# Patient Record
Sex: FEMALE | Race: WHITE | NOT HISPANIC OR LATINO | Employment: FULL TIME | URBAN - NONMETROPOLITAN AREA
[De-identification: names, ages, dates, MRNs, and addresses within clinical notes are randomized per-mention and may not be internally consistent; named-entity substitution may affect disease eponyms.]

---

## 2017-05-19 RX ORDER — LEVOTHYROXINE SODIUM 0.15 MG/1
150 TABLET ORAL DAILY
COMMUNITY
End: 2017-05-23 | Stop reason: SDUPTHER

## 2017-05-22 ENCOUNTER — OFFICE VISIT (OUTPATIENT)
Dept: FAMILY MEDICINE CLINIC | Facility: CLINIC | Age: 52
End: 2017-05-22

## 2017-05-22 VITALS
WEIGHT: 234.2 LBS | HEIGHT: 64 IN | TEMPERATURE: 98.3 F | DIASTOLIC BLOOD PRESSURE: 83 MMHG | SYSTOLIC BLOOD PRESSURE: 123 MMHG | RESPIRATION RATE: 16 BRPM | OXYGEN SATURATION: 95 % | BODY MASS INDEX: 39.98 KG/M2 | HEART RATE: 75 BPM

## 2017-05-22 DIAGNOSIS — K21.9 GASTROESOPHAGEAL REFLUX DISEASE WITHOUT ESOPHAGITIS: ICD-10-CM

## 2017-05-22 DIAGNOSIS — E03.8 OTHER SPECIFIED HYPOTHYROIDISM: Primary | ICD-10-CM

## 2017-05-22 PROBLEM — M77.12 LATERAL EPICONDYLITIS OF BOTH ELBOWS: Status: ACTIVE | Noted: 2017-05-22

## 2017-05-22 PROBLEM — G56.03 BILATERAL CARPAL TUNNEL SYNDROME: Status: ACTIVE | Noted: 2017-05-22

## 2017-05-22 PROBLEM — G47.419 PRIMARY NARCOLEPSY WITHOUT CATAPLEXY: Status: ACTIVE | Noted: 2017-05-22

## 2017-05-22 PROBLEM — M19.90 ARTHRITIS: Status: ACTIVE | Noted: 2017-05-22

## 2017-05-22 PROBLEM — M77.11 LATERAL EPICONDYLITIS OF BOTH ELBOWS: Status: ACTIVE | Noted: 2017-05-22

## 2017-05-22 PROCEDURE — 99204 OFFICE O/P NEW MOD 45 MIN: CPT | Performed by: NURSE PRACTITIONER

## 2017-05-22 RX ORDER — OMEPRAZOLE 20 MG/1
20 CAPSULE, DELAYED RELEASE ORAL DAILY
Qty: 90 CAPSULE | Refills: 1 | Status: SHIPPED | OUTPATIENT
Start: 2017-05-22 | End: 2017-11-22 | Stop reason: SDUPTHER

## 2017-05-23 DIAGNOSIS — E03.9 HYPOTHYROIDISM, UNSPECIFIED TYPE: Primary | ICD-10-CM

## 2017-05-23 LAB — TSH SERPL DL<=0.005 MIU/L-ACNC: 4.05 MIU/ML (ref 0.47–4.68)

## 2017-05-23 RX ORDER — LEVOTHYROXINE SODIUM 0.15 MG/1
150 TABLET ORAL DAILY
Qty: 90 TABLET | Refills: 1 | Status: SHIPPED | OUTPATIENT
Start: 2017-05-23 | End: 2017-11-27 | Stop reason: SDUPTHER

## 2017-11-22 ENCOUNTER — OFFICE VISIT (OUTPATIENT)
Dept: FAMILY MEDICINE CLINIC | Facility: CLINIC | Age: 52
End: 2017-11-22

## 2017-11-22 VITALS
BODY MASS INDEX: 40.67 KG/M2 | HEART RATE: 68 BPM | OXYGEN SATURATION: 97 % | HEIGHT: 64 IN | SYSTOLIC BLOOD PRESSURE: 126 MMHG | RESPIRATION RATE: 18 BRPM | WEIGHT: 238.2 LBS | TEMPERATURE: 97.8 F | DIASTOLIC BLOOD PRESSURE: 74 MMHG

## 2017-11-22 DIAGNOSIS — E55.9 VITAMIN D DEFICIENCY: ICD-10-CM

## 2017-11-22 DIAGNOSIS — Z12.4 SCREENING FOR CERVICAL CANCER: ICD-10-CM

## 2017-11-22 DIAGNOSIS — R53.83 FATIGUE, UNSPECIFIED TYPE: ICD-10-CM

## 2017-11-22 DIAGNOSIS — E03.9 HYPOTHYROIDISM, UNSPECIFIED TYPE: ICD-10-CM

## 2017-11-22 DIAGNOSIS — Z00.01 ENCOUNTER FOR WELL ADULT EXAM WITH ABNORMAL FINDINGS: ICD-10-CM

## 2017-11-22 DIAGNOSIS — Z12.11 SCREENING FOR MALIGNANT NEOPLASM OF COLON: ICD-10-CM

## 2017-11-22 DIAGNOSIS — Z12.39 SCREENING FOR MALIGNANT NEOPLASM OF BREAST: Primary | ICD-10-CM

## 2017-11-22 DIAGNOSIS — K21.9 GASTROESOPHAGEAL REFLUX DISEASE WITHOUT ESOPHAGITIS: ICD-10-CM

## 2017-11-22 DIAGNOSIS — Z13.220 SCREENING FOR LIPID DISORDERS: ICD-10-CM

## 2017-11-22 PROCEDURE — 99396 PREV VISIT EST AGE 40-64: CPT | Performed by: NURSE PRACTITIONER

## 2017-11-22 RX ORDER — OMEPRAZOLE 20 MG/1
20 CAPSULE, DELAYED RELEASE ORAL DAILY
Qty: 90 CAPSULE | Refills: 1 | Status: SHIPPED | OUTPATIENT
Start: 2017-11-22 | End: 2018-11-21 | Stop reason: SDUPTHER

## 2017-11-23 LAB
25(OH)D3+25(OH)D2 SERPL-MCNC: 23.1 NG/ML (ref 30–100)
ALBUMIN SERPL-MCNC: 4.3 G/DL (ref 3.5–5)
ALBUMIN/GLOB SERPL: 1.4 G/DL (ref 1.1–2.5)
ALP SERPL-CCNC: 82 U/L (ref 24–120)
ALT SERPL-CCNC: 40 U/L (ref 0–54)
AST SERPL-CCNC: 24 U/L (ref 7–45)
BASOPHILS # BLD AUTO: 0.03 10*3/MM3 (ref 0–0.2)
BASOPHILS NFR BLD AUTO: 0.3 % (ref 0–2)
BILIRUB SERPL-MCNC: 0.5 MG/DL (ref 0.1–1)
BUN SERPL-MCNC: 15 MG/DL (ref 5–21)
BUN/CREAT SERPL: 22.1 (ref 7–25)
CALCIUM SERPL-MCNC: 9.4 MG/DL (ref 8.4–10.4)
CHLORIDE SERPL-SCNC: 102 MMOL/L (ref 98–110)
CHOLEST SERPL-MCNC: 180 MG/DL (ref 130–200)
CO2 SERPL-SCNC: 31 MMOL/L (ref 24–31)
CREAT SERPL-MCNC: 0.68 MG/DL (ref 0.5–1.4)
EOSINOPHIL # BLD AUTO: 0.25 10*3/MM3 (ref 0–0.7)
EOSINOPHIL NFR BLD AUTO: 2.9 % (ref 0–4)
ERYTHROCYTE [DISTWIDTH] IN BLOOD BY AUTOMATED COUNT: 14.4 % (ref 12–15)
GFR SERPLBLD CREATININE-BSD FMLA CKD-EPI: 107 ML/MIN/1.73
GFR SERPLBLD CREATININE-BSD FMLA CKD-EPI: 88 ML/MIN/1.73
GLOBULIN SER CALC-MCNC: 3 GM/DL
GLUCOSE SERPL-MCNC: 112 MG/DL (ref 70–100)
HCT VFR BLD AUTO: 40.8 % (ref 37–47)
HDLC SERPL-MCNC: 79 MG/DL
HGB BLD-MCNC: 13 G/DL (ref 12–16)
IMM GRANULOCYTES # BLD: 0.04 10*3/MM3 (ref 0–0.03)
IMM GRANULOCYTES NFR BLD: 0.5 % (ref 0–5)
LDLC SERPL CALC-MCNC: 87 MG/DL (ref 0–99)
LYMPHOCYTES # BLD AUTO: 2.27 10*3/MM3 (ref 0.72–4.86)
LYMPHOCYTES NFR BLD AUTO: 26.2 % (ref 15–45)
MCH RBC QN AUTO: 30 PG (ref 28–32)
MCHC RBC AUTO-ENTMCNC: 31.9 G/DL (ref 33–36)
MCV RBC AUTO: 94 FL (ref 82–98)
MONOCYTES # BLD AUTO: 0.57 10*3/MM3 (ref 0.19–1.3)
MONOCYTES NFR BLD AUTO: 6.6 % (ref 4–12)
NEUTROPHILS # BLD AUTO: 5.52 10*3/MM3 (ref 1.87–8.4)
NEUTROPHILS NFR BLD AUTO: 63.5 % (ref 39–78)
PLATELET # BLD AUTO: 200 10*3/MM3 (ref 130–400)
POTASSIUM SERPL-SCNC: 4.3 MMOL/L (ref 3.5–5.3)
PROT SERPL-MCNC: 7.3 G/DL (ref 6.3–8.7)
RBC # BLD AUTO: 4.34 10*6/MM3 (ref 4.2–5.4)
SODIUM SERPL-SCNC: 142 MMOL/L (ref 135–145)
TRIGL SERPL-MCNC: 68 MG/DL (ref 0–149)
TSH SERPL DL<=0.005 MIU/L-ACNC: 2.9 MIU/ML (ref 0.47–4.68)
VLDLC SERPL CALC-MCNC: 13.6 MG/DL
WBC # BLD AUTO: 8.68 10*3/MM3 (ref 4.8–10.8)

## 2017-11-27 ENCOUNTER — HOSPITAL ENCOUNTER (OUTPATIENT)
Dept: MAMMOGRAPHY | Facility: HOSPITAL | Age: 52
Discharge: HOME | End: 2017-11-27
Admitting: NURSE PRACTITIONER

## 2017-11-27 DIAGNOSIS — Z12.39 SCREENING FOR MALIGNANT NEOPLASM OF BREAST: ICD-10-CM

## 2017-11-27 DIAGNOSIS — E55.9 VITAMIN D DEFICIENCY: Primary | ICD-10-CM

## 2017-11-27 DIAGNOSIS — E03.9 HYPOTHYROIDISM, UNSPECIFIED TYPE: ICD-10-CM

## 2017-11-27 PROCEDURE — G0202 SCR MAMMO BI INCL CAD: HCPCS

## 2017-11-27 PROCEDURE — 77063 BREAST TOMOSYNTHESIS BI: CPT

## 2017-11-27 RX ORDER — LEVOTHYROXINE SODIUM 0.15 MG/1
150 TABLET ORAL DAILY
Qty: 90 TABLET | Refills: 1 | Status: SHIPPED | OUTPATIENT
Start: 2017-11-27 | End: 2018-02-19

## 2017-12-20 ENCOUNTER — OFFICE VISIT (OUTPATIENT)
Dept: GASTROENTEROLOGY | Facility: CLINIC | Age: 52
End: 2017-12-20

## 2017-12-20 VITALS
OXYGEN SATURATION: 99 % | TEMPERATURE: 96.3 F | WEIGHT: 238 LBS | BODY MASS INDEX: 40.63 KG/M2 | HEIGHT: 64 IN | HEART RATE: 65 BPM | SYSTOLIC BLOOD PRESSURE: 128 MMHG | DIASTOLIC BLOOD PRESSURE: 82 MMHG

## 2017-12-20 DIAGNOSIS — K63.5 POLYP OF COLON, UNSPECIFIED PART OF COLON, UNSPECIFIED TYPE: Primary | ICD-10-CM

## 2017-12-20 DIAGNOSIS — Z83.71 FAMILY HISTORY OF POLYPS IN THE COLON: ICD-10-CM

## 2017-12-20 PROBLEM — Z83.719 FAMILY HISTORY OF POLYPS IN THE COLON: Status: ACTIVE | Noted: 2017-12-20

## 2017-12-20 PROCEDURE — S0285 CNSLT BEFORE SCREEN COLONOSC: HCPCS | Performed by: NURSE PRACTITIONER

## 2017-12-20 RX ORDER — SODIUM, POTASSIUM,MAG SULFATES 17.5-3.13G
2 SOLUTION, RECONSTITUTED, ORAL ORAL ONCE
Qty: 2 BOTTLE | Refills: 0 | Status: SHIPPED | OUTPATIENT
Start: 2017-12-20 | End: 2017-12-20

## 2017-12-20 NOTE — PROGRESS NOTES
"Chief Complaint   Patient presents with   • Colon Cancer Screening     Patient is here today as a new patient for colon screening.     Subjective   HPI  Jordy Mir is a 52 y.o. female who presents as a referral for preventative maintenance. She has no complaints of nausea or vomiting. No change in bowels. No wt loss. No BRBPR. No melena. There is no family hx for colon cancer. No abdominal pain. The patients last colonoscopy was performed in [place] with findings of a \"thumb nail size polyp\" per patient that was removed . She states that she was supposed to have a repeat colonoscopy in 2 years but she moved and did not get this done.  Past Medical History:   Diagnosis Date   • Disease of thyroid gland    • GERD (gastroesophageal reflux disease)    • Narcolepsy      Past Surgical History:   Procedure Laterality Date   •  SECTION     • CHOLECYSTECTOMY     • COLONOSCOPY     • HYSTERECTOMY     • OOPHORECTOMY         Current Outpatient Prescriptions:   •  levothyroxine (SYNTHROID, LEVOTHROID) 150 MCG tablet, Take 1 tablet by mouth Daily., Disp: 90 tablet, Rfl: 1  •  omeprazole (priLOSEC) 20 MG capsule, Take 1 capsule by mouth Daily., Disp: 90 capsule, Rfl: 1  •  sodium-potassium-magnesium sulfates (SUPREP BOWEL PREP KIT) 17.5-3.13-1.6 GM/180ML solution oral solution, Take 2 bottles by mouth 1 (One) Time for 1 dose. Split dose prep as directed by office instructions provided.  2 bottles = one kit., Disp: 2 bottle, Rfl: 0  Allergies   Allergen Reactions   • Contrast Dye    • Sulfa Antibiotics Rash     Social History     Social History   • Marital status:      Spouse name: N/A   • Number of children: N/A   • Years of education: N/A     Occupational History   • Not on file.     Social History Main Topics   • Smoking status: Former Smoker   • Smokeless tobacco: Never Used      Comment: Quit in    • Alcohol use No   • Drug use: No   • Sexual activity: Defer     Other Topics Concern   • Not on " "file     Social History Narrative     Family History   Problem Relation Age of Onset   • Arthritis Mother    • Colon polyps Mother    • Arthritis Father    • No Known Problems Sister    • Narcolepsy Daughter    • Depression Maternal Grandmother    • Depression Paternal Grandfather        REVIEW OF SYSTEMS  General: well appearing, no fever chills or sweats, no unexplained wt loss  HEENT: no acute visual or hearing disturbances  Cardiovascular: No chest pain or palpitations  Pulmonary: No shortness of breath, coughing, wheezing or hemoptysis  : No burning, urgency, hematuria, or dysuria  Musculoskeletal: No joint pain or stiffness  Peripheral: no edema  Skin: No lesions or rashes  Neuro: No dizziness, headaches, stroke, syncope  Endocrine: No hot or cold intolerances  Hematological: No blood dyscrasias    Objective   Vitals:    12/20/17 0923   BP: 128/82   Pulse: 65   Temp: 96.3 °F (35.7 °C)   SpO2: 99%   Weight: 108 kg (238 lb)   Height: 162.6 cm (64\")     Body mass index is 40.85 kg/(m^2).    PHYSICAL EXAM  General: age appropriate well nourished well appearing, no acute distress  Head: normocephalic and atraumatic  Global assessment-supple  Neck-No JVD noted, no lymphadenopathy  Pulmonary-clear to auscultation bilaterally, normal respiratory effort  Cardiovascular-normal rate and rhythm, normal heart sounds, S1 and S2 noted  Abdomen-soft, non tender, non distended, normal bowel sounds all 4 quadrants, no hepatosplenomegaly noted  Extremities-No clubbing cyanosis or edema  Neuro-Non focal, converses appropriately, awake, alert, oriented    Imaging Results (most recent)     None        Assessment/Plan   Jordy was seen today for colon cancer screening.    Diagnoses and all orders for this visit:    Polyp of colon, unspecified part of colon, unspecified type  -     Case Request; Standing  -     Case Request    Family history of polyps in the colon  Comments:  mother  Orders:  -     Case Request; Standing  -     Case " Request    Other orders  -     Implement Anesthesia Orders Day of Procedure; Standing  -     Obtain Informed Consent; Standing  -     Verify bowel prep was successful; Standing  -     sodium-potassium-magnesium sulfates (SUPREP BOWEL PREP KIT) 17.5-3.13-1.6 GM/180ML solution oral solution; Take 2 bottles by mouth 1 (One) Time for 1 dose. Split dose prep as directed by office instructions provided.  2 bottles = one kit.    COLONOSCOPY WITH ANESTHESIA (N/A)      All risks, benefits, alternatives, and indications of colonoscopy procedure have been discussed with the patient. Risks to include perforation of the colon requiring possible surgery or colostomy, risk of bleeding from biopsies or removal of colon tissue, possibility of missing a colon polyp or cancer, or adverse drug reaction.  Benefits to include the diagnosis and management of disease of the colon and rectum. Alternatives to include barium enema, radiographic evaluation, lab testing or no intervention. Pt verbalizes understanding and agrees.

## 2017-12-22 ENCOUNTER — OFFICE VISIT (OUTPATIENT)
Dept: OBSTETRICS AND GYNECOLOGY | Facility: CLINIC | Age: 52
End: 2017-12-22

## 2017-12-22 VITALS
SYSTOLIC BLOOD PRESSURE: 152 MMHG | WEIGHT: 235 LBS | DIASTOLIC BLOOD PRESSURE: 94 MMHG | HEIGHT: 64 IN | BODY MASS INDEX: 40.12 KG/M2

## 2017-12-22 DIAGNOSIS — N81.11 CYSTOCELE, MIDLINE: ICD-10-CM

## 2017-12-22 DIAGNOSIS — Z01.411 ENCOUNTER FOR GYNECOLOGICAL EXAMINATION WITH ABNORMAL FINDING: Primary | ICD-10-CM

## 2017-12-22 PROCEDURE — 99386 PREV VISIT NEW AGE 40-64: CPT | Performed by: OBSTETRICS & GYNECOLOGY

## 2017-12-22 NOTE — PROGRESS NOTES
"Subjective   Jordy Mir is a 52 y.o. female.     CC: annual exam    History of Present Illness  Jordy Mir is a 52 y.o. female here today for annual GYN examination. She is surgically menopausal and has had a hysterectomy for bleeding. She has no history of abnormal Pap smears. She denies any vaginal discharge or bleeding. She is not on hormone replacement therapy.  Her last mammogram was 11/27/17 and read as BIRADS 1. She has no specific complaints today and denies abdominal or pelvic pain.     Social History   Substance Use Topics   • Smoking status: Former Smoker   • Smokeless tobacco: Never Used      Comment: Quit in 2004   • Alcohol use No        Review of Systems   Constitutional: Negative for unexpected weight change.   Respiratory: Negative for shortness of breath.    Cardiovascular: Negative for chest pain.   Gastrointestinal: Negative for abdominal pain.   Genitourinary: Negative for vaginal bleeding.       Visit Vitals   • /94 (BP Location: Left arm, Patient Position: Sitting)   • Ht 162.6 cm (64\")   • Wt 107 kg (235 lb)   • BMI 40.34 kg/m2      Objective   Physical Exam   Constitutional: She is oriented to person, place, and time. She appears well-developed and well-nourished. No distress.   HENT:   Head: Normocephalic and atraumatic.   Eyes: EOM are normal.   Neck: Normal range of motion. Neck supple. No thyromegaly present.   Cardiovascular: Normal rate and regular rhythm.    No murmur heard.  Pulmonary/Chest: Effort normal and breath sounds normal. Right breast exhibits no inverted nipple and no mass. Left breast exhibits no inverted nipple and no mass.   Abdominal: Soft. She exhibits no distension. There is no tenderness.   Genitourinary: Vagina normal. No breast tenderness or discharge. Pelvic exam was performed with patient supine. There is no tenderness or lesion on the right labia. There is no tenderness or lesion on the left labia. Right adnexum displays no tenderness and no " fullness. Left adnexum displays no tenderness and no fullness. No bleeding in the vagina. No vaginal discharge found.   Genitourinary Comments: Pt s/p hysterectomy: uterus and cervix surgically absent.  Vaginal cuff unremarkable. There is a grade 2-3 cystocele noted.   Musculoskeletal: Normal range of motion. She exhibits no edema.   Neurological: She is alert and oriented to person, place, and time.   Skin: Skin is warm and dry.   Psychiatric: She has a normal mood and affect. Her behavior is normal. Judgment normal.   Nursing note and vitals reviewed.      Assessment/Plan   Jordy was seen today for gynecologic exam.    Diagnoses and all orders for this visit:    Encounter for gynecological examination with abnormal finding    Cystocele, midline       We have discussed current Pap smear and mammogram guidelines. We discussed her cystocele, but she is asymptomatic.  She will followup in one year or sooner if needed.

## 2018-02-05 ENCOUNTER — RESULTS ENCOUNTER (OUTPATIENT)
Dept: FAMILY MEDICINE CLINIC | Facility: CLINIC | Age: 53
End: 2018-02-05

## 2018-02-05 DIAGNOSIS — E55.9 VITAMIN D DEFICIENCY: ICD-10-CM

## 2018-02-17 LAB
25(OH)D3+25(OH)D2 SERPL-MCNC: <12.8 NG/ML (ref 30–100)
ALBUMIN SERPL-MCNC: 4.3 G/DL (ref 3.5–5)
ALBUMIN/GLOB SERPL: 1.3 G/DL (ref 1.1–2.5)
ALP SERPL-CCNC: 81 U/L (ref 24–120)
ALT SERPL-CCNC: 34 U/L (ref 0–54)
AST SERPL-CCNC: 21 U/L (ref 7–45)
BASOPHILS # BLD AUTO: 0.05 10*3/MM3 (ref 0–0.2)
BASOPHILS NFR BLD AUTO: 0.5 % (ref 0–2)
BILIRUB SERPL-MCNC: 0.5 MG/DL (ref 0.1–1)
BUN SERPL-MCNC: 14 MG/DL (ref 5–21)
BUN/CREAT SERPL: 20.6 (ref 7–25)
CALCIUM SERPL-MCNC: 9.7 MG/DL (ref 8.4–10.4)
CHLORIDE SERPL-SCNC: 99 MMOL/L (ref 98–110)
CHOLEST SERPL-MCNC: 180 MG/DL (ref 130–200)
CO2 SERPL-SCNC: 31 MMOL/L (ref 24–31)
CREAT SERPL-MCNC: 0.68 MG/DL (ref 0.5–1.4)
EOSINOPHIL # BLD AUTO: 0.31 10*3/MM3 (ref 0–0.7)
EOSINOPHIL NFR BLD AUTO: 3.4 % (ref 0–4)
ERYTHROCYTE [DISTWIDTH] IN BLOOD BY AUTOMATED COUNT: 14 % (ref 12–15)
GFR SERPLBLD CREATININE-BSD FMLA CKD-EPI: 111 ML/MIN/1.73
GFR SERPLBLD CREATININE-BSD FMLA CKD-EPI: 92 ML/MIN/1.73
GLOBULIN SER CALC-MCNC: 3.2 GM/DL
GLUCOSE SERPL-MCNC: 116 MG/DL (ref 70–100)
HCT VFR BLD AUTO: 42.2 % (ref 37–47)
HDLC SERPL-MCNC: 70 MG/DL
HGB BLD-MCNC: 13.2 G/DL (ref 12–16)
IMM GRANULOCYTES # BLD: 0.04 10*3/MM3 (ref 0–0.03)
IMM GRANULOCYTES NFR BLD: 0.4 % (ref 0–5)
LDLC SERPL CALC-MCNC: 92 MG/DL (ref 0–99)
LYMPHOCYTES # BLD AUTO: 2.17 10*3/MM3 (ref 0.72–4.86)
LYMPHOCYTES NFR BLD AUTO: 23.7 % (ref 15–45)
MCH RBC QN AUTO: 29.5 PG (ref 28–32)
MCHC RBC AUTO-ENTMCNC: 31.3 G/DL (ref 33–36)
MCV RBC AUTO: 94.2 FL (ref 82–98)
MONOCYTES # BLD AUTO: 0.58 10*3/MM3 (ref 0.19–1.3)
MONOCYTES NFR BLD AUTO: 6.3 % (ref 4–12)
NEUTROPHILS # BLD AUTO: 6 10*3/MM3 (ref 1.87–8.4)
NEUTROPHILS NFR BLD AUTO: 65.7 % (ref 39–78)
NRBC BLD AUTO-RTO: 0 /100 WBC (ref 0–0)
PLATELET # BLD AUTO: 209 10*3/MM3 (ref 130–400)
POTASSIUM SERPL-SCNC: 4.5 MMOL/L (ref 3.5–5.3)
PROT SERPL-MCNC: 7.5 G/DL (ref 6.3–8.7)
RBC # BLD AUTO: 4.48 10*6/MM3 (ref 4.2–5.4)
SODIUM SERPL-SCNC: 139 MMOL/L (ref 135–145)
TRIGL SERPL-MCNC: 89 MG/DL (ref 0–149)
TSH SERPL DL<=0.005 MIU/L-ACNC: 7 MIU/ML (ref 0.47–4.68)
VLDLC SERPL CALC-MCNC: 17.8 MG/DL
WBC # BLD AUTO: 9.15 10*3/MM3 (ref 4.8–10.8)

## 2018-02-19 DIAGNOSIS — E55.9 VITAMIN D DEFICIENCY: Primary | ICD-10-CM

## 2018-02-19 DIAGNOSIS — E03.9 ACQUIRED HYPOTHYROIDISM: ICD-10-CM

## 2018-02-19 DIAGNOSIS — E03.9 HYPOTHYROIDISM, UNSPECIFIED TYPE: ICD-10-CM

## 2018-02-19 RX ORDER — LEVOTHYROXINE SODIUM 175 UG/1
175 TABLET ORAL DAILY
Qty: 90 TABLET | Refills: 0 | Status: SHIPPED | OUTPATIENT
Start: 2018-02-19 | End: 2018-05-22 | Stop reason: SDUPTHER

## 2018-02-19 RX ORDER — ERGOCALCIFEROL 1.25 MG/1
50000 CAPSULE ORAL WEEKLY
Qty: 12 CAPSULE | Refills: 1 | Status: SHIPPED | OUTPATIENT
Start: 2018-02-19 | End: 2018-05-22 | Stop reason: SDUPTHER

## 2018-02-24 DIAGNOSIS — E55.9 VITAMIN D DEFICIENCY: Primary | ICD-10-CM

## 2018-02-24 LAB — 25(OH)D3+25(OH)D2 SERPL-MCNC: 18.7 NG/ML (ref 30–100)

## 2018-04-12 ENCOUNTER — ANESTHESIA (OUTPATIENT)
Dept: GASTROENTEROLOGY | Facility: HOSPITAL | Age: 53
End: 2018-04-12

## 2018-04-12 ENCOUNTER — HOSPITAL ENCOUNTER (OUTPATIENT)
Facility: HOSPITAL | Age: 53
Setting detail: HOSPITAL OUTPATIENT SURGERY
Discharge: HOME | End: 2018-04-12
Attending: INTERNAL MEDICINE | Admitting: INTERNAL MEDICINE

## 2018-04-12 ENCOUNTER — ANESTHESIA EVENT (OUTPATIENT)
Dept: GASTROENTEROLOGY | Facility: HOSPITAL | Age: 53
End: 2018-04-12

## 2018-04-12 VITALS
WEIGHT: 239 LBS | OXYGEN SATURATION: 100 % | HEART RATE: 58 BPM | HEIGHT: 64 IN | DIASTOLIC BLOOD PRESSURE: 70 MMHG | SYSTOLIC BLOOD PRESSURE: 134 MMHG | RESPIRATION RATE: 20 BRPM | BODY MASS INDEX: 40.8 KG/M2 | TEMPERATURE: 96.9 F

## 2018-04-12 DIAGNOSIS — K63.5 POLYP OF COLON, UNSPECIFIED PART OF COLON, UNSPECIFIED TYPE: ICD-10-CM

## 2018-04-12 DIAGNOSIS — Z83.71 FAMILY HISTORY OF POLYPS IN THE COLON: ICD-10-CM

## 2018-04-12 PROCEDURE — 45385 COLONOSCOPY W/LESION REMOVAL: CPT | Performed by: INTERNAL MEDICINE

## 2018-04-12 PROCEDURE — 88305 TISSUE EXAM BY PATHOLOGIST: CPT | Performed by: INTERNAL MEDICINE

## 2018-04-12 PROCEDURE — 25010000002 PROPOFOL 10 MG/ML EMULSION: Performed by: NURSE ANESTHETIST, CERTIFIED REGISTERED

## 2018-04-12 RX ORDER — SODIUM CHLORIDE 9 MG/ML
500 INJECTION, SOLUTION INTRAVENOUS CONTINUOUS PRN
Status: DISCONTINUED | OUTPATIENT
Start: 2018-04-12 | End: 2018-04-12 | Stop reason: HOSPADM

## 2018-04-12 RX ORDER — PROPOFOL 10 MG/ML
VIAL (ML) INTRAVENOUS AS NEEDED
Status: DISCONTINUED | OUTPATIENT
Start: 2018-04-12 | End: 2018-04-12 | Stop reason: SURG

## 2018-04-12 RX ORDER — SODIUM CHLORIDE 0.9 % (FLUSH) 0.9 %
3 SYRINGE (ML) INJECTION AS NEEDED
Status: DISCONTINUED | OUTPATIENT
Start: 2018-04-12 | End: 2018-04-12 | Stop reason: HOSPADM

## 2018-04-12 RX ORDER — LIDOCAINE HYDROCHLORIDE 20 MG/ML
INJECTION, SOLUTION INFILTRATION; PERINEURAL AS NEEDED
Status: DISCONTINUED | OUTPATIENT
Start: 2018-04-12 | End: 2018-04-12 | Stop reason: SURG

## 2018-04-12 RX ADMIN — PROPOFOL 260 MG: 10 INJECTION, EMULSION INTRAVENOUS at 12:18

## 2018-04-12 RX ADMIN — SODIUM CHLORIDE 500 ML: 9 INJECTION, SOLUTION INTRAVENOUS at 11:19

## 2018-04-12 RX ADMIN — LIDOCAINE HYDROCHLORIDE 0.5 ML: 10 INJECTION, SOLUTION EPIDURAL; INFILTRATION; INTRACAUDAL; PERINEURAL at 11:19

## 2018-04-12 RX ADMIN — LIDOCAINE HYDROCHLORIDE 60 MG: 20 INJECTION, SOLUTION INFILTRATION; PERINEURAL at 12:18

## 2018-04-12 NOTE — H&P
"    Chief Complaint:   Colon polyps    Subjective     HPI:   She had a colon polyp removed  and was constant.  She is advised to two-year follow-up that has yet to have it done.  She presents now for this to occur.    Past Medical History:   Past Medical History:   Diagnosis Date   • Disease of thyroid gland    • GERD (gastroesophageal reflux disease)    • Narcolepsy        Past Surgical History:  Past Surgical History:   Procedure Laterality Date   •  SECTION     • CHOLECYSTECTOMY     • COLONOSCOPY     • HYSTERECTOMY     • OOPHORECTOMY          Family History:  Family History   Problem Relation Age of Onset   • Arthritis Mother    • Colon polyps Mother    • Arthritis Father    • No Known Problems Sister    • Narcolepsy Daughter    • Depression Maternal Grandmother    • Depression Paternal Grandfather        Social History:   reports that she has quit smoking. She has never used smokeless tobacco. She reports that she does not drink alcohol or use drugs.    Medications:   Prescriptions Prior to Admission   Medication Sig Dispense Refill Last Dose   • levothyroxine (SYNTHROID, LEVOTHROID) 175 MCG tablet Take 1 tablet by mouth Daily. 90 tablet 0 2018 at Unknown time   • omeprazole (priLOSEC) 20 MG capsule Take 1 capsule by mouth Daily. 90 capsule 1 2018 at Unknown time   • ergocalciferol (ERGOCALCIFEROL) 16311 units capsule Take 1 capsule by mouth 1 (One) Time Per Week. 12 capsule 1 2018       Allergies:  Contrast dye and Sulfa antibiotics    ROS:    General: Weight stable  Resp: No SOA  Cardiovascular: No CP      Objective     /83 (BP Location: Right arm, Patient Position: Sitting)   Pulse 67   Temp 96.9 °F (36.1 °C) (Temporal Artery )   Resp 18   Ht 162.6 cm (64\")   Wt 108 kg (239 lb)   SpO2 96%   BMI 41.02 kg/m²     Physical Exam   Constitutional: Pt is oriented to person, place, and in no distress.  Eyes: No icterus  ENMT: Unremarkable   Cardiovascular: Normal rate, regular " rhythm.    Pulmonary/Chest: No distress.  No audible wheezes  Abdominal: Soft.   Skin: Skin is warm and dry.   Psychiatric: Mood, memory, affect and judgment appear normal.     Assessment/Plan     Diagnosis:  Colon polyps    Anticipated Surgical Procedure:  Colonoscopy    The risks, benefits, and alternatives of colonoscopy were reviewed with the patient today.  Risks including perforation of the colon possibly requiring surgery or colostomy.  Additional risks include risk of bleeding from biopsies or removal of colon tissue.  There is also the risk of a drug reaction or problems with anesthesia.  This will be discussed with the further by the anesthesia team on the day of the procedure.  Lastly there is a possibility of missing a colon polyp or cancer.  The benefits include the diagnosis and management of disease of the colon and rectum.  Alternatives to colonoscopy include barium enema, laboratory testing, radiographic evaluation, or no intervention.  The patient verbalizes understanding and agrees.    Much of this encounter note is an electronic transcription/translation of spoken language to printed text. The electronic translation of spoken language may permit erroneous, or at times, nonsensical words or phrases to be inadvertently transcribed; although I have reviewed the note for such errors, some may still exist.

## 2018-04-12 NOTE — ANESTHESIA POSTPROCEDURE EVALUATION
"Patient: Jordy Mir    Procedure Summary     Date:  04/12/18 Room / Location:   [facility] /  [facility]    Anesthesia Start:  1217 Anesthesia Stop:  1234    Procedure:  COLONOSCOPY WITH ANESTHESIA (N/A ) Diagnosis:       Family history of polyps in the colon      Polyp of colon, unspecified part of colon, unspecified type      (Family history of polyps in the colon [Z83.71])      (Polyp of colon, unspecified part of colon, unspecified type [K63.5])    Surgeon:  Mary Segura MD Provider:  Jennifer Shetty CRNA    Anesthesia Type:  general ASA Status:  3          Anesthesia Type: general  Last vitals  BP   134/70 (04/12/18 1255)   Temp   96.9 °F (36.1 °C) (04/12/18 1106)   Pulse   58 (04/12/18 1255)   Resp   20 (04/12/18 1255)     SpO2   100 % (04/12/18 1255)     Post Anesthesia Care and Evaluation    Patient location during evaluation: PHASE II  Patient participation: complete - patient participated  Level of consciousness: awake and alert  Pain management: adequate  Airway patency: patent  Anesthetic complications: No anesthetic complications    Cardiovascular status: acceptable  Respiratory status: acceptable  Hydration status: acceptable    Comments: Blood pressure 134/70, pulse 58, temperature 96.9 °F (36.1 °C), temperature source Temporal Artery , resp. rate 20, height 162.6 cm (64\"), weight 108 kg (239 lb), SpO2 100 %.    Pt discharged from PACU based on lamar score >8      "

## 2018-04-16 LAB
CYTO UR: NORMAL
LAB AP CASE REPORT: NORMAL
LAB AP CLINICAL INFORMATION: NORMAL
Lab: NORMAL
PATH REPORT.FINAL DX SPEC: NORMAL
PATH REPORT.GROSS SPEC: NORMAL

## 2018-05-17 ENCOUNTER — RESULTS ENCOUNTER (OUTPATIENT)
Dept: FAMILY MEDICINE CLINIC | Facility: CLINIC | Age: 53
End: 2018-05-17

## 2018-05-17 DIAGNOSIS — E03.9 ACQUIRED HYPOTHYROIDISM: ICD-10-CM

## 2018-05-17 DIAGNOSIS — E55.9 VITAMIN D DEFICIENCY: ICD-10-CM

## 2018-05-22 DIAGNOSIS — E03.9 HYPOTHYROIDISM, UNSPECIFIED TYPE: ICD-10-CM

## 2018-05-22 DIAGNOSIS — E55.9 VITAMIN D DEFICIENCY: ICD-10-CM

## 2018-05-22 LAB
25(OH)D3+25(OH)D2 SERPL-MCNC: 28.7 NG/ML (ref 30–100)
T4 FREE SERPL-MCNC: 1.68 NG/DL (ref 0.78–2.19)
THYROPEROXIDASE AB SERPL-ACNC: 15 IU/ML (ref 0–34)
TSH SERPL DL<=0.005 MIU/L-ACNC: 2.74 MIU/ML (ref 0.47–4.68)

## 2018-05-22 RX ORDER — LEVOTHYROXINE SODIUM 175 UG/1
175 TABLET ORAL DAILY
Qty: 90 TABLET | Refills: 0 | Status: SHIPPED | OUTPATIENT
Start: 2018-05-22 | End: 2018-08-23 | Stop reason: SDUPTHER

## 2018-05-22 RX ORDER — ERGOCALCIFEROL 1.25 MG/1
50000 CAPSULE ORAL WEEKLY
Qty: 12 CAPSULE | Refills: 1 | Status: SHIPPED | OUTPATIENT
Start: 2018-05-22 | End: 2018-11-21 | Stop reason: SDUPTHER

## 2018-06-22 ENCOUNTER — HOSPITAL ENCOUNTER (OUTPATIENT)
Dept: GENERAL RADIOLOGY | Facility: HOSPITAL | Age: 53
Discharge: HOME | End: 2018-06-22
Admitting: NURSE PRACTITIONER

## 2018-06-22 ENCOUNTER — OFFICE VISIT (OUTPATIENT)
Dept: FAMILY MEDICINE CLINIC | Facility: CLINIC | Age: 53
End: 2018-06-22

## 2018-06-22 VITALS
WEIGHT: 240.4 LBS | OXYGEN SATURATION: 95 % | DIASTOLIC BLOOD PRESSURE: 84 MMHG | BODY MASS INDEX: 41.04 KG/M2 | RESPIRATION RATE: 18 BRPM | SYSTOLIC BLOOD PRESSURE: 138 MMHG | HEART RATE: 62 BPM | HEIGHT: 64 IN | TEMPERATURE: 97.7 F

## 2018-06-22 DIAGNOSIS — G89.29 CHRONIC PAIN OF BOTH KNEES: Primary | ICD-10-CM

## 2018-06-22 DIAGNOSIS — M25.562 CHRONIC PAIN OF BOTH KNEES: Primary | ICD-10-CM

## 2018-06-22 DIAGNOSIS — K21.00 REFLUX ESOPHAGITIS: ICD-10-CM

## 2018-06-22 DIAGNOSIS — M25.561 CHRONIC PAIN OF BOTH KNEES: Primary | ICD-10-CM

## 2018-06-22 PROCEDURE — 73560 X-RAY EXAM OF KNEE 1 OR 2: CPT

## 2018-06-22 PROCEDURE — 99214 OFFICE O/P EST MOD 30 MIN: CPT | Performed by: NURSE PRACTITIONER

## 2018-06-22 RX ORDER — DICLOFENAC SODIUM 75 MG/1
75 TABLET, DELAYED RELEASE ORAL 2 TIMES DAILY
Qty: 60 TABLET | Refills: 2 | Status: SHIPPED | OUTPATIENT
Start: 2018-06-22 | End: 2018-06-28 | Stop reason: SINTOL

## 2018-06-22 NOTE — PATIENT INSTRUCTIONS
Knee Pain, Adult  Knee pain in adults is common. It can be caused by many things, including:  · Arthritis.  · A fluid-filled sac (cyst) or growth in your knee.  · An infection in your knee.  · An injury that will not heal.  · Damage, swelling, or irritation of the tissues that support your knee.    Knee pain is usually not a sign of a serious problem. The pain may go away on its own with time and rest. If it does not, a health care provider may order tests to find the cause of the pain. These may include:  · Imaging tests, such as an X-ray, MRI, or ultrasound.  · Joint aspiration. In this test, fluid is removed from the knee.  · Arthroscopy. In this test, a lighted tube is inserted into knee and an image is projected onto a TV screen.  · A biopsy. In this test, a sample of tissue is removed from the body and studied under a microscope.    Follow these instructions at home:  Pay attention to any changes in your symptoms. Take these actions to relieve your pain.  Activity  · Rest your knee.  · Do not do things that cause pain or make pain worse.  · Avoid high-impact activities or exercises, such as running, jumping rope, or doing jumping jacks.  General instructions  · Take over-the-counter and prescription medicines only as told by your health care provider.  · Raise (elevate) your knee above the level of your heart when you are sitting or lying down.  · Sleep with a pillow under your knee.  · If directed, apply ice to the knee:  ? Put ice in a plastic bag.  ? Place a towel between your skin and the bag.  ? Leave the ice on for 20 minutes, 2-3 times a day.  · Ask your health care provider if you should wear an elastic knee support.  · Lose weight if you are overweight. Extra weight can put pressure on your knee.  · Do not use any products that contain nicotine or tobacco, such as cigarettes and e-cigarettes. Smoking may slow the healing of any bone and joint problems that you may have. If you need help quitting, ask  your health care provider.  Contact a health care provider if:  · Your knee pain continues, changes, or gets worse.  · You have a fever along with knee pain.  · Your knee rachael or locks up.  · Your knee swells, and the swelling becomes worse.  Get help right away if:  · Your knee feels warm to the touch.  · You cannot move your knee.  · You have severe pain in your knee.  · You have chest pain.  · You have trouble breathing.  Summary  · Knee pain in adults is common. It can be caused by many things, including, arthritis, infection, cysts, or injury.  · Knee pain is usually not a sign of a serious problem, but if it does not go away, a health care provider may perform tests to know the cause of the pain.  · Pay attention to any changes in your symptoms. Relieve your pain with rest, medicines, light activity, and use of ice.  · Get help if your pain continues or becomes very severe, or if your knee rachael or locks up, or if you have chest pain or trouble breathing.  This information is not intended to replace advice given to you by your health care provider. Make sure you discuss any questions you have with your health care provider.  Document Released: 10/14/2008 Document Revised: 12/08/2017 Document Reviewed: 12/08/2017  [employer] Interactive Patient Education © 2018 [employer]

## 2018-06-22 NOTE — PROGRESS NOTES
Chief Complaint   Patient presents with   • Knee Pain     Pt states both knees are hurting       Allergies   Allergen Reactions   • Contrast Dye Anaphylaxis   • Sulfa Antibiotics Rash     HPI:  Jordy Mir is a 53 y.o. female presents today with     Past Medical History:   Diagnosis Date   • Disease of thyroid gland    • GERD (gastroesophageal reflux disease)    • Narcolepsy      Past Surgical History:   Procedure Laterality Date   •  SECTION     • CHOLECYSTECTOMY     • COLONOSCOPY     • COLONOSCOPY N/A 2018    Procedure: COLONOSCOPY WITH ANESTHESIA;  Surgeon: Mary Segura MD;  Location: [facility] ENDOSCOPY;  Service: Gastroenterology   • HYSTERECTOMY     • OOPHORECTOMY       Social History     Social History   • Marital status:      Spouse name: N/A   • Number of children: N/A   • Years of education: N/A     Occupational History   • Not on file.     Social History Main Topics   • Smoking status: Former Smoker   • Smokeless tobacco: Never Used      Comment: Quit in    • Alcohol use No   • Drug use: No   • Sexual activity: Defer     Other Topics Concern   • Not on file     Social History Narrative   • No narrative on file     Family History   Problem Relation Age of Onset   • Arthritis Mother    • Colon polyps Mother    • Arthritis Father    • No Known Problems Sister    • Narcolepsy Daughter    • Depression Maternal Grandmother    • Depression Paternal Grandfather        Current Outpatient Prescriptions on File Prior to Visit   Medication Sig Dispense Refill   • ergocalciferol (ERGOCALCIFEROL) 90377 units capsule Take 1 capsule by mouth 1 (One) Time Per Week. 12 capsule 1   • levothyroxine (SYNTHROID, LEVOTHROID) 175 MCG tablet Take 1 tablet by mouth Daily. 90 tablet 0   • omeprazole (priLOSEC) 20 MG capsule Take 1 capsule by mouth Daily. 90 capsule 1     No current facility-administered medications on file prior to visit.         REVIEW OF SYMPTOMS: (Positives bolded)  General:   "weight loss, fever, chills, night sweats, fatigue, appetite loss  Cardiovascular:  chest pain, PND, palpitation, edema, orthopnea, syncope, swelling of extremities  Musckelo: Arthralgia, myalgia, muscle weakness, joint swelling, NSAID use  Skin: rash, pruritis, sores, nail changes, skin thickening, change in wart/mole, itching, rash, new lesions, pruritus, nail changes  Neuro:  Migraine, numbness, ataxia, tremor, vertigo, weakness, memory loss, Irritability, \"All other systems reviewed and negative, except as listed above.”      OBJECTIVE:  Constitutional:  Alert, oriented x 3, well developed, well nourished. Consistent with stated age. Not in acute distress.  Has normal posture. Gait abnormal and limping on the __________________.  Behavior appropriate. Patient is pleasant and cooperative with the interview and exam.    Skin: No rashes, no visible scars or suspicious moles noted.  Skin is warm to touch. Normal appropriate skin turgor.  Capillary refill is normal bilateral Upper and lower extremity.     Head/Neck: Head is normocephalic and atraumatic.  Neck without visible/palpable lumps.  No thyromegaly.    Nose: External appearance normal/midline Bilateral nares normal, without purulent discharge     CHEST/LUNG: No use of accessory muscles, chest non-tender on palpation.  Breath sounds normal throughout all lung fields.  No wheezes, rales, rhonchi.    CARDIOVASCULAR:  Inspection: Carotid artery bilateral normal, no bruits, pulse regular/irregular.  Palpation/Percussion Bilateral normal pulsations.  Auscultation: Regular rate and rhythm. No murmur noted in sitting, supine, standing or squatting positions.    MUSCULOSKELETAL:   Lower extremity:   Knee Exam:    Inspection: Gait, limping. Alignment and contour of knees bilaterally intact.  Quadriceps musculature tight bilaterally and intact.  No atrophy.  Patellar hollows appear intact.  No obvious swelling around the knee. No e/o prepatellar bursitis.    Palpation:  " Patient sat on edge of examining table with knees in 90 degrees flexion.  Bony landmarks evaluated.  Patellar tendon not tender. Medial and lateral femoral condyle non tender.  Tibial plateau not tender along medial/lateral aspect.  Tibial tubercle not tender, no evidence of Osgood-Briones. Ligaments palpated and no tenderness along MCL/LCL.  Crepitus ***.  Patient moved to supine position. Patellar grind test with smooth motion. Knee flexed to 90 degrees and foot rested on the table.  Pes Anserine bursa palpated and non tender bilaterally.  No bakers cyst.  Bulge sign (knee extended place left hand above knee and apply pressure on suprapatellar pouch, displace/milking fluid downward, force fluid into lateral area tap the lateral aspect of the knee and look for fluid wave.)  Balloon sign: Present or Absent (Major effusion evaluation:Thumb and index finger of right hand on each side of patella.  Left hand compress suprapatellar  pouch aganst feuvr. Feel for fluid.)     Varus Stress Test:   Structure/sign being tested:  Integrity of the alteral collateral ligament (LCL), pt lying supine  Procedure: While holding the knee is slight flexion , an adduction force is applied to the knee while the distal tibia is moved medially  Result: Positive Increased laxity when compared bilaterally with the other knee     Valgus Stress Test:   Structure/sign being tested:  Integrity of the medial collateral ligament (MCL)  Procedure: While holding the knee in slight flexion, an abduction force is applied to the knee while the distal tibia is moved laterally  Result: Positive, Increased laxity when compared bilaterally with the other knee.     Posterior Drawer Test  Structure/sign being tested:  Integrity of the posterior collateral ligament (PCL)   Procedure: Palpating to make sure the hamstrings are relaxed, a posteriorly directed force is applied  Result:  Positive is increased laxity when compared bilaterally with the other  knee    Anterior Drawer Test  Structure/sign:  Integrity of the anterior collateral ligament (ACL)  Procedure:  Palpating to make sure the hamstrings are relaxed, an anteriorly directed force is applied  Result: Positive: increased laxity when compared bilaterally with the other knee.     Lachmans Test  Structure/sign being tested:  Integrity of the anterior collateral ligament ACL).  Procedure:  While the examiner supports the weight of the leg and the knee is flexed at 20 degrees, with the handstrings relaxed a firm anteriorly directed force is applied drawing the tibia anteriorly while a posterior force is applied to femur  Result:  Positive increased laxity when compared bilaterally with the other knee    McMurrays Test  Structure/sign being tested:  Integrity of the meniscus  Procedure:   Pass one- while the tibia is maintained in a neutral position, an axial load is applied to the knee passively flexing and extending through the available ROM  Pass two- A valgus force is applied while the knee is flexing and extending.  Then a varus force is applied while the knee is flexing and extending  Pass three- The examiner internally rotates the tibia, while the knee is teri flexed and extended, and a varus force is applied.  This procedure is repeated again with external rotation of the tibia and a valgus force  Result: Positive: a palpable click along the joint line, a reproductionm of pain from the menisci, or locking of the knee.     Patellar Grind Test  Structure/sign being tested:  Provocative test for patelllofemoral etiology of pain  Procedure:  The examiner applies a downward force to the patella in the femoral groove.  The patient is then asked to contract the quadriceps muscle slowly while pressure is maintained on patella  Result: Positive: the pt experience pain      Stability: Abduction stress test (valgus stress) normal.  Adduction stress test (varus stress) normal. Lachman negative.  Posterior drawer  sign negative.  Menisci:  (Patient supine grasp heel and flex knee. Cup hand over the knee joint with fingers and thumb along medial and lateral joint line.  Rotate lower leg internally and externally. Push on lateral side to apply a valgus stress on medial side while rotating.)   ROM: Knee ROM normal 0-120 degrees.    Assessment  {Assess/PlanSmartLinks:15355}      Rest, compress, ice and elevate  Crutches  Immobilzer    Differential: ddx chondromalacis patellae, Osgood-Schlatter disease, patellar tendinitis, stress fractures        No Follow-up on file.    Binta Mayo, DNP, APRN-BC  [unfilled]

## 2018-06-28 ENCOUNTER — TELEPHONE (OUTPATIENT)
Dept: FAMILY MEDICINE CLINIC | Facility: CLINIC | Age: 53
End: 2018-06-28

## 2018-06-29 ENCOUNTER — TELEPHONE (OUTPATIENT)
Dept: FAMILY MEDICINE CLINIC | Facility: CLINIC | Age: 53
End: 2018-06-29

## 2018-06-29 NOTE — TELEPHONE ENCOUNTER
Patient called and wanted to know what she should take instead of diclofinec.  I read note from Binta LOVE that directed patient to try OTC ibuprofen and she could have a reaction from that, too or any Nsaids.

## 2018-07-09 ENCOUNTER — OFFICE VISIT (OUTPATIENT)
Dept: FAMILY MEDICINE CLINIC | Facility: CLINIC | Age: 53
End: 2018-07-09

## 2018-07-09 VITALS
DIASTOLIC BLOOD PRESSURE: 90 MMHG | TEMPERATURE: 98 F | OXYGEN SATURATION: 97 % | BODY MASS INDEX: 41.67 KG/M2 | HEIGHT: 64 IN | SYSTOLIC BLOOD PRESSURE: 138 MMHG | HEART RATE: 65 BPM | RESPIRATION RATE: 18 BRPM | WEIGHT: 244.1 LBS

## 2018-07-09 DIAGNOSIS — E66.01 OBESITY, MORBID (HCC): ICD-10-CM

## 2018-07-09 DIAGNOSIS — E66.01 MORBID OBESITY WITH BMI OF 40.0-44.9, ADULT (HCC): ICD-10-CM

## 2018-07-09 DIAGNOSIS — M25.562 CHRONIC PAIN OF BOTH KNEES: Primary | ICD-10-CM

## 2018-07-09 DIAGNOSIS — G89.29 CHRONIC PAIN OF BOTH KNEES: Primary | ICD-10-CM

## 2018-07-09 DIAGNOSIS — M25.561 CHRONIC PAIN OF BOTH KNEES: Primary | ICD-10-CM

## 2018-07-09 PROCEDURE — 99214 OFFICE O/P EST MOD 30 MIN: CPT | Performed by: NURSE PRACTITIONER

## 2018-07-09 RX ORDER — DICLOFENAC SODIUM 75 MG/1
75 TABLET, DELAYED RELEASE ORAL 2 TIMES DAILY
COMMUNITY
End: 2018-11-21

## 2018-07-09 NOTE — PROGRESS NOTES
Chief Complaint   Patient presents with   • Knee Pain     Follow Up           HPI  Jordy Mir is a 53 y.o. female presents today for follow up for chronic bilateral knee pain that has been going on for several months.  Has appt with Dr. Bernardo at [facility] on Wednesday.  She has only been taking the diclofenac at night and using ibuprofen during the day.       Chronic problems: chronic knee pain somewhat stable with diclofenac, vit d def stable with ergocalciferol, hypothyroidism stable with levothyroxine and GERD stable with omeprazole.     PCP:  Binta Mayo, DNP, APRN    Allergies   Allergen Reactions   • Contrast Dye Anaphylaxis   • Diclofenac Dizziness   • Sulfa Antibiotics Rash       Past Medical History:   Diagnosis Date   • Disease of thyroid gland    • GERD (gastroesophageal reflux disease)    • Narcolepsy        Past Surgical History:   Procedure Laterality Date   •  SECTION     • CHOLECYSTECTOMY     • COLONOSCOPY     • COLONOSCOPY N/A 2018    Procedure: COLONOSCOPY WITH ANESTHESIA;  Surgeon: Mary Segura MD;  Location: [facility] ENDOSCOPY;  Service: Gastroenterology   • HYSTERECTOMY     • OOPHORECTOMY         Social History     Social History   • Marital status:      Social History Main Topics   • Smoking status: Former Smoker   • Smokeless tobacco: Never Used      Comment: Quit in    • Alcohol use No   • Drug use: No   • Sexual activity: Defer     Other Topics Concern   • Not on file       Family History   Problem Relation Age of Onset   • Arthritis Mother    • Colon polyps Mother    • Arthritis Father    • No Known Problems Sister    • Narcolepsy Daughter    • Depression Maternal Grandmother    • Depression Paternal Grandfather        Current Outpatient Prescriptions on File Prior to Visit   Medication Sig Dispense Refill   • ergocalciferol (ERGOCALCIFEROL) 80175 units capsule Take 1 capsule by mouth 1 (One) Time Per Week. 12 capsule 1   • levothyroxine (SYNTHROID,  "LEVOTHROID) 175 MCG tablet Take 1 tablet by mouth Daily. 90 tablet 0   • omeprazole (priLOSEC) 20 MG capsule Take 1 capsule by mouth Daily. 90 capsule 1     No current facility-administered medications on file prior to visit.         REVIEW OF SYMPTOMS: (Positives bolded)  General:  weight loss, fever, chills, night sweats, fatigue, appetite loss  HEENT:  blurry vision, eye pain, eye discharge, dry eyes, decreased vision, sore throat tinnitus, bloody nose, hearin gloss, sinus pain/pressure, ear pain/pressure.   Respiratory: shortness of breath, cough, hemoptysis, wheezing, pleurisy,   Cardiovascular:  chest pain, PND, palpitation, edema, orthopnea, syncope, swelling of extremities  Gastro: Nausea, vomiting, diarrhea, hematemesis, abdominal pain, constipation  Genito: hematuria, dysuria, glycosuria, hesitancy, frequency, incontinence  Musckelo: Arthralgia, myalgia, muscle weakness, joint swelling, NSAID use  Skin: rash, pruritis, sores, nail changes, skin thickening, change in wart/mole, itching, rash, new lesions, pruritus, nail changes  Neuro:  Migraine, numbness, ataxia, tremor, vertigo, weakness, memory loss  \"All other systems reviewed and negative, except as listed above.”      OBJECTIVE:  Constitutional:  Appearance-No acute distress, Consistent with stated age. Orientation- Oriented x 3, alert Posture-Not doubled over. Gait-Normal pace, normal arm movement. Posture- Normal Build and Nutrition-Well developed and well nourished.  General- Patient is pleasant and cooperative with the interview and exam.    Integumentary: General-No rashes, ulcers or lesions. No edema.  Palpation- Normal skin moisture/turgor. Skin is warm to touch, no increased warmth. Capillary refill is normal bilateral Upper and lower extremity.     Head/Neck: Head- normocephalic and atraumatic.  Neck- without visible/palpable lumps or pulsations.  Palpation- No bony tenderness about head/neck along frontal, occiptial, temporal, parietal, " mastoid, jawline, zygoma, orbit or any other location.  NO temporal artery tenderness. No TMJ tenderness. Normal cervical ROM.   Neck Supple.  Thyroid-No thyromegaly, no nodules    Eye: Bilaterally PERRLA, EOMI.  No discharge.  Upper and lower eyelids are normal. Sclera/conjunctiva normal without discharge. Cornea is normal and clear. Lens is normal.  Eyeball appears normal. No ciliary flushing, no conjunctival injection.    ENMT:  Pinna- normal without tenderness or erythema.  External auditory canal Left- normal without erythema or discharge, no excessive cerumen. External auditory canal Right-normal without erythema or discharge, no excessive cerumen. TM left- Grey/pearly, normal light reflex and anatomy TM Right- Grey/pearly, normal light reflex and anatomy Hearing Assessment-normal to conversational speech at 2-5 feet.  Nose and sinus-No sinus tenderness along frontal/maxillary region. External appearance normal and midline. Nares- bilateral quiet airflow, no discharge. Nasal mucosa- No bleeding noted and no ulcerations observed. Pink, moist. Turbinates non boggy. Lips- normal color, moist without cracks/lesions Oral Cavity/Palate- hard/soft palate intact without lesions, oral mucosa pink and moist. Dentition assessed and discussed appropriate oral care. Tongue normal midline.  Oropharynx- no pharyngeal erythema, Uvula midline. No post nasal drip. No exudate. Salivary glands- Non tender to palpation    CHEST/LUNG: Inspection- symmetric chest wall no pectus deformity. Normal effort, no distress, no use of accessory muscles. Palpation- nontender sternum, ribline.  No abnormal pulsations. Auscultation- Breath sounds normal throughout all lung fields.  Normal tracheal sounds, Normal bronchial sounds overlying sternum, Bronchovessicular sounds normal between scapulae posteriorly, Normal vessicular breath sounds heard throughout periphery. Lungs are clear today. Adventitious sounds- No wheezes, rales, rhonchi.      CARDIOVASCULAR:  Carotid artery- normal, no bruits or abnormal pulsations. Jugular vein- no pulsations. Palpation/Percussion- Normal PMI, no palpable thrill  Auscultation- Regular rate and rhythm. No murmur noted in sitting, supine positions. Extremities- no digital clubbing, cyanosis, edema, increased warmth.    ABDOMEN: Inspection- normal and no visible pulsations. Normal contour. Auscultation- Bowel sounds normal, no abdominal bruits. Palpation/Percussion- soft, non-tender, no rebound tenderness, no rigidity (guarding), no jar tenderness, no masses.  Liver-no hepatomegaly, Spleen - no splenomegaly, Hernias- none. Rectal not examined.     Peripheral Vascular: Upper extremity Left- Normal temperature with pink nailbeds and no ulcerations.  Upper extremity Right- Normal temperature with pink nailbeds and no ulcerations.  Lower extremity- Normal temperature with pink nailbeds and no ulcerations. DP pulses 2+ bilaterally.  Pedal hair intact.  Normal capillary refill. Edema- No edema.    Musculoskeletal: Generalized-No generalized swelling or edema of extremities, no digital clubbing or cyanosis, neurovascularly intact all four extremities.  Upper extremity- Symmetrical posture.  No visible deformity.  Normal sensation along medial and lateral upper extremity proximally and distally.  NO tenderness overlying shoulder, lateral/medial epicondyle.  5/5 and strength 5/5 bilateral UE.  Elbow palpated, no tenderness overlying olecranon.  Normal supination, pronation to active/passive ROM and to resisted rotation. Bicep insertion/tricep insertion appear normal without obvious pathology. Rotator cuff evaluated and intact.  Normal wrist ROM bilaterally. Normal hand movement, intrinsic muscles of hands normal. No tenderness to palpation of hands/wrists/elbows.      MUSCULOSKELETAL:   Lower extremity:   Knee Exam:    Inspection: Gait, limping. Alignment and contour of knees bilaterally intact.  Quadriceps musculature tight  bilaterally and intact.  No atrophy.  Patellar hollows appear intact. 2+ swelling bilateral knees.    Palpation: palpation of bilateral knees tender.  There is crepitus bilateral. Bony landmarks evaluated.  Patellar tenderness. Medial and lateral femoral condyle non tender.  Tibial plateau not tender along medial/lateral aspect.  Tibial tubercle not tender, no evidence of Osgood-Briones. Ligaments palpated and no tenderness along MCL/LCL.  Patient moved to supine position. Patellar grind test with smooth motion bilateral. Knee flexed to 90 degrees and foot rested on the table.  Pes Anserine bursa palpated and non tender bilaterally.  No bakers cyst.  Bulge sign:  Has fluid wave and bulge.   Balloon sign: absent      Varus Stress Test: normal     Valgus Stress Test:   normal     Posterior Drawer Test: negative     Anterior Drawer Test: negative     Lachmans Test:  negative     McMurrays Test: Painful     Patellar Grind Test:  Grinding bilaterally        Stability: ROM decreased, difficulty sitting  style       Neuropsych: Oriented- Person, place, time. (AAOx3), Mood/affect- normal and congruent. Able to articulate well. Speech-Normal speech, normal rate, normal tone, normal use of language, volume and coherence.  Thought content- normal with ability to perform basic computations and apply abstract thought/reason. Associations- intact, no SI/HI, no hallucinations, delusions, obsessions.  Judgment/insight- Appropriate. Memory-Recall intact, remote and recent memory intact. Knowledge- Age appropriate fund of knowledge, concentration and attention span normal.    Lymphatic: Head/Neck- normal size and non tender to palpation. Axillary- Head and neck LN are normal size and non tender to palpation. Femoral and Inguinal- normal size and non tender to palpation.      Assessment/Plan:  Jordy was seen today for knee pain.    Diagnoses and all orders for this visit:    Chronic pain of both knees    Morbid obesity with BMI  of 40.0-44.9, adult (CMS/[facility])    Obesity, morbid (CMS/[facility])    BMI:   41.9    Weight:    244    Follow up plan:  Lose at least 3 pounds before next chronic visit, exercise at least 3 times a week for 30 minute increments, eat baked instead of fried foods, and eat healthier     -  Documentation of education   The patient BMI is outside this range and we recommended/discussed today to utilize a diet/exercise program to get back into the appropriate range.  Federal guidelines recommend that people under the age of 65 should have a BMI of 18.5-24.9  Food diary:  Bring to next visit  tial step is to document everything that is consumed. Pt's that have a food diary  Lose 2x the weight  by keeping track of foods.   Choose one bad food weekly and eliminate it from your diet.  Replace with one healthy food  Exercise diary: Goal over next 2-4 weeks is walk 30 minutes per day 5 days per week at pace difficult to hold conversation.   Drink more water, less soda.   Cut back on portion sizes.   Today we encouraged roughly a 1 lb per week weight loss with initial goal of 5% weight loss.  Avoid fast foods, eat more salads  If eating out for a meal, consider cutting food in half and placing into a to go container.  Individually portion any foods coming into the home   Use smaller plates  Drink 12 ozof water 30 minutes before meal as way to suppress appetite.  Discussed Contrave, metformin, topamax, Belviq and the cost of medications  Encouraged internet programs or self help books  Set  Goals that are realistic   Educated on ways to measure food  Baseball: 1 cup good for green salad, frozen yogurt, medium piece of fruit  Handful:  ½ cup good cut fruit, cooked vegetables, pasta, rice  Egg:  ¼ cup good for dried fruit  Deck of cards: 3 ounces good for meat and poultry  Check book:  3 ounces grilled fish  Plate Method:  reduce plate size to 9 inch dinner plate.  Half of plate should be filled with non-starchy vegetables (broccoli,  lettuce, cauliflower, tomatoes), ¼ plate with lean source of protein (lean chicken, turkey, fish), remaining ½ with whole grains (brown rice, potato, whole grain breads  Avoid liquid calories (regular soda, juice, coffee with cream)  Focus  on water, seltzer water and other non-calorie drinks  Replace regular sugar with non-caloric sweeteners  Avoid skipping meals: plan small regular meals throughout the day in order to keep your hunger controlled  Consider using meal replacements if unable to plan a healthy meal (protein shake, high protein bar)  Replace all white bread with whole wheat/whole grain alternative  Swap regular salad dressings (mayonnaise, butter, or low fat or fat free alternative)  Avoid high fat, high calorie, high carbohydrate snakes (cookies, pastries, cakes)  Snack on fruits, low fat dairy (yogurt, cottage cheese)            Management plan:  Take medications as prescribed; return to the clinic of new or worsening concerns.       Risks/benefits of current and new medications discussed with the patient and or family today.  The patient/family are aware and accept that if there any side effects they should call or return to clinic as soon as possible.  Appropriate F/U discussed for topics addressed today. All questions were answered to the satisfactory state of patient/family.  Should symptoms fail to improve or worsen they agree to call or return to clinic or to go to the ER. Education handouts were offered on any new Rx if requested.  Discussed the importance of following up with any needed screening tests/labs/specialist appointments and any requested follow-up recommended by me today.  Importance of maintaining follow-up discussed and patient accepts that missed appointments can delay diagnosis and potentially lead to worsening of conditions.    No Follow-up on file.    Binta Mayo, DNP, APRN  7/9/2018

## 2018-07-09 NOTE — PATIENT INSTRUCTIONS
Obesity, Adult  Obesity is the condition of having too much total body fat. Being overweight or obese means that your weight is greater than what is considered healthy for your body size. Obesity is determined by a measurement called BMI. BMI is an estimate of body fat and is calculated from height and weight. For adults, a BMI of 30 or higher is considered obese.  Obesity can eventually lead to other health concerns and major illnesses, including:  · Stroke.  · Coronary artery disease (CAD).  · Type 2 diabetes.  · Some types of cancer, including cancers of the colon, breast, uterus, and gallbladder.  · Osteoarthritis.  · High blood pressure (hypertension).  · High cholesterol.  · Sleep apnea.  · Gallbladder stones.  · Infertility problems.    What are the causes?  The main cause of obesity is taking in (consuming) more calories than your body uses for energy. Other factors that contribute to this condition may include:  · Being born with genes that make you more likely to become obese.  · Having a medical condition that causes obesity. These conditions include:  ? Hypothyroidism.  ? Polycystic ovarian syndrome (PCOS).  ? Binge-eating disorder.  ? Cushing syndrome.  · Taking certain medicines, such as steroids, antidepressants, and seizure medicines.  · Not being physically active (sedentary lifestyle).  · Living where there are limited places to exercise safely or buy healthy foods.  · Not getting enough sleep.    What increases the risk?  The following factors may increase your risk of this condition:  · Having a family history of obesity.  · Being a woman of -American descent.  · Being a man of  descent.    What are the signs or symptoms?  Having excessive body fat is the main symptom of this condition.  How is this diagnosed?  This condition may be diagnosed based on:  · Your symptoms.  · Your medical history.  · A physical exam. Your health care provider may measure:  ? Your BMI. If you are  an adult with a BMI between 25 and less than 30, you are considered overweight. If you are an adult with a BMI of 30 or higher, you are considered obese.  ? The distances around your hips and your waist (circumferences). These may be compared to each other to help diagnose your condition.  ? Your skinfold thickness. Your health care provider may gently pinch a fold of your skin and measure it.    How is this treated?  Treatment for this condition often includes changing your lifestyle. Treatment may include some or all of the following:  · Dietary changes. Work with your health care provider and a dietitian to set a weight-loss goal that is healthy and reasonable for you. Dietary changes may include eating:  ? Smaller portions. A portion size is the amount of a particular food that is healthy for you to eat at one time. This varies from person to person.  ? Low-calorie or low-fat options.  ? More whole grains, fruits, and vegetables.  · Regular physical activity. This may include aerobic activity (cardio) and strength training.  · Medicine to help you lose weight. Your health care provider may prescribe medicine if you are unable to lose 1 pound a week after 6 weeks of eating more healthily and doing more physical activity.  · Surgery. Surgical options may include gastric banding and gastric bypass. Surgery may be done if:  ? Other treatments have not helped to improve your condition.  ? You have a BMI of 40 or higher.  ? You have life-threatening health problems related to obesity.    Follow these instructions at home:    Eating and drinking    · Follow recommendations from your health care provider about what you eat and drink. Your health care provider may advise you to:  ? Limit fast foods, sweets, and processed snack foods.  ? Choose low-fat options, such as low-fat milk instead of whole milk.  ? Eat 5 or more servings of fruits or vegetables every day.  ? Eat at home more often. This gives you more control  over what you eat.  ? Choose healthy foods when you eat out.  ? Learn what a healthy portion size is.  ? Keep low-fat snacks on hand.  ? Avoid sugary drinks, such as soda, fruit juice, iced tea sweetened with sugar, and flavored milk.  ? Eat a healthy breakfast.  · Drink enough water to keep your urine clear or pale yellow.  · Do not go without eating for long periods of time (do not fast) or follow a fad diet. Fasting and fad diets can be unhealthy and even dangerous.  Physical Activity  · Exercise regularly, as told by your health care provider. Ask your health care provider what types of exercise are safe for you and how often you should exercise.  · Warm up and stretch before being active.  · Cool down and stretch after being active.  · Rest between periods of activity.  Lifestyle  · Limit the time that you spend in front of your TV, computer, or video game system.  · Find ways to reward yourself that do not involve food.  · Limit alcohol intake to no more than 1 drink a day for nonpregnant women and 2 drinks a day for men. One drink equals 12 oz of beer, 5 oz of wine, or 1½ oz of hard liquor.  General instructions  · Keep a weight loss journal to keep track of the food you eat and how much you exercise you get.  · Take over-the-counter and prescription medicines only as told by your health care provider.  · Take vitamins and supplements only as told by your health care provider.  · Consider joining a support group. Your health care provider may be able to recommend a support group.  · Keep all follow-up visits as told by your health care provider. This is important.  Contact a health care provider if:  · You are unable to meet your weight loss goal after 6 weeks of dietary and lifestyle changes.  This information is not intended to replace advice given to you by your health care provider. Make sure you discuss any questions you have with your health care provider.  Document Released: 01/25/2006 Document  Revised: 05/22/2017 Document Reviewed: 10/05/2016  [employer] Interactive Patient Education © 2018 [employer]      MyPlate from [employer]  The general, healthful diet is based on the 2010 Dietary Guidelines for Americans. The amount of food you need to eat from each food group depends on your age, sex, and level of physical activity and can be individualized by a dietitian. Go to ChooseMyPlate.gov for more information.  What do I need to know about the MyPlate plan?  · Enjoy your food, but eat less.  · Avoid oversized portions.  ? ½ of your plate should include fruits and vegetables.  ? ¼ of your plate should be grains.  ? ¼ of your plate should be protein.  Grains  · Make at least half of your grains whole grains.  · For a 2,000 calorie daily food plan, eat 6 oz every day.  · 1 oz is about 1 slice bread, 1 cup cereal, or ½ cup cooked rice, cereal, or pasta.  Vegetables  · Make half your plate fruits and vegetables.  · For a 2,000 calorie daily food plan, eat 2½ cups every day.  · 1 cup is about 1 cup raw or cooked vegetables or vegetable juice or 2 cups raw leafy greens.  Fruits  · Make half your plate fruits and vegetables.  · For a 2,000 calorie daily food plan, eat 2 cups every day.  · 1 cup is about 1 cup fruit or 100% fruit juice or ½ cup dried fruit.  Protein  · For a 2,000 calorie daily food plan, eat 5½ oz every day.  · 1 oz is about 1 oz meat, poultry, or fish, ¼ cup cooked beans, 1 egg, 1 Tbsp peanut butter, or ½ oz nuts or seeds.  Dairy  · Switch to fat-free or low-fat (1%) milk.  · For a 2,000 calorie daily food plan, eat 3 cups every day.  · 1 cup is about 1 cup milk or yogurt or soy milk (soy beverage), 1½ oz natural cheese, or 2 oz processed cheese.  Fats, Oils, and Empty Calories  · Only small amounts of oils are recommended.  · Empty calories are calories from solid fats or added sugars.  · Compare sodium in foods like soup, bread, and frozen meals. Choose the foods with lower numbers.  · Drink water  instead of sugary drinks.  What foods can I eat?  Grains  Whole grains such as whole wheat, quinoa, millet, and bulgur. Bread, rolls, and pasta made from whole grains. Brown or wild rice. Hot or cold cereals made from whole grains and without added sugar.  Vegetables  All fresh vegetables, especially fresh red, dark green, or orange vegetables. Peas and beans. Low-sodium frozen or canned vegetables prepared without added salt. Low-sodium vegetable juices.  Fruits  All fresh, frozen, and dried fruits. Canned fruit packed in water or fruit juice without added sugar. Fruit juices without added sugar.  Meats and Other Protein Sources  Boiled, baked, or grilled lean meat trimmed of fat. Skinless poultry. Fresh seafood and shellfish. Canned seafood packed in water. Unsalted nuts and unsalted nut butters. Tofu. Dried beans and pea. Eggs.  Dairy  Low-fat or fat-free milk, yogurt, and cheeses.  Sweets and Desserts  Frozen desserts made from low-fat milk.  Fats and Oils  Olive, peanut, and canola oils and margarine. Salad dressing and mayonnaise made from these oils.  Other  Soups and casseroles made from allowed ingredients and without added fat or salt.  The items listed above may not be a complete list of recommended foods or beverages. Contact your dietitian for more options.  What foods are not recommended?  Grains  Sweetened, low-fiber cereals. Packaged baked goods. Snack crackers and chips. Cheese crackers, butter crackers, and biscuits. Frozen waffles, sweet breads, doughnuts, pastries, packaged baking mixes, pancakes, cakes, and cookies.  Vegetables  Regular canned or frozen vegetables or vegetables prepared with salt. Canned tomatoes. Canned tomato sauce. Fried vegetables. Vegetables in cream sauce or cheese sauce.  Fruits  Fruits packed in syrup or made with added sugar.  Meats and Other Protein Sources  Marbled or fatty meats such as ribs. Poultry with skin. Fried meats, poultry, eggs, or fish. Sausages, hot dogs,  and deli meats such as pastrami, bologna, or salami.  Dairy  Whole milk, cream, cheeses made from whole milk, sour cream. Ice cream or yogurt made from whole milk or with added sugar.  Beverages  For adults, no more than one alcoholic drink per day. Regular soft drinks or other sugary beverages. Juice drinks.  Sweets and Desserts  Sugary or fatty desserts, candy, and other sweets.  Fats and Oils  Solid shortening or partially hydrogenated oils. Solid margarine. Margarine that contains trans fats. Butter.  The items listed above may not be a complete list of foods and beverages to avoid. Contact your dietitian for more information.  This information is not intended to replace advice given to you by your health care provider. Make sure you discuss any questions you have with your health care provider.  Document Released: 01/06/2009 Document Revised: 05/25/2017 Document Reviewed: 11/26/2014  [employer] Interactive Patient Education © 2018 [employer]

## 2018-08-23 DIAGNOSIS — E03.9 HYPOTHYROIDISM, UNSPECIFIED TYPE: ICD-10-CM

## 2018-08-24 RX ORDER — LEVOTHYROXINE SODIUM 175 UG/1
TABLET ORAL
Qty: 90 TABLET | Refills: 0 | Status: SHIPPED | OUTPATIENT
Start: 2018-08-24 | End: 2018-11-21 | Stop reason: SDUPTHER

## 2018-08-29 ENCOUNTER — TELEPHONE (OUTPATIENT)
Dept: FAMILY MEDICINE CLINIC | Facility: CLINIC | Age: 53
End: 2018-08-29

## 2018-11-21 ENCOUNTER — OFFICE VISIT (OUTPATIENT)
Dept: FAMILY MEDICINE CLINIC | Facility: CLINIC | Age: 53
End: 2018-11-21

## 2018-11-21 DIAGNOSIS — M25.562 CHRONIC PAIN OF BOTH KNEES: Primary | ICD-10-CM

## 2018-11-21 DIAGNOSIS — K21.9 GASTROESOPHAGEAL REFLUX DISEASE WITHOUT ESOPHAGITIS: ICD-10-CM

## 2018-11-21 DIAGNOSIS — J45.40 MODERATE PERSISTENT ASTHMA, UNSPECIFIED WHETHER COMPLICATED: ICD-10-CM

## 2018-11-21 DIAGNOSIS — G89.29 CHRONIC PAIN OF BOTH KNEES: Primary | ICD-10-CM

## 2018-11-21 DIAGNOSIS — E03.9 HYPOTHYROIDISM, UNSPECIFIED TYPE: ICD-10-CM

## 2018-11-21 DIAGNOSIS — E55.9 VITAMIN D DEFICIENCY: ICD-10-CM

## 2018-11-21 DIAGNOSIS — M25.561 CHRONIC PAIN OF BOTH KNEES: Primary | ICD-10-CM

## 2018-11-21 DIAGNOSIS — R89.9 ABNORMAL LABORATORY TEST: ICD-10-CM

## 2018-11-21 PROCEDURE — 99214 OFFICE O/P EST MOD 30 MIN: CPT | Performed by: NURSE PRACTITIONER

## 2018-11-21 RX ORDER — LEVOTHYROXINE SODIUM 175 UG/1
175 TABLET ORAL DAILY
Qty: 90 TABLET | Refills: 1 | Status: SHIPPED | OUTPATIENT
Start: 2018-11-21 | End: 2019-02-27 | Stop reason: SDUPTHER

## 2018-11-21 RX ORDER — ALBUTEROL SULFATE 90 UG/1
2 AEROSOL, METERED RESPIRATORY (INHALATION) EVERY 4 HOURS PRN
COMMUNITY
End: 2018-11-21 | Stop reason: SDUPTHER

## 2018-11-21 RX ORDER — NAPROXEN SODIUM 220 MG
220 TABLET ORAL 2 TIMES DAILY PRN
COMMUNITY
End: 2018-11-21

## 2018-11-21 RX ORDER — OMEPRAZOLE 20 MG/1
20 CAPSULE, DELAYED RELEASE ORAL DAILY
Qty: 90 CAPSULE | Refills: 1 | Status: SHIPPED | OUTPATIENT
Start: 2018-11-21

## 2018-11-21 RX ORDER — ALBUTEROL SULFATE 90 UG/1
2 AEROSOL, METERED RESPIRATORY (INHALATION) EVERY 4 HOURS PRN
Qty: 1 INHALER | Refills: 5 | Status: SHIPPED | OUTPATIENT
Start: 2018-11-21

## 2018-11-21 RX ORDER — ERGOCALCIFEROL 1.25 MG/1
50000 CAPSULE ORAL WEEKLY
Qty: 12 CAPSULE | Refills: 1 | Status: SHIPPED | OUTPATIENT
Start: 2018-11-21 | End: 2019-08-12

## 2018-11-21 NOTE — PATIENT INSTRUCTIONS
Knee Pain, Adult  Many things can cause knee pain. The pain often goes away on its own with time and rest. If the pain does not go away, tests may be done to find out what is causing the pain.  Follow these instructions at home:  Activity  · Rest your knee.  · Do not do things that cause pain.  · Avoid activities where both feet leave the ground at the same time (high-impact activities). Examples are running, jumping rope, and doing jumping jacks.  General instructions  · Take medicines only as told by your doctor.  · Raise (elevate) your knee when you are resting. Make sure your knee is higher than your heart.  · Sleep with a pillow under your knee.  · If told, put ice on the knee:  ? Put ice in a plastic bag.  ? Place a towel between your skin and the bag.  ? Leave the ice on for 20 minutes, 2-3 times a day.  · Ask your doctor if you should wear an elastic knee support.  · Lose weight if you are overweight. Being overweight can make your knee hurt more.  · Do not use any tobacco products. These include cigarettes, chewing tobacco, or electronic cigarettes. If you need help quitting, ask your doctor. Smoking may slow down healing.  Contact a doctor if:  · The pain does not stop.  · The pain changes or gets worse.  · You have a fever along with knee pain.  · Your knee gives out or locks up.  · Your knee swells, and becomes worse.  Get help right away if:  · Your knee feels warm.  · You cannot move your knee.  · You have very bad knee pain.  · You have chest pain.  · You have trouble breathing.  Summary  · Many things can cause knee pain. The pain often goes away on its own with time and rest.  · Avoid activities that put stress on your knee. These include running and jumping rope.  · Get help right away if you cannot move your knee, or if your knee feels warm, or if you have trouble breathing.  This information is not intended to replace advice given to you by your health care provider. Make sure you discuss any  questions you have with your health care provider.  Document Released: 03/16/2010 Document Revised: 12/12/2017 Document Reviewed: 12/12/2017  [employer] Interactive Patient Education © 2017 [employer]

## 2018-11-21 NOTE — PROGRESS NOTES
Chief Complaint   Patient presents with   • Hypothyroidism     Pt is here for follow up and medication refills.    Pt has declined flu shot.            HPI  Jordy Mir is a 53 y.o. female presents today for complaints of continued bilateral knee pain.  She has been seeing ortho and got cortisone injections.     Chronic problems: chronic knee pain somewhat stable with diclofenac, vit d def stable with ergocalciferol, hypothyroidism stable with levothyroxine and GERD stable with omeprazole.     PCP:  Binta Mayo, DNP, APRN    Allergies   Allergen Reactions   • Contrast Dye Anaphylaxis   • Diclofenac Dizziness   • Sulfa Antibiotics Rash       Past Medical History:   Diagnosis Date   • Disease of thyroid gland    • GERD (gastroesophageal reflux disease)    • Narcolepsy        Past Surgical History:   Procedure Laterality Date   •  SECTION     • CHOLECYSTECTOMY     • COLONOSCOPY     • HYSTERECTOMY     • OOPHORECTOMY         Social History     Socioeconomic History   • Marital status:      Spouse name: Not on file   • Number of children: Not on file   • Years of education: Not on file   • Highest education level: Not on file   Tobacco Use   • Smoking status: Former Smoker   • Smokeless tobacco: Never Used   • Tobacco comment: Quit in    Substance and Sexual Activity   • Alcohol use: No   • Drug use: No   • Sexual activity: Defer       Family History   Problem Relation Age of Onset   • Arthritis Mother    • Colon polyps Mother    • Arthritis Father    • No Known Problems Sister    • Narcolepsy Daughter    • Depression Maternal Grandmother    • Depression Paternal Grandfather        Current Outpatient Medications on File Prior to Visit   Medication Sig Dispense Refill   • albuterol (PROVENTIL HFA;VENTOLIN HFA) 108 (90 Base) MCG/ACT inhaler Inhale 2 puffs Every 4 (Four) Hours As Needed for Wheezing.     • ergocalciferol (ERGOCALCIFEROL) 93676 units capsule Take 1 capsule by mouth 1 (One)  "Time Per Week. 12 capsule 1   • levothyroxine (SYNTHROID, LEVOTHROID) 175 MCG tablet TAKE 1 TABLET BY MOUTH ONCE DAILY 90 tablet 0   • naproxen sodium (ALEVE) 220 MG tablet Take 220 mg by mouth 2 (Two) Times a Day As Needed.     • omeprazole (priLOSEC) 20 MG capsule Take 1 capsule by mouth Daily. 90 capsule 1   • polyethyl glycol-propyl glycol (SYSTANE) 0.4-0.3 % solution ophthalmic solution Every 1 (One) Hour As Needed.     • diclofenac (VOLTAREN) 75 MG EC tablet Take 75 mg by mouth 2 (Two) Times a Day.       No current facility-administered medications on file prior to visit.         REVIEW OF SYMPTOMS: (Positives bolded)  General:  weight loss, fever, chills, night sweats, fatigue, appetite loss  HEENT:  blurry vision, eye pain, eye discharge, dry eyes, decreased vision  Respiratory: shortness of breath, cough, hemoptysis, wheezing, pleurisy,   Cardiovascular:  chest pain, PND, palpitation, edema, orthopnea, syncope, swelling of extremities  Gastro: Nausea, vomiting, diarrhea, hematemesis, abdominal pain, constipation  Genito: hematuria, dysuria, glycosuria, hesitancy, frequency, incontinence  Musckelo: Arthralgia, myalgia, muscle weakness, joint swelling, NSAID use  Skin: rash, pruritis, sores, nail changes, skin thickening, change in wart/mole, itching, rash, new lesions, pruritus, nail changes  Neuro:  Migraine, numbness, ataxia, tremor, vertigo, weakness, memory loss  \"All other systems reviewed and negative, except as listed above.”      OBJECTIVE:  Constitutional:  Appearance-No acute distress, Consistent with stated age. Orientation- Oriented x 3, alert Posture-Not doubled over. Gait-Normal pace, normal arm movement. Posture- Normal Build and Nutrition-Well developed and well nourished.  General- Patient is pleasant and cooperative with the interview and exam.    Integumentary: General-No rashes, ulcers or lesions. No edema.  Palpation- Normal skin moisture/turgor. Skin is warm to touch, no increased " warmth. Capillary refill is normal bilateral Upper and lower extremity.     Eye: Bilaterally PERRLA, EOMI.  No discharge.  Upper and lower eyelids are normal. Sclera/conjunctiva normal without discharge. Cornea is normal and clear. Lens is normal.  Eyeball appears normal. No ciliary flushing, no conjunctival injection.    ENMT:  Pinna- normal without tenderness or erythema.  External auditory canal Left- normal without erythema or discharge, no excessive cerumen. External auditory canal Right-normal without erythema or discharge, no excessive cerumen. TM left- Grey/pearly, normal light reflex and anatomy TM Right- Grey/pearly, normal light reflex and anatomy Hearing Assessment-normal to conversational speech at 2-5 feet.  Nose and sinus-No sinus tenderness along frontal/maxillary region. External appearance normal and midline. Nares- bilateral quiet airflow, no discharge. Nasal mucosa- No bleeding noted and no ulcerations observed. Pink, moist. Turbinates non boggy. Lips- normal color, moist without cracks/lesions Oral Cavity/Palate- hard/soft palate intact without lesions, oral mucosa pink and moist. Dentition assessed and discussed appropriate oral care. Tongue normal midline.  Oropharynx- no pharyngeal erythema, Uvula midline. No post nasal drip. No exudate. Salivary glands- Non tender to palpation    CHEST/LUNG: Inspection- symmetric chest wall no pectus deformity. Normal effort, no distress, no use of accessory muscles. Palpation- nontender sternum, ribline.  No abnormal pulsations. Auscultation- Breath sounds normal throughout all lung fields.  Normal tracheal sounds, Normal bronchial sounds overlying sternum, Bronchovessicular sounds normal between scapulae posteriorly, Normal vessicular breath sounds heard throughout periphery. Lungs are clear today. Adventitious sounds- No wheezes, rales, rhonchi.     CARDIOVASCULAR:  Carotid artery- normal, no bruits or abnormal pulsations. Jugular vein- no pulsations.  Palpation/Percussion- Normal PMI, no palpable thrill  Auscultation- Regular rate and rhythm. No murmur noted in sitting, supine positions. Extremities- no digital clubbing, cyanosis, edema, increased warmth.    ABDOMEN: Inspection- normal and no visible pulsations. Normal contour. Auscultation- Bowel sounds normal, no abdominal bruits. Palpation/Percussion- soft, non-tender, no rebound tenderness, no rigidity (guarding), no jar tenderness, no masses.  Liver-no hepatomegaly, Spleen - no splenomegaly, Hernias- none. Rectal not examined.     Peripheral Vascular: Upper extremity Left- Normal temperature with pink nailbeds and no ulcerations.  Upper extremity Right- Normal temperature with pink nailbeds and no ulcerations.  Lower extremity- Normal temperature with pink nailbeds and no ulcerations. DP pulses 2+ bilaterally.  Pedal hair intact.  Normal capillary refill. Edema- No edema.    Musculoskeletal:  digital clubbing or cyanosis, neurovascularly intact all four extremities.  Upper extremity- Symmetrical posture.  No visible deformity.  Normal sensation along medial and lateral upper extremity proximally and distally.  NO tenderness overlying shoulder, lateral/medial epicondyle.  5/5 and strength 5/5 bilateral UE.  Elbow palpated, no tenderness overlying olecranon.  Normal supination, pronation to active/passive ROM and to resisted rotation. Bicep insertion/tricep insertion appear normal without obvious pathology. Rotator cuff evaluated and intact.  Normal wrist ROM bilaterally. Normal hand movement, intrinsic muscles of hands normal. No tenderness to palpation of hands/wrists/elbows.  Bilateral knees Lower extremity- Tenderness on palpation of bilateral knees, 1+ swelling noted.  No erythema of surrounding tissue, normal strength and tone.  Has decreased rom and difficulty with straight leg raises.  No tenderness at tibial tuberosity. Ankle normal ROM not tender to palpation along medial/lateral malleolus.  Normal movement of toes, no tenderness bilateral feet/toes.  Normal foot type. Calves symmetrical.  Stretching demonstrated today.  Spine/Ribs- No deformities, masses or tenderness, no known fractures, normal strength, Normal ROM. Normal stability No tenderness along C/T/L spine.  Normal appearing ROM about spine.      Neuropsych: Oriented- Person, place, time. (AAOx3), Mood/affect- normal and congruent. Able to articulate well. Speech-Normal speech, normal rate, normal tone, normal use of language, volume and coherence.  Thought content- normal with ability to perform basic computations and apply abstract thought/reason. Associations- intact, no SI/HI, no hallucinations, delusions, obsessions.  Judgment/insight- Appropriate. Memory-Recall intact, remote and recent memory intact. Knowledge- Age appropriate fund of knowledge, concentration and attention span normal.    Lymphatic: Head/Neck- normal size and non tender to palpation. Axillary- Head and neck LN are normal size and non tender to palpation. Femoral and Inguinal- normal size and non tender to palpation.      Assessment/Plan:  Jordy was seen today for hypothyroidism.    Diagnoses and all orders for this visit:    Chronic pain of both knees  -     diclofenac (VOLTAREN) 1 % gel gel; Apply 4 g topically to the appropriate area as directed 4 (Four) Times a Day As Needed (knee pain).  -     Comprehensive metabolic panel  -     CBC & Differential    Vitamin D deficiency  -     ergocalciferol (ERGOCALCIFEROL) 10104 units capsule; Take 1 capsule by mouth 1 (One) Time Per Week.    Hypothyroidism, unspecified type  -     levothyroxine (SYNTHROID, LEVOTHROID) 175 MCG tablet; Take 1 tablet by mouth Daily.  -     TSH    Gastroesophageal reflux disease without esophagitis  -     omeprazole (priLOSEC) 20 MG capsule; Take 1 capsule by mouth Daily.    Moderate persistent asthma, unspecified whether complicated  -     albuterol (PROVENTIL HFA;VENTOLIN HFA) 108 (90 Base)  MCG/ACT inhaler; Inhale 2 puffs Every 4 (Four) Hours As Needed for Wheezing.    Abnormal laboratory test  -     Comprehensive metabolic panel  -     TSH  -     CBC & Differential    Offered  toradol pt declines.     Morbid obesity:  BMI:  41.9     Weight:    244    Follow up plan:  Lose at least 3 pounds before next chronic visit, exercise at least 3 times a week for 30 minute increments, eat baked instead of fried foods, and eat healthier     -  Documentation of education   The patient BMI is outside this range and we recommended/discussed today to utilize a diet/exercise program to get back into the appropriate range.  Federal guidelines recommend that people under the age of 65 should have a BMI of 18.5-24.9  Food diary:  Bring to next visit  tial step is to document everything that is consumed. Pt's that have a food diary  Lose 2x the weight  by keeping track of foods.   Choose one bad food weekly and eliminate it from your diet.  Replace with one healthy food  Exercise diary: Goal over next 2-4 weeks is walk 30 minutes per day 5 days per week at pace difficult to hold conversation.   Drink more water, less soda.   Cut back on portion sizes.   Today we encouraged roughly a 1 lb per week weight loss with initial goal of 5% weight loss.  Avoid fast foods, eat more salads  If eating out for a meal, consider cutting food in half and placing into a to go container.  Individually portion any foods coming into the home   Use smaller plates  Drink 12 ozof water 30 minutes before meal as way to suppress appetite.  Discussed Contrave, metformin, topamax, Belviq and the cost of medications  Encouraged internet programs or self help books  Set  Goals that are realistic   Educated on ways to measure food  Baseball: 1 cup good for green salad, frozen yogurt, medium piece of fruit  Handful:  ½ cup good cut fruit, cooked vegetables, pasta, rice  Egg:  ¼ cup good for dried fruit  Deck of cards: 3 ounces good for meat and  poultry  Check book:  3 ounces grilled fish  Plate Method:  reduce plate size to 9 inch dinner plate.  Half of plate should be filled with non-starchy vegetables (broccoli, lettuce, cauliflower, tomatoes), ¼ plate with lean source of protein (lean chicken, turkey, fish), remaining ½ with whole grains (brown rice, potato, whole grain breads  Avoid liquid calories (regular soda, juice, coffee with cream)  Focus  on water, seltzer water and other non-calorie drinks  Replace regular sugar with non-caloric sweeteners  Avoid skipping meals: plan small regular meals throughout the day in order to keep your hunger controlled  Consider using meal replacements if unable to plan a healthy meal (protein shake, high protein bar)  Replace all white bread with whole wheat/whole grain alternative  Swap regular salad dressings (mayonnaise, butter, or low fat or fat free alternative)  Avoid high fat, high calorie, high carbohydrate snakes (cookies, pastries, cakes)  Snack on fruits, low fat dairy (yogurt, cottage cheese)            Management plan:  Take medications as prescribed; return to the clinic of new or worsening concerns.       Risks/benefits of current and new medications discussed with the patient and or family today.  The patient/family are aware and accept that if there any side effects they should call or return to clinic as soon as possible.  Appropriate F/U discussed for topics addressed today. All questions were answered to the satisfactory state of patient/family.  Should symptoms fail to improve or worsen they agree to call or return to clinic or to go to the ER. Education handouts were offered on any new Rx if requested.  Discussed the importance of following up with any needed screening tests/labs/specialist appointments and any requested follow-up recommended by me today.  Importance of maintaining follow-up discussed and patient accepts that missed appointments can delay diagnosis and potentially lead to  worsening of conditions.    Return in about 2 weeks (around 12/5/2018).    Binta Mayo, DNP, APRN  11/21/2018

## 2018-11-22 LAB
ALBUMIN SERPL-MCNC: 4.6 G/DL (ref 3.5–5)
ALBUMIN/GLOB SERPL: 1.4 G/DL (ref 1.1–2.5)
ALP SERPL-CCNC: 82 U/L (ref 24–120)
ALT SERPL-CCNC: 36 U/L (ref 0–54)
AST SERPL-CCNC: 25 U/L (ref 7–45)
BASOPHILS # BLD AUTO: 0.08 10*3/MM3 (ref 0–0.2)
BASOPHILS NFR BLD AUTO: 0.6 % (ref 0–2)
BILIRUB SERPL-MCNC: 0.5 MG/DL (ref 0.1–1)
BUN SERPL-MCNC: 15 MG/DL (ref 5–21)
BUN/CREAT SERPL: 23.1 (ref 7–25)
CALCIUM SERPL-MCNC: 9.8 MG/DL (ref 8.4–10.4)
CHLORIDE SERPL-SCNC: 101 MMOL/L (ref 98–110)
CO2 SERPL-SCNC: 30 MMOL/L (ref 24–31)
CREAT SERPL-MCNC: 0.65 MG/DL (ref 0.5–1.4)
EOSINOPHIL # BLD AUTO: 0.48 10*3/MM3 (ref 0–0.7)
EOSINOPHIL NFR BLD AUTO: 3.8 % (ref 0–4)
ERYTHROCYTE [DISTWIDTH] IN BLOOD BY AUTOMATED COUNT: 13.8 % (ref 12–15)
GLOBULIN SER CALC-MCNC: 3.2 GM/DL
GLUCOSE SERPL-MCNC: 110 MG/DL (ref 70–100)
HCT VFR BLD AUTO: 42 % (ref 37–47)
HGB BLD-MCNC: 13.9 G/DL (ref 12–16)
IMM GRANULOCYTES # BLD: 0.08 10*3/MM3 (ref 0–0.03)
IMM GRANULOCYTES NFR BLD: 0.6 % (ref 0–5)
LYMPHOCYTES # BLD AUTO: 3.16 10*3/MM3 (ref 0.72–4.86)
LYMPHOCYTES NFR BLD AUTO: 24.8 % (ref 15–45)
MCH RBC QN AUTO: 31.4 PG (ref 28–32)
MCHC RBC AUTO-ENTMCNC: 33.1 G/DL (ref 33–36)
MCV RBC AUTO: 94.8 FL (ref 82–98)
MONOCYTES # BLD AUTO: 0.82 10*3/MM3 (ref 0.19–1.3)
MONOCYTES NFR BLD AUTO: 6.4 % (ref 4–12)
NEUTROPHILS # BLD AUTO: 8.13 10*3/MM3 (ref 1.87–8.4)
NEUTROPHILS NFR BLD AUTO: 63.8 % (ref 39–78)
NRBC BLD AUTO-RTO: 0 /100 WBC (ref 0–0)
PLATELET # BLD AUTO: 247 10*3/MM3 (ref 130–400)
POTASSIUM SERPL-SCNC: 4.1 MMOL/L (ref 3.5–5.3)
PROT SERPL-MCNC: 7.8 G/DL (ref 6.3–8.7)
RBC # BLD AUTO: 4.43 10*6/MM3 (ref 4.2–5.4)
SODIUM SERPL-SCNC: 144 MMOL/L (ref 135–145)
TSH SERPL DL<=0.005 MIU/L-ACNC: 7.27 MIU/ML (ref 0.47–4.68)
WBC # BLD AUTO: 12.75 10*3/MM3 (ref 4.8–10.8)

## 2018-11-26 DIAGNOSIS — E03.9 HYPOTHYROIDISM, UNSPECIFIED TYPE: Primary | ICD-10-CM

## 2018-11-26 DIAGNOSIS — R73.01 IMPAIRED FASTING GLUCOSE: Primary | ICD-10-CM

## 2018-11-26 RX ORDER — LEVOTHYROXINE SODIUM 0.05 MG/1
TABLET ORAL
Qty: 45 TABLET | Refills: 0 | Status: SHIPPED | OUTPATIENT
Start: 2018-11-26 | End: 2019-02-25 | Stop reason: SDUPTHER

## 2018-11-30 ENCOUNTER — OFFICE VISIT (OUTPATIENT)
Dept: FAMILY MEDICINE CLINIC | Facility: CLINIC | Age: 53
End: 2018-11-30

## 2018-11-30 VITALS
RESPIRATION RATE: 20 BRPM | DIASTOLIC BLOOD PRESSURE: 83 MMHG | BODY MASS INDEX: 41.07 KG/M2 | SYSTOLIC BLOOD PRESSURE: 135 MMHG | HEIGHT: 64 IN | OXYGEN SATURATION: 98 % | TEMPERATURE: 98 F | HEART RATE: 73 BPM | WEIGHT: 240.6 LBS

## 2018-11-30 DIAGNOSIS — J20.9 ACUTE BRONCHITIS, UNSPECIFIED ORGANISM: Primary | ICD-10-CM

## 2018-11-30 DIAGNOSIS — R73.01 IMPAIRED FASTING GLUCOSE: ICD-10-CM

## 2018-11-30 DIAGNOSIS — J01.00 ACUTE NON-RECURRENT MAXILLARY SINUSITIS: ICD-10-CM

## 2018-11-30 LAB — HBA1C MFR BLD: 6 %

## 2018-11-30 PROCEDURE — 96372 THER/PROPH/DIAG INJ SC/IM: CPT | Performed by: NURSE PRACTITIONER

## 2018-11-30 PROCEDURE — 83036 HEMOGLOBIN GLYCOSYLATED A1C: CPT | Performed by: NURSE PRACTITIONER

## 2018-11-30 PROCEDURE — 99214 OFFICE O/P EST MOD 30 MIN: CPT | Performed by: NURSE PRACTITIONER

## 2018-11-30 RX ORDER — DEXTROMETHORPHAN HYDROBROMIDE AND PROMETHAZINE HYDROCHLORIDE 15; 6.25 MG/5ML; MG/5ML
5 SYRUP ORAL 4 TIMES DAILY PRN
Qty: 120 ML | Refills: 0 | Status: SHIPPED | OUTPATIENT
Start: 2018-11-30 | End: 2019-01-04 | Stop reason: SDUPTHER

## 2018-11-30 RX ORDER — CLARITHROMYCIN 500 MG/1
500 TABLET, COATED ORAL 2 TIMES DAILY
Qty: 20 TABLET | Refills: 0 | Status: SHIPPED | OUTPATIENT
Start: 2018-11-30 | End: 2018-12-10

## 2018-11-30 RX ORDER — DEXAMETHASONE SODIUM PHOSPHATE 10 MG/ML
10 INJECTION INTRAMUSCULAR; INTRAVENOUS ONCE
Status: COMPLETED | OUTPATIENT
Start: 2018-11-30 | End: 2018-11-30

## 2018-11-30 RX ADMIN — DEXAMETHASONE SODIUM PHOSPHATE 10 MG: 10 INJECTION INTRAMUSCULAR; INTRAVENOUS at 10:24

## 2018-11-30 NOTE — PROGRESS NOTES
Chief Complaint   Patient presents with   • Nasal Congestion     Cough and congestion.   Complaints of abdomen being sore from coughing so much.          Allergies   Allergen Reactions   • Contrast Dye Anaphylaxis   • Diclofenac Dizziness   • Sulfa Antibiotics Rash     HPI:  Jordy Mir is a 53 y.o. female presents today with complaints of being congested, coughing, headache for the last 4 days that is progressively getting worse.  When she coughs it feels like her head is going to blow up.  Denies any fever has had chills and felt warm.  Has taking halls cough drops and vicks vapor rub and albuterol with some improvement.          Chronic problems: chronic knee pain somewhat stable with diclofenac, vit d def stable with ergocalciferol, hypothyroidism stable with levothyroxine and GERD stable with omeprazole.     Past Medical History:   Diagnosis Date   • Disease of thyroid gland    • GERD (gastroesophageal reflux disease)    • Narcolepsy      Past Surgical History:   Procedure Laterality Date   •  SECTION     • CHOLECYSTECTOMY     • COLONOSCOPY     • HYSTERECTOMY     • OOPHORECTOMY       Social History     Socioeconomic History   • Marital status:      Spouse name: Not on file   • Number of children: Not on file   • Years of education: Not on file   • Highest education level: Not on file   Tobacco Use   • Smoking status: Former Smoker   • Smokeless tobacco: Never Used   • Tobacco comment: Quit in    Substance and Sexual Activity   • Alcohol use: No   • Drug use: No   • Sexual activity: Defer     Family History   Problem Relation Age of Onset   • Arthritis Mother    • Colon polyps Mother    • Arthritis Father    • No Known Problems Sister    • Narcolepsy Daughter    • Depression Maternal Grandmother    • Depression Paternal Grandfather        Current Outpatient Medications on File Prior to Visit   Medication Sig Dispense Refill   • albuterol (PROVENTIL HFA;VENTOLIN HFA) 108 (90 Base)  "MCG/ACT inhaler Inhale 2 puffs Every 4 (Four) Hours As Needed for Wheezing. 1 inhaler 5   • diclofenac (VOLTAREN) 1 % gel gel Apply 4 g topically to the appropriate area as directed 4 (Four) Times a Day As Needed (knee pain). 100 g 5   • ergocalciferol (ERGOCALCIFEROL) 35860 units capsule Take 1 capsule by mouth 1 (One) Time Per Week. 12 capsule 1   • levothyroxine (SYNTHROID) 50 MCG tablet Take levothyroxine 50 mcg on Monday, Wednesday and Saturday, and continue to take the 175mcg daily 45 tablet 0   • levothyroxine (SYNTHROID, LEVOTHROID) 175 MCG tablet Take 1 tablet by mouth Daily. 90 tablet 1   • omeprazole (priLOSEC) 20 MG capsule Take 1 capsule by mouth Daily. 90 capsule 1   • polyethyl glycol-propyl glycol (SYSTANE) 0.4-0.3 % solution ophthalmic solution Every 1 (One) Hour As Needed.       No current facility-administered medications on file prior to visit.         ROS:  REVIEW OF SYMPTOMS: (Positives bolded)  General:  weight loss, fever, chills, night sweats, fatigue, appetite loss  HEENT:   sore throat tinnitus, bloody nose, hearing loss, sinus pain/pressure, ear pain/pressure.   Respiratory: shortness of breath, cough, hemoptysis, wheezing, pleurisy,   Cardiovascular:  chest pain, PND, palpitation, edema, orthopnea, syncope, swelling of extremities  Gastro: Nausea, vomiting, diarrhea, hematemesis, abdominal pain, constipation  Neuro:  Migraine, numbness, ataxia, tremor, vertigo, weakness, memory loss, Irritability, dizziness  Allergic/immune: allergic rhinitis, hay fever, asthma, hives  \"All other systems reviewed and negative, except as listed above.”      OBJECTIVE:  Constitutional:  Alert, oriented x 3, well developed, well nourished. Consistent with stated age. Not in acute distress.  Has normal posture. Gait and station normal. .  Behavior appropriate. Patient is pleasant and cooperative with the interview and exam.    Skin: No rashes, no visible scars or suspicious moles noted.  Skin is warm to " touch. Normal appropriate skin turgor.  Capillary refill is normal bilateral Upper and lower extremity.     Head/Neck: Head is normocephalic and atraumatic.  Neck without visible/palpable lumps.  No thyromegaly.    Eye: Bilaterally EOMI.  PERRLA.  Sclera/conjunctiva is normal, no discharge. Cornea is normal and clear. Lens is normal.  Eyeball normal. Upper eyelid normal.  Lower eyelid normal.     OROPHARYNX: Mucosa pink and moist, posterior pharynx erythematous, with post nasal drip. Dentition average for age. No obvious dental carries. No lesions. Tongue normal.    EARS: Bilateral auditory canal normal, without redness or cerumen impaction. Bilateral Tympanic membrane Normal, pearly gray with good cone of light and landmarks.  Hearing grossly intact to normal conversation.     NOSE: Purulent drainage, mucosa is erythematous, edematous and congested, nares patent    SINUS:  Frontal and maxillary sinus tenderness on palpation.       CHEST/LUNG: No use of accessory muscles, chest non-tender on palpation. Wheezes throughout lung fields.  No rales, rhonchi.    CARDIOVASCULAR:  Inspection: Carotid artery bilateral normal, no bruits, pulse regular.  Palpation/Percussion Bilateral normal pulsations.  Auscultation: Regular rate and rhythm. No murmur noted in sitting, supine, standing or squatting positions.    LYMPH: Cervical Nodes-normal, size; non-tender to palpation. Axillary Nodes- normal size; non-tender to palpation.     Assessment:  Jordy was seen today for nasal congestion.    Diagnoses and all orders for this visit:    Acute bronchitis, unspecified organism  -     dexamethasone (DECADRON) injection 10 mg; Inject 1 mL into the appropriate muscle as directed by prescriber 1 (One) Time.  -     promethazine-dextromethorphan (PROMETHAZINE-DM) 6.25-15 MG/5ML syrup; Take 5 mL by mouth 4 (Four) Times a Day As Needed for Cough.  -     clarithromycin (BIAXIN) 500 MG tablet; Take 1 tablet by mouth 2 (Two) Times a Day for 10  days.    Acute non-recurrent maxillary sinusitis  -     clarithromycin (BIAXIN) 500 MG tablet; Take 1 tablet by mouth 2 (Two) Times a Day for 10 days.        I spoke with the patient about the benefits and risks associated with antibiotic therapy; the benefits include treating a bacterial infection, preventing the spread of disease, and minimizing serious complications associated with bacterial diseases; the risks include antibiotic resistance, allergic reactions, oral and/ or vaginal candidia, and GI related side effects such as an upset stomach and diarrhea, as well as placing the patient at an increase risk for C-diff; verbal acknowledgement of these risk were obtained; based on the patients complaint and clinical finding, no antibiotics are required at this time.             Return in about 1 week (around 12/7/2018), or if symptoms worsen or fail to improve.    Binta Mayo, DNP, APRN-BC  11/30/2018

## 2018-12-05 ENCOUNTER — OFFICE VISIT (OUTPATIENT)
Dept: FAMILY MEDICINE CLINIC | Facility: CLINIC | Age: 53
End: 2018-12-05

## 2018-12-05 VITALS
HEART RATE: 90 BPM | DIASTOLIC BLOOD PRESSURE: 91 MMHG | OXYGEN SATURATION: 96 % | RESPIRATION RATE: 18 BRPM | SYSTOLIC BLOOD PRESSURE: 124 MMHG | WEIGHT: 240 LBS | TEMPERATURE: 97.6 F | HEIGHT: 64 IN | BODY MASS INDEX: 40.97 KG/M2

## 2018-12-05 DIAGNOSIS — G89.29 CHRONIC PAIN OF BOTH KNEES: Primary | ICD-10-CM

## 2018-12-05 DIAGNOSIS — M25.562 CHRONIC PAIN OF BOTH KNEES: Primary | ICD-10-CM

## 2018-12-05 DIAGNOSIS — M25.561 CHRONIC PAIN OF BOTH KNEES: Primary | ICD-10-CM

## 2018-12-05 PROCEDURE — 99213 OFFICE O/P EST LOW 20 MIN: CPT | Performed by: NURSE PRACTITIONER

## 2018-12-05 NOTE — PROGRESS NOTES
Subjective   Jordy Mir is a 53 y.o. female.     Jordy Mir is a 53 yr old female here for complaints of chronic bilateral knee pain that she has had since May 2018.  I sent her to ortho and they did bilateral knee injections and she says she will never do that again because the injections only lasted 3 weeks.  Said if it didn't work then she could try the voltaren gel.  She says she is not going back to him.       Knee Pain           The following portions of the patient's history were reviewed and updated as appropriate: allergies, current medications, past family history, past medical history, past social history, past surgical history and problem list.    Review of Systems   Constitutional: Positive for activity change. Negative for chills, fatigue and fever.   Respiratory: Negative for chest tightness and wheezing.    Cardiovascular: Negative for chest pain, palpitations and leg swelling.   Musculoskeletal: Positive for arthralgias, gait problem and joint swelling.   Allergic/Immunologic: Negative for environmental allergies, food allergies and immunocompromised state.   Psychiatric/Behavioral: Negative for behavioral problems, decreased concentration and depressed mood.   All other systems reviewed and are negative.      Objective   Physical Exam   Constitutional: She is oriented to person, place, and time. She appears well-developed and well-nourished. She is cooperative.   HENT:   Head: Normocephalic and atraumatic.   Right Ear: Hearing normal.   Left Ear: Hearing normal.   Nose: Nose normal.   Mouth/Throat: Uvula is midline, oropharynx is clear and moist and mucous membranes are normal.   Cardiovascular: Normal rate, regular rhythm and normal heart sounds.   Pulmonary/Chest: Effort normal and breath sounds normal.   Musculoskeletal:        Right knee: She exhibits decreased range of motion and swelling. Tenderness found.        Left knee: She exhibits decreased range of motion and swelling. She  exhibits no erythema. Tenderness found.        Legs:  Neurological: She is alert and oriented to person, place, and time. She has normal strength. No cranial nerve deficit or sensory deficit. She displays a negative Romberg sign.   Psychiatric: She has a normal mood and affect. Her speech is normal and behavior is normal. Judgment and thought content normal. Cognition and memory are normal.   Nursing note and vitals reviewed.        Assessment/Plan   Jordy was seen today for knee pain.    Diagnoses and all orders for this visit:    Chronic pain of both knees      Spoke to Adia Pharmacist at [facility] and she is going to compound a pain cream that has NSAID, lidocaine, and muscle relaxer.  Pt is going to try it and if we need to do adjustments to it we can add other meds  Encouraged pt to follow up with ortho but she declines    Return in about 1 month (around 1/5/2019) for Recheck knee pain.    Binta Mayo, TISHA, APRN-BC  12/05/2018

## 2019-01-04 ENCOUNTER — OFFICE VISIT (OUTPATIENT)
Dept: FAMILY MEDICINE CLINIC | Facility: CLINIC | Age: 54
End: 2019-01-04

## 2019-01-04 VITALS
BODY MASS INDEX: 41.96 KG/M2 | TEMPERATURE: 97.7 F | HEART RATE: 78 BPM | RESPIRATION RATE: 20 BRPM | SYSTOLIC BLOOD PRESSURE: 139 MMHG | WEIGHT: 245.8 LBS | DIASTOLIC BLOOD PRESSURE: 82 MMHG | OXYGEN SATURATION: 97 % | HEIGHT: 64 IN

## 2019-01-04 DIAGNOSIS — G89.29 CHRONIC PAIN OF BOTH KNEES: Primary | ICD-10-CM

## 2019-01-04 DIAGNOSIS — M25.561 CHRONIC PAIN OF BOTH KNEES: Primary | ICD-10-CM

## 2019-01-04 DIAGNOSIS — J20.9 ACUTE BRONCHITIS, UNSPECIFIED ORGANISM: ICD-10-CM

## 2019-01-04 DIAGNOSIS — M25.562 CHRONIC PAIN OF BOTH KNEES: Primary | ICD-10-CM

## 2019-01-04 PROCEDURE — 99214 OFFICE O/P EST MOD 30 MIN: CPT | Performed by: NURSE PRACTITIONER

## 2019-01-04 RX ORDER — DEXTROMETHORPHAN HYDROBROMIDE AND PROMETHAZINE HYDROCHLORIDE 15; 6.25 MG/5ML; MG/5ML
5 SYRUP ORAL 4 TIMES DAILY PRN
Qty: 120 ML | Refills: 0 | Status: SHIPPED | OUTPATIENT
Start: 2019-01-04 | End: 2023-03-30

## 2019-01-04 RX ORDER — PREDNISONE 20 MG/1
20 TABLET ORAL DAILY
Qty: 5 TABLET | Refills: 0 | Status: SHIPPED | OUTPATIENT
Start: 2019-01-04 | End: 2019-01-09

## 2019-01-04 NOTE — PATIENT INSTRUCTIONS
Knee Pain, Adult  Knee pain in adults is common. It can be caused by many things, including:  · Arthritis.  · A fluid-filled sac (cyst) or growth in your knee.  · An infection in your knee.  · An injury that will not heal.  · Damage, swelling, or irritation of the tissues that support your knee.    Knee pain is usually not a sign of a serious problem. The pain may go away on its own with time and rest. If it does not, a health care provider may order tests to find the cause of the pain. These may include:  · Imaging tests, such as an X-ray, MRI, or ultrasound.  · Joint aspiration. In this test, fluid is removed from the knee.  · Arthroscopy. In this test, a lighted tube is inserted into knee and an image is projected onto a TV screen.  · A biopsy. In this test, a sample of tissue is removed from the body and studied under a microscope.    Follow these instructions at home:  Pay attention to any changes in your symptoms. Take these actions to relieve your pain.  Activity  · Rest your knee.  · Do not do things that cause pain or make pain worse.  · Avoid high-impact activities or exercises, such as running, jumping rope, or doing jumping jacks.  General instructions  · Take over-the-counter and prescription medicines only as told by your health care provider.  · Raise (elevate) your knee above the level of your heart when you are sitting or lying down.  · Sleep with a pillow under your knee.  · If directed, apply ice to the knee:  ? Put ice in a plastic bag.  ? Place a towel between your skin and the bag.  ? Leave the ice on for 20 minutes, 2-3 times a day.  · Ask your health care provider if you should wear an elastic knee support.  · Lose weight if you are overweight. Extra weight can put pressure on your knee.  · Do not use any products that contain nicotine or tobacco, such as cigarettes and e-cigarettes. Smoking may slow the healing of any bone and joint problems that you may have. If you need help quitting, ask  your health care provider.  Contact a health care provider if:  · Your knee pain continues, changes, or gets worse.  · You have a fever along with knee pain.  · Your knee rachael or locks up.  · Your knee swells, and the swelling becomes worse.  Get help right away if:  · Your knee feels warm to the touch.  · You cannot move your knee.  · You have severe pain in your knee.  · You have chest pain.  · You have trouble breathing.  Summary  · Knee pain in adults is common. It can be caused by many things, including, arthritis, infection, cysts, or injury.  · Knee pain is usually not a sign of a serious problem, but if it does not go away, a health care provider may perform tests to know the cause of the pain.  · Pay attention to any changes in your symptoms. Relieve your pain with rest, medicines, light activity, and use of ice.  · Get help if your pain continues or becomes very severe, or if your knee rachael or locks up, or if you have chest pain or trouble breathing.  This information is not intended to replace advice given to you by your health care provider. Make sure you discuss any questions you have with your health care provider.  Document Released: 10/14/2008 Document Revised: 12/08/2017 Document Reviewed: 12/08/2017  [employer] Interactive Patient Education © 2018 [employer]

## 2019-01-04 NOTE — PROGRESS NOTES
Subjective     Chief Complaint   Patient presents with   • Knee Pain     Follow Up        History of Present Illness  Jordy Mir is a 53 y.o. female here today for complaints of continued knee pain. Rates the pain 8/10  Knee Pain    There was no injury mechanism. The pain is present in the left knee and right knee. The quality of the pain is described as aching, stabbing, shooting, burning and cramping (varies throughout the day). The pain is at a severity of 2/10. The pain is moderate. The pain has been intermittent since onset. Associated symptoms include an inability to bear weight, a loss of motion, a loss of sensation, muscle weakness, numbness and tingling. She reports no foreign bodies present. The symptoms are aggravated by movement and weight bearing. She has tried elevation, heat, ice, rest, NSAIDs and immobilization for the symptoms. The treatment provided mild relief.       Jordy Mir  has a past medical history of Disease of thyroid gland, GERD (gastroesophageal reflux disease), and Narcolepsy.  Allergies   Allergen Reactions   • Contrast Dye Anaphylaxis   • Diclofenac Dizziness   • Sulfa Antibiotics Rash       Current Outpatient Medications:   •  albuterol (PROVENTIL HFA;VENTOLIN HFA) 108 (90 Base) MCG/ACT inhaler, Inhale 2 puffs Every 4 (Four) Hours As Needed for Wheezing., Disp: 1 inhaler, Rfl: 5  •  ergocalciferol (ERGOCALCIFEROL) 73992 units capsule, Take 1 capsule by mouth 1 (One) Time Per Week., Disp: 12 capsule, Rfl: 1  •  levothyroxine (SYNTHROID) 50 MCG tablet, Take levothyroxine 50 mcg on Monday, Wednesday and Saturday, and continue to take the 175mcg daily, Disp: 45 tablet, Rfl: 0  •  levothyroxine (SYNTHROID, LEVOTHROID) 175 MCG tablet, Take 1 tablet by mouth Daily., Disp: 90 tablet, Rfl: 1  •  omeprazole (priLOSEC) 20 MG capsule, Take 1 capsule by mouth Daily., Disp: 90 capsule, Rfl: 1  •  polyethyl glycol-propyl glycol (SYSTANE) 0.4-0.3 % solution ophthalmic solution, Every  1 (One) Hour As Needed., Disp: , Rfl:   •  promethazine-dextromethorphan (PROMETHAZINE-DM) 6.25-15 MG/5ML syrup, Take 5 mL by mouth 4 (Four) Times a Day As Needed for Cough., Disp: 120 mL, Rfl: 0  Past Medical History:   Diagnosis Date   • Disease of thyroid gland    • GERD (gastroesophageal reflux disease)    • Narcolepsy      Past Surgical History:   Procedure Laterality Date   •  SECTION     • CHOLECYSTECTOMY     • COLONOSCOPY     • COLONOSCOPY N/A 2018    Procedure: COLONOSCOPY WITH ANESTHESIA;  Surgeon: Mary Segura MD;  Location: [facility] ENDOSCOPY;  Service: Gastroenterology   • HYSTERECTOMY     • OOPHORECTOMY       Social History     Socioeconomic History   • Marital status:      Spouse name: Not on file   • Number of children: Not on file   • Years of education: Not on file   • Highest education level: Not on file   Tobacco Use   • Smoking status: Former Smoker   • Smokeless tobacco: Never Used   • Tobacco comment: Quit in    Substance and Sexual Activity   • Alcohol use: No   • Drug use: No   • Sexual activity: Defer     Family History   Problem Relation Age of Onset   • Arthritis Mother    • Colon polyps Mother    • Arthritis Father    • No Known Problems Sister    • Narcolepsy Daughter    • Depression Maternal Grandmother    • Depression Paternal Grandfather        Family history, surgical history, past medical history, Allergies and med's reviewed with patient today and updated in [facility] EMR.     ROS:  Review of Systems   Constitutional: Negative for chills and fatigue.   HENT: Positive for congestion. Negative for rhinorrhea, sinus pressure, sinus pain and sore throat.    Eyes: Negative.  Negative for pain, discharge and itching.   Respiratory: Positive for cough, shortness of breath and wheezing. Negative for chest tightness.    Cardiovascular: Negative.  Negative for chest pain, palpitations and leg swelling.   Gastrointestinal: Negative.  Negative for diarrhea, nausea and  vomiting.   Genitourinary: Negative.    Musculoskeletal: Positive for arthralgias, back pain, gait problem, joint swelling and myalgias.   Neurological: Positive for tingling, weakness, numbness and headaches. Negative for light-headedness.   All other systems reviewed and are negative.         Objective   Vitals:    01/04/19 0940   BP: 139/82   Pulse: 78   Resp: 20   Temp: 97.7 °F (36.5 °C)   SpO2: 97%     Physical Exam   Constitutional: She is oriented to person, place, and time. She appears well-developed and well-nourished. She is cooperative. No distress.   HENT:   Head: Normocephalic.   Right Ear: Tympanic membrane normal.   Left Ear: Tympanic membrane normal.   Mouth/Throat: Uvula is midline and mucous membranes are normal. No tonsillar exudate.   Eyes: Conjunctivae, EOM and lids are normal. Pupils are equal, round, and reactive to light. Lids are everted and swept, no foreign bodies found.   Neck: Trachea normal, normal range of motion and full passive range of motion without pain. Neck supple.   Cardiovascular: Normal rate, regular rhythm, S1 normal, S2 normal and normal heart sounds.   Pulmonary/Chest: Effort normal and breath sounds normal.   Abdominal: Soft. Normal appearance and bowel sounds are normal.   Musculoskeletal: She exhibits edema and tenderness.        Right knee: She exhibits decreased range of motion and swelling.        Left knee: She exhibits decreased range of motion and swelling.   Neurological: She is alert and oriented to person, place, and time. She has normal strength.   Skin: Skin is warm, dry and intact. Capillary refill takes less than 2 seconds. She is not diaphoretic.   Psychiatric: She has a normal mood and affect. Her speech is normal and behavior is normal. Judgment and thought content normal. Cognition and memory are normal.   Nursing note and vitals reviewed.         Assessment/Plan   Jordy was seen today for knee pain.    Diagnoses and all orders for this visit:    Chronic  pain of both knees  -     Ambulatory Referral to Orthopedic Surgery    Acute bronchitis, unspecified organism  -     promethazine-dextromethorphan (PROMETHAZINE-DM) 6.25-15 MG/5ML syrup; Take 5 mL by mouth 4 (Four) Times a Day As Needed for Cough.  -     predniSONE (DELTASONE) 20 MG tablet; Take 1 tablet by mouth Daily for 5 days.         Management Plan:   Risks/benefits of current and new medications discussed with the patient and or family today.  The patient/family are aware and accept that if there any side effects they should call or return to clinic as soon as possible.  Appropriate F/U discussed for topics addressed today. All questions were answered to the satisfactory state of patient/family.  Should symptoms fail to improve or worsen they agree to call or return to clinic or to go to the ER. Education handouts were offered on any new Rx if requested.  Discussed the importance of following up with any needed screening tests/labs/specialist appointments and any requested follow-up recommended by me today.  Importance of maintaining follow-up discussed and patient accepts that missed appointments can delay diagnosis and potentially lead to worsening of conditions.    An After Visit Summary was printed and given to the patient at discharge.    Return in about 1 month (around 2/4/2019) for Recheck knee.    Binta Mayo, DNP, APRN-BC    1/4/2019   9:51 AM      This note was electronically signed.

## 2019-02-25 DIAGNOSIS — E03.9 HYPOTHYROIDISM, UNSPECIFIED TYPE: ICD-10-CM

## 2019-02-25 RX ORDER — LEVOTHYROXINE SODIUM 0.05 MG/1
TABLET ORAL
Qty: 45 TABLET | Refills: 0 | Status: SHIPPED | OUTPATIENT
Start: 2019-02-25 | End: 2019-02-26

## 2019-02-25 NOTE — TELEPHONE ENCOUNTER
Pt was suppose to come in and have another TSH.  I need her to come in so I can re-evaluate her levels before I order more meds.

## 2019-02-25 NOTE — TELEPHONE ENCOUNTER
Patient has been notified she will report for labs however she is down to taking her last pill today.

## 2019-02-26 ENCOUNTER — RESULTS ENCOUNTER (OUTPATIENT)
Dept: FAMILY MEDICINE CLINIC | Facility: CLINIC | Age: 54
End: 2019-02-26

## 2019-02-26 DIAGNOSIS — E03.9 HYPOTHYROIDISM, UNSPECIFIED TYPE: ICD-10-CM

## 2019-02-26 DIAGNOSIS — E03.9 ACQUIRED HYPOTHYROIDISM: Primary | ICD-10-CM

## 2019-02-26 LAB — TSH SERPL DL<=0.005 MIU/L-ACNC: 1.88 MIU/ML (ref 0.47–4.68)

## 2019-02-27 DIAGNOSIS — E03.9 HYPOTHYROIDISM, UNSPECIFIED TYPE: ICD-10-CM

## 2019-02-27 RX ORDER — LEVOTHYROXINE SODIUM 175 UG/1
175 TABLET ORAL DAILY
Qty: 90 TABLET | Refills: 0 | Status: SHIPPED | OUTPATIENT
Start: 2019-02-27 | End: 2019-05-29 | Stop reason: SDUPTHER

## 2019-05-27 ENCOUNTER — RESULTS ENCOUNTER (OUTPATIENT)
Dept: FAMILY MEDICINE CLINIC | Facility: CLINIC | Age: 54
End: 2019-05-27

## 2019-05-27 DIAGNOSIS — E03.9 ACQUIRED HYPOTHYROIDISM: ICD-10-CM

## 2019-05-29 DIAGNOSIS — E03.9 HYPOTHYROIDISM, UNSPECIFIED TYPE: ICD-10-CM

## 2019-05-29 LAB — TSH SERPL DL<=0.005 MIU/L-ACNC: 2.7 MIU/ML (ref 0.27–4.2)

## 2019-05-29 RX ORDER — LEVOTHYROXINE SODIUM 175 UG/1
175 TABLET ORAL DAILY
Qty: 90 TABLET | Refills: 0 | Status: SHIPPED | OUTPATIENT
Start: 2019-05-29 | End: 2019-09-03

## 2019-08-12 ENCOUNTER — OFFICE VISIT (OUTPATIENT)
Dept: FAMILY MEDICINE CLINIC | Facility: CLINIC | Age: 54
End: 2019-08-12

## 2019-08-12 VITALS
DIASTOLIC BLOOD PRESSURE: 76 MMHG | HEIGHT: 64 IN | HEART RATE: 75 BPM | BODY MASS INDEX: 39.91 KG/M2 | SYSTOLIC BLOOD PRESSURE: 147 MMHG | OXYGEN SATURATION: 98 % | RESPIRATION RATE: 18 BRPM | WEIGHT: 233.8 LBS | TEMPERATURE: 98 F

## 2019-08-12 DIAGNOSIS — K21.9 GASTROESOPHAGEAL REFLUX DISEASE WITHOUT ESOPHAGITIS: ICD-10-CM

## 2019-08-12 DIAGNOSIS — L98.9 SKIN LESION OF CHEST WALL: Primary | ICD-10-CM

## 2019-08-12 PROCEDURE — 99213 OFFICE O/P EST LOW 20 MIN: CPT | Performed by: NURSE PRACTITIONER

## 2019-08-12 NOTE — PROGRESS NOTES
Chief Complaint   Patient presents with   • Sores     Sores on face, chest and knee that will not got away.   Reports they have been there for approximately 3 weeks.          Allergies   Allergen Reactions   • Contrast Dye Anaphylaxis   • Diclofenac Dizziness   • Sulfa Antibiotics Rash       History provided by: self     HPI:  Subjective   Jordy Mir is a 54 y.o. female presents today for [x]  Skin lesion on  Left upper chest breast area  That has been there for about 6 months and it opens up and bleeds then heals over and has gotten larger.  She has another lesion on the right cheek that has been there about a week and she scratched it taken the top of the lesion off.  Complains of itching. Has not tried any lotions or creams on the lesions. Denies fever and chills    Pt is [x] Employed at [employer]    Chronic problems: [x] Hypothyroidism stable with levothyroxine,  [x] Gerd stable with omeprazole  [x] recurrent nausea stable with promethazine  [x] occupational asthma stable with albuterol     PCP currently listed as Binta Mayo, DNP, APRN.     Advance Directive: [x] Yes [x] Recommend getting us a copy    The following portions of the patient's history were reviewed and updated as appropriate: allergies, current medications, past family history, past medical history, past social history, past surgical history and problem list      Current Outpatient Medications:   •  levothyroxine (SYNTHROID, LEVOTHROID) 175 MCG tablet, Take 1 tablet by mouth Daily., Disp: 90 tablet, Rfl: 0  •  omeprazole (priLOSEC) 20 MG capsule, Take 1 capsule by mouth Daily., Disp: 90 capsule, Rfl: 1  •  polyethyl glycol-propyl glycol (SYSTANE) 0.4-0.3 % solution ophthalmic solution, Every 1 (One) Hour As Needed., Disp: , Rfl:   •  promethazine-dextromethorphan (PROMETHAZINE-DM) 6.25-15 MG/5ML syrup, Take 5 mL by mouth 4 (Four) Times a Day As Needed for Cough., Disp: 120 mL, Rfl: 0  •  albuterol (PROVENTIL HFA;VENTOLIN HFA)  108 (90 Base) MCG/ACT inhaler, Inhale 2 puffs Every 4 (Four) Hours As Needed for Wheezing., Disp: 1 inhaler, Rfl: 5  Past Medical History:   Diagnosis Date   • Disease of thyroid gland    • GERD (gastroesophageal reflux disease)    • Narcolepsy      Past Surgical History:   Procedure Laterality Date   •  SECTION     • CHOLECYSTECTOMY     • COLONOSCOPY     • COLONOSCOPY N/A 2018    Procedure: COLONOSCOPY WITH ANESTHESIA;  Surgeon: Mary Segura MD;  Location: [facility] ENDOSCOPY;  Service: Gastroenterology   • HYSTERECTOMY     • OOPHORECTOMY       Social History     Socioeconomic History   • Marital status:      Spouse name: Not on file   • Number of children: Not on file   • Years of education: Not on file   • Highest education level: Not on file   Tobacco Use   • Smoking status: Former Smoker   • Smokeless tobacco: Never Used   • Tobacco comment: Quit in    Substance and Sexual Activity   • Alcohol use: No   • Drug use: No   • Sexual activity: Defer     Family History   Problem Relation Age of Onset   • Arthritis Mother    • Colon polyps Mother    • Arthritis Father    • No Known Problems Sister    • Narcolepsy Daughter    • Depression Maternal Grandmother    • Depression Paternal Grandfather        REVIEW OF SYMPTOMS: (Positives bolded)  General:  weight loss, fever, chills, night sweats, fatigue, appetite loss  HEENT:  blurry vision, eye pain, eye discharge, dry eyes, decreased vision  Respiratory: shortness of breath, cough, hemoptysis, wheezing, pleurisy,   Cardiovascular:  chest pain, PND, palpitation, edema, orthopnea, syncope  Gastro: Nausea, vomiting, diarrhea, hematemesis, abdominal pain, constipation  Genito: hematuria, dysuria, glycosuria, hesitancy, frequency, incontinence  Musckelo: Arthralgia, myalgia, muscle weakness, joint swelling, NSAID use  Skin: rash, pruritis, sores, nail changes, skin thickening, change in lesion,  itching   Neuro:  Migraine, numbness, ataxia, tremor,  "vertigo, weakness, memory loss  \"All other systems reviewed and negative, except as listed above.”    OBJECTIVE:  Constitutional:  No acute distress, Consistent with stated age. Oriented x 3,  Gait  normal. Patient is pleasant and cooperative with the interview and exam.    Integumentary: No rashes, ulcers or edema.  Normal skin moisture/turgor. Skin is warm to touch, no increased warmth.   Has a small red lesion on the right cheek that looks like it has been scratched.  No drainage noted.  Has about a 2 mm lesion on the left chest/upper breast area that is red in color, borders are irregular.  Pt complains of it opening up and   Bleeding then healing over, has been present for 6 months.     CHEST/LUNG: Lungs clear throughout lung fields without rale, rhonchi or wheezes. no distress, no use of accessory muscles.       CARDIOVASCULAR:  Regular rate and rhythm. No murmur noted in sitting, supine positions.      ABDOMEN:  Bowel sounds normal, no abdominal bruits. Abd soft, non-tender, no rebound tenderness, no rigidity (guarding), no jar tenderness, no masses.  no hepatomegaly, no splenomegaly     Neuropsych: Normal speech, Thought- normal. No hallucinations, delusions, obsessions.   Appropriate judgement and memory.    Musculoskeletal:  digital clubbing or cyanosis, neurovascularly intact all four extremities.  Upper extremity- Symmetrical posture.  No visible deformity.  Normal sensation along medial and lateral upper extremity proximally and distally.  NO tenderness overlying shoulder, lateral/medial epicondyle.  5/5 and strength 5/5 bilateral UE.  Elbow palpated, no tenderness overlying olecranon.  Normal supination, pronation to active/passive ROM and to resisted rotation. Bicep insertion/tricep insertion appear normal without obvious pathology. Rotator cuff evaluated and intact.  Normal wrist ROM bilaterally. Normal hand movement, intrinsic muscles of hands normal. No tenderness to palpation of " "hands/wrists/elbows.  Lower extremity  Knee ROM normal at 0-120 degrees. No tenderness overlying trochanters, no tenderness about patella, quad tendon, patellar tendon. No tenderness at tibial tuberosity. Ankle normal ROM not tender to palpation along medial/lateral malleolus. Normal movement of toes, no tenderness bilateral feet/toes. Normal foot type. Calves symmetrical. Stretching demonstrated today  Cervical: Normal ROM with turning head right to left and touching chin to chest. Has no tenderness on palpation of the cervical spine  Lumbar Spine:  Normal ROM with bending over, twisting right and left.  No tenderness on palpation of the lumbar spine or sciatic notch.  Straight leg raises normal bilaterally. Can heel/toe walk.  Inversion/eversion normal     /76 (BP Location: Left arm, Patient Position: Sitting, Cuff Size: Large Adult)   Pulse 75   Temp 98 °F (36.7 °C)   Resp 18   Ht 162.6 cm (64.02\")   Wt 106 kg (233 lb 12.8 oz)   SpO2 98%   Breastfeeding? No   BMI 40.11 kg/m²     ASSESSMENT  Jordy was seen today for sores.    Diagnoses and all orders for this visit:    Skin lesion of chest wall  -     Ambulatory Referral to General Surgery          Obesity Plan:  BMI 40.1 Weight 233 Plan:  Lose 3 pounds before next visit  The patient BMI is outside this range and we recommended/discussed today to utilize a diet/exercise program to get back into the appropriate range.  Federal guidelines recommend that people under the age of 65 should have a BMI of 18.5-24.9  The initial step is to document everything that is consumed into a food diary. Studies have shown that patients can lose up to 2x the weight by keeping track of foods.   Choose one bad food weekly and eliminate it from your diet.  Replace with one healthy food  Goal over next 2-4 weeks is walk 30 minutes per day 5 days per week at pace difficult to hold conversation.   Drink more water, less soda.   Cut back on portion sizes.   Today we " encouraged roughly a 1 lb per week weight loss with initial goal of 5% weight loss.  Discussed if eating out for a meal, consider cutting food in half and placing into a to go container.  Individually portion any foods coming into the home based on package.  Use smaller plates  Drinking 12 oz of water 30 minutes before meal as way to suppress appetite.  Medications discussed today include metformin, topamax, phentermine, Qsymia, Belviq (lorcaserin), Contrave (Buproprion/naltrexone).    Lifestyle therapy   Simple advice to lose weight   Internet programs or self help books.    Advice from dietitian  Set Goals that are realistic   Encouraged to use visual aides to help in measuring foods  Baseball-1 cup good for green salad, frozen yogurt, medium piece of fruit, baked potato  Handful - ½ cup good cut fruit, cooked vegetables, pasta, rice  Egg- ¼ cup good for dried fruit  Deck of cards-3 ounces good for meat and poultry  Check book-3 ounces good for grilled fish  6 dice side by side- 1 ½ ounces good for natural cheese  Simple tips   Plate method-reduce plate size to 9 inch dinner plate.  Half of plate should be filled with non-starchy vegetables (broccoli, lettuce, cauliflower, tomatoes), ¼ plate with lean source of protein (lean chicken, turkey, fish), remaining ½ with whole grains (brown rice, potato, whole grain breads  Avoid liquid calories (regular soda, juice, coffee with cream)  Focus  on water, seltzer water and other non-calorie drinks  Replace regular sugar with non-caloric sweeteners  Avoid skipping meals: plan small regular meals throughout the day in order to keep your hunger controlled  Consider using meal replacements if unable to plan a healthy meal (protein shake, high protein bar)  Replace all white bread with whole wheat/whole grain alternative  Swap regular salad dressings (mayonnaise, butter, or low fat or fat free alternative)  Avoid high fat, high calorie, high carbohydrate snakes (cookies,  pastries, cakes)  Snack on fruits, low fat dairy (yogurt, cottage cheese)    Behavioral Modification  Self-Monitoring of dietary Intake-keep a dietary intake log. Obese individuals have been shown to underestimate their food intake  Behavioral treatment -gives exacts about amount and total calories  Read food labels    R/B/A of medications/treatment options d/w  the pt/family today. The patient/family are aware and accept that medications can have side effects.  If there any obvious side effects they should call or return to clinic as soon as possible.  Appropriate f/u discussed for topics addressed today. All questions were answered to the satisfactory state of patient/family.  Should symptoms fail to improve or worsen they agree to call or return to clinic or to go to the ER. Education handouts were offered on any new Rx if requested.  Discussed the importance of f/u with any needed screening tests/labs/specialist appointments and any requested follow-up recommended by me today.  Importance of maintaining f/u discussed and patient accepts that missed appointments can delay diagnosis and potentially lead to worsening of conditions.    Using medications as prescribed.  Discussed need to take regularly as prescribed, to avoid missing doses as able.  Medications reviewed with patient today. We discussed cessation/continuation of medications for current problem.  Needed Rx refills will be provided.  No side effects from medications.       Return in about 1 week (around 8/19/2019), or if symptoms worsen or fail to improve.    Electronically signed by Binta Mayo, TISHA, APRN, 08/12/19, 10:46 AM.

## 2019-08-28 DIAGNOSIS — E03.9 HYPOTHYROIDISM, UNSPECIFIED TYPE: Primary | ICD-10-CM

## 2019-08-29 LAB
FT4I SERPL CALC-MCNC: 2.5 (ref 1.2–4.9)
T3RU NFR SERPL: 28 % (ref 24–39)
T4 SERPL-MCNC: 9 UG/DL (ref 4.5–12)
TSH SERPL DL<=0.005 MIU/L-ACNC: 9.28 UIU/ML (ref 0.45–4.5)

## 2019-09-03 ENCOUNTER — TELEPHONE (OUTPATIENT)
Dept: FAMILY MEDICINE CLINIC | Facility: CLINIC | Age: 54
End: 2019-09-03

## 2019-09-03 DIAGNOSIS — E03.9 HYPOTHYROIDISM, UNSPECIFIED TYPE: Primary | ICD-10-CM

## 2019-09-03 RX ORDER — LEVOTHYROXINE SODIUM 0.2 MG/1
200 TABLET ORAL DAILY
Qty: 60 TABLET | Refills: 0 | Status: SHIPPED | OUTPATIENT
Start: 2019-09-03

## 2019-09-03 NOTE — TELEPHONE ENCOUNTER
Pt called and on 8/29/2019 increased dose of thyroid medication was supposed to have been sent in but was not.     Please review and advise

## 2021-03-20 ENCOUNTER — NURSE TRIAGE (OUTPATIENT)
Dept: CALL CENTER | Facility: HOSPITAL | Age: 56
End: 2021-03-20

## 2021-03-20 ENCOUNTER — HOSPITAL ENCOUNTER (EMERGENCY)
Facility: HOSPITAL | Age: 56
Discharge: HOME | End: 2021-03-20
Admitting: FAMILY MEDICINE

## 2021-03-20 VITALS
DIASTOLIC BLOOD PRESSURE: 74 MMHG | WEIGHT: 225 LBS | BODY MASS INDEX: 38.41 KG/M2 | TEMPERATURE: 98.4 F | RESPIRATION RATE: 18 BRPM | SYSTOLIC BLOOD PRESSURE: 130 MMHG | OXYGEN SATURATION: 98 % | HEART RATE: 71 BPM | HEIGHT: 64 IN

## 2021-03-20 DIAGNOSIS — M25.50 ARTHRALGIA, UNSPECIFIED JOINT: Primary | ICD-10-CM

## 2021-03-20 LAB
ALBUMIN SERPL-MCNC: 4.3 G/DL (ref 3.5–5.2)
ALBUMIN/GLOB SERPL: 1.2 G/DL
ALP SERPL-CCNC: 99 U/L (ref 39–117)
ALT SERPL W P-5'-P-CCNC: 38 U/L (ref 1–33)
ANION GAP SERPL CALCULATED.3IONS-SCNC: 8 MMOL/L (ref 5–15)
AST SERPL-CCNC: 24 U/L (ref 1–32)
BASOPHILS # BLD AUTO: 0.08 10*3/MM3 (ref 0–0.2)
BASOPHILS NFR BLD AUTO: 0.6 % (ref 0–1.5)
BILIRUB SERPL-MCNC: 0.4 MG/DL (ref 0–1.2)
BUN SERPL-MCNC: 16 MG/DL (ref 6–20)
BUN/CREAT SERPL: 21.3 (ref 7–25)
CALCIUM SPEC-SCNC: 9.5 MG/DL (ref 8.6–10.5)
CHLORIDE SERPL-SCNC: 98 MMOL/L (ref 98–107)
CO2 SERPL-SCNC: 32 MMOL/L (ref 22–29)
CREAT SERPL-MCNC: 0.75 MG/DL (ref 0.57–1)
CRP SERPL-MCNC: 2.57 MG/DL (ref 0–0.5)
DEPRECATED RDW RBC AUTO: 47.4 FL (ref 37–54)
EOSINOPHIL # BLD AUTO: 0.36 10*3/MM3 (ref 0–0.4)
EOSINOPHIL NFR BLD AUTO: 2.8 % (ref 0.3–6.2)
ERYTHROCYTE [DISTWIDTH] IN BLOOD BY AUTOMATED COUNT: 13.8 % (ref 12.3–15.4)
ERYTHROCYTE [SEDIMENTATION RATE] IN BLOOD: 17 MM/HR (ref 0–20)
GFR SERPL CREATININE-BSD FRML MDRD: 81 ML/MIN/1.73
GLOBULIN UR ELPH-MCNC: 3.5 GM/DL
GLUCOSE SERPL-MCNC: 117 MG/DL (ref 65–99)
HCT VFR BLD AUTO: 38.8 % (ref 34–46.6)
HGB BLD-MCNC: 13 G/DL (ref 12–15.9)
IMM GRANULOCYTES # BLD AUTO: 0.08 10*3/MM3 (ref 0–0.05)
IMM GRANULOCYTES NFR BLD AUTO: 0.6 % (ref 0–0.5)
LYMPHOCYTES # BLD AUTO: 2.56 10*3/MM3 (ref 0.7–3.1)
LYMPHOCYTES NFR BLD AUTO: 20.1 % (ref 19.6–45.3)
MCH RBC QN AUTO: 31.3 PG (ref 26.6–33)
MCHC RBC AUTO-ENTMCNC: 33.5 G/DL (ref 31.5–35.7)
MCV RBC AUTO: 93.5 FL (ref 79–97)
MONOCYTES # BLD AUTO: 0.83 10*3/MM3 (ref 0.1–0.9)
MONOCYTES NFR BLD AUTO: 6.5 % (ref 5–12)
NEUTROPHILS NFR BLD AUTO: 69.4 % (ref 42.7–76)
NEUTROPHILS NFR BLD AUTO: 8.83 10*3/MM3 (ref 1.7–7)
NRBC BLD AUTO-RTO: 0 /100 WBC (ref 0–0.2)
PLATELET # BLD AUTO: 250 10*3/MM3 (ref 140–450)
PMV BLD AUTO: 11.1 FL (ref 6–12)
POTASSIUM SERPL-SCNC: 3.7 MMOL/L (ref 3.5–5.2)
PROT SERPL-MCNC: 7.8 G/DL (ref 6–8.5)
RBC # BLD AUTO: 4.15 10*6/MM3 (ref 3.77–5.28)
SODIUM SERPL-SCNC: 138 MMOL/L (ref 136–145)
WBC # BLD AUTO: 12.74 10*3/MM3 (ref 3.4–10.8)

## 2021-03-20 PROCEDURE — 96372 THER/PROPH/DIAG INJ SC/IM: CPT

## 2021-03-20 PROCEDURE — 36415 COLL VENOUS BLD VENIPUNCTURE: CPT

## 2021-03-20 PROCEDURE — 99282 EMERGENCY DEPT VISIT SF MDM: CPT

## 2021-03-20 PROCEDURE — 85651 RBC SED RATE NONAUTOMATED: CPT | Performed by: NURSE PRACTITIONER

## 2021-03-20 PROCEDURE — 86140 C-REACTIVE PROTEIN: CPT | Performed by: NURSE PRACTITIONER

## 2021-03-20 PROCEDURE — 25010000002 METHYLPREDNISOLONE PER 125 MG: Performed by: NURSE PRACTITIONER

## 2021-03-20 PROCEDURE — 85025 COMPLETE CBC W/AUTO DIFF WBC: CPT | Performed by: NURSE PRACTITIONER

## 2021-03-20 PROCEDURE — 80053 COMPREHEN METABOLIC PANEL: CPT | Performed by: NURSE PRACTITIONER

## 2021-03-20 RX ORDER — MULTIPLE VITAMINS W/ MINERALS TAB 9MG-400MCG
1 TAB ORAL DAILY
COMMUNITY

## 2021-03-20 RX ORDER — DEXTRIN 3 G/3.5 G
POWDER (GRAM) ORAL
COMMUNITY

## 2021-03-20 RX ORDER — ACETAMINOPHEN 500 MG
500 TABLET ORAL EVERY 6 HOURS PRN
COMMUNITY
End: 2022-11-04

## 2021-03-20 RX ORDER — METHYLPREDNISOLONE SODIUM SUCCINATE 125 MG/2ML
125 INJECTION, POWDER, LYOPHILIZED, FOR SOLUTION INTRAMUSCULAR; INTRAVENOUS ONCE
Status: COMPLETED | OUTPATIENT
Start: 2021-03-20 | End: 2021-03-20

## 2021-03-20 RX ORDER — SIMVASTATIN 20 MG
30 TABLET ORAL NIGHTLY
COMMUNITY

## 2021-03-20 RX ORDER — LEVOTHYROXINE SODIUM 0.03 MG/1
25 TABLET ORAL DAILY
COMMUNITY

## 2021-03-20 RX ORDER — MELOXICAM 15 MG/1
15 TABLET ORAL DAILY
COMMUNITY

## 2021-03-20 RX ORDER — HYDROCHLOROTHIAZIDE 25 MG/1
25 TABLET ORAL DAILY
COMMUNITY
End: 2023-03-30

## 2021-03-20 RX ORDER — SODIUM PHOSPHATE,MONO-DIBASIC 19G-7G/118
1 ENEMA (ML) RECTAL 2 TIMES DAILY WITH MEALS
COMMUNITY

## 2021-03-20 RX ORDER — ASPIRIN 81 MG/1
81 TABLET ORAL DAILY
COMMUNITY

## 2021-03-20 RX ORDER — PREDNISONE 20 MG/1
20 TABLET ORAL DAILY
Qty: 4 TABLET | Refills: 0 | Status: SHIPPED | OUTPATIENT
Start: 2021-03-20 | End: 2021-03-24

## 2021-03-20 RX ADMIN — METHYLPREDNISOLONE SODIUM SUCCINATE 125 MG: 125 INJECTION, POWDER, FOR SOLUTION INTRAMUSCULAR; INTRAVENOUS at 19:59

## 2021-03-20 NOTE — TELEPHONE ENCOUNTER
"Caller is asking if we have a neurologist on call at our ED today.  Advised that we do.  She states her PCP, Dr. Lynn     Reason for Disposition  • General information question, no triage required and triager able to answer question    Additional Information  • Negative: [1] Caller is not with the adult (patient) AND [2] reporting urgent symptoms  • Negative: Lab result questions  • Negative: Medication questions  • Negative: Caller can't be reached by phone  • Negative: Caller has already spoken to PCP or another triager  • Negative: RN needs further essential information from caller in order to complete triage  • Negative: Requesting regular office appointment  • Negative: [1] Caller requesting NON-URGENT health information AND [2] PCP's office is the best resource  • Negative: Health Information question, no triage required and triager able to answer question    Answer Assessment - Initial Assessment Questions  1. REASON FOR CALL or QUESTION: \"What is your reason for calling today?\" or \"How can I best help you?\" or \"What question do you have that I can help answer?\"      She states her physician texted her to come to the ED at [facility] and see a neurologist.  She states she had rheumatoid arthritis.    Protocols used: INFORMATION ONLY CALL - NO TRIAGE-ADULT-      "

## 2021-03-21 NOTE — ED PROVIDER NOTES
Subjective   Patient is a 56-year-old female presents emergency department with chief complaints of joint pain.  Patient states that she began developing pain last Friday in particular to her knees bilaterally, the tops of her feet and ankles, her thumbs bilaterally, and wrists bilaterally.  She states that she was evaluated at [facility] last Saturday and diagnosed with edema.  She was instructed to double her Lasix for 3 days and indicates that she was urinating profusely during that time.  She states that it did help with some of the swelling that she was experiencing however not with her joint pain.  She tells me since she was prescribed lasix she has had times of loss of bladder control that only began once prescribed lasix. She has no back pain or saddle anesthesia. This occurrence is intermittent in nature. Patient was evaluated by her primary care physician, Dr. Lynn, on Tuesday who ordered lab work regarding multiple joint pain.  Patient states that she received a text message on Friday and was told that she tested positive for rheumatoid factor and was instructed to come to the ER with continued pain and symptoms.  Patient has had no fever.  She has had no redness to her painful joints.  She denies any other associated symptoms or modifying factors.          Review of Systems   Constitutional: Negative.  Negative for fever.   HENT: Negative.  Negative for congestion.    Eyes: Negative.    Respiratory: Negative.  Negative for cough and shortness of breath.    Cardiovascular: Negative.  Negative for chest pain.   Gastrointestinal: Negative.  Negative for abdominal pain, diarrhea, nausea and vomiting.   Genitourinary: Negative.    Musculoskeletal: Negative for joint swelling.        Reports painful joints   Skin: Negative.  Negative for rash.       Past Medical History:   Diagnosis Date   • Disease of thyroid gland    • GERD (gastroesophageal reflux disease)    • Hyperlipidemia    •  Narcolepsy    • Rheumatoid arthritis (CMS/HCC)        Allergies   Allergen Reactions   • Contrast Dye Anaphylaxis   • Sulfa Antibiotics Rash       Past Surgical History:   Procedure Laterality Date   •  SECTION     • CHOLECYSTECTOMY     • COLONOSCOPY     • COLONOSCOPY N/A 2018    Procedure: COLONOSCOPY WITH ANESTHESIA;  Surgeon: Mary Segura MD;  Location: [facility] ENDOSCOPY;  Service: Gastroenterology   • HYSTERECTOMY     • OOPHORECTOMY         Family History   Problem Relation Age of Onset   • Arthritis Mother    • Colon polyps Mother    • Arthritis Father    • No Known Problems Sister    • Narcolepsy Daughter    • Depression Maternal Grandmother    • Depression Paternal Grandfather        Social History     Socioeconomic History   • Marital status:      Spouse name: Not on file   • Number of children: Not on file   • Years of education: Not on file   • Highest education level: Not on file   Tobacco Use   • Smoking status: Former Smoker   • Smokeless tobacco: Never Used   • Tobacco comment: Quit in    Substance and Sexual Activity   • Alcohol use: No   • Drug use: No   • Sexual activity: Defer           Objective   Physical Exam  Vitals and nursing note reviewed.   Constitutional:       Appearance: She is well-developed.   HENT:      Head: Normocephalic and atraumatic.      Right Ear: External ear normal.      Left Ear: External ear normal.      Nose: Nose normal.      Mouth/Throat:      Mouth: Mucous membranes are moist.      Pharynx: Oropharynx is clear.   Eyes:      Conjunctiva/sclera: Conjunctivae normal.      Pupils: Pupils are equal, round, and reactive to light.   Cardiovascular:      Rate and Rhythm: Normal rate and regular rhythm.      Heart sounds: Normal heart sounds.   Pulmonary:      Effort: Pulmonary effort is normal.      Breath sounds: Normal breath sounds.   Abdominal:      General: Bowel sounds are normal.      Palpations: Abdomen is soft.   Musculoskeletal:          General: Tenderness present. Normal range of motion.      Cervical back: Normal range of motion and neck supple.      Comments: Patient reports tenderness bilaterally to her knees bilaterally, ankles bilaterally, dorsums of her feet bilaterally, thumbs bilaterally, and wrist bilaterally without any gross amount of swelling or erythema identified to these extremities   Skin:     General: Skin is warm and dry.      Capillary Refill: Capillary refill takes less than 2 seconds.   Neurological:      General: No focal deficit present.      Mental Status: She is alert and oriented to person, place, and time.   Psychiatric:         Mood and Affect: Mood normal.         Behavior: Behavior normal.         Thought Content: Thought content normal.         Judgment: Judgment normal.         Procedures           ED Course  ED Course as of Mar 21 0807   Sat Mar 20, 2021   1944 Work-up reveals CRP is 2.57, white count is 12.74, CMP is unremarkable, sed rate is 17.  Patient tells me she has had intermittent urinary incontinence since January when she was initially prescribed Lasix.  She states symptoms worsened recently when Lasix was doubled. However again symptoms come and go only since prescribed lasix. We recommend follow up with pcp for possible referral to rheumatology.    [TW]      ED Course User Index  [TW] Adrienne Jackson, IVAN                                           MDM  Number of Diagnoses or Management Options  Arthralgia, unspecified joint: new and requires workup     Amount and/or Complexity of Data Reviewed  Clinical lab tests: ordered and reviewed    Risk of Complications, Morbidity, and/or Mortality  Presenting problems: low  Diagnostic procedures: low  Management options: low    Patient Progress  Patient progress: improved      Final diagnoses:   Arthralgia, unspecified joint       ED Disposition  ED Disposition     ED Disposition Condition Comment    Discharge Good           No follow-up provider  specified.       Medication List      New Prescriptions    predniSONE 20 MG tablet  Commonly known as: DELTASONE  Take 1 tablet by mouth Daily for 4 days.           Where to Get Your Medications      These medications were sent to [facility] - [place], [place] - [address] - [phone]  - [phone] [address] [address]    Phone: [phone]   · predniSONE 20 MG tablet          Adrienne Jackson, APRN  03/21/21 0808

## 2022-02-10 ENCOUNTER — OFFICE VISIT (OUTPATIENT)
Dept: SURGERY | Age: 57
End: 2022-02-10
Payer: COMMERCIAL

## 2022-02-10 VITALS
TEMPERATURE: 98.4 F | HEIGHT: 64 IN | WEIGHT: 243.8 LBS | OXYGEN SATURATION: 98 % | HEART RATE: 84 BPM | BODY MASS INDEX: 41.62 KG/M2

## 2022-02-10 DIAGNOSIS — N63.10 BREAST MASS, RIGHT: Primary | ICD-10-CM

## 2022-02-10 DIAGNOSIS — R92.8 ABNORMAL MAMMOGRAM OF RIGHT BREAST: ICD-10-CM

## 2022-02-10 PROCEDURE — 99204 OFFICE O/P NEW MOD 45 MIN: CPT | Performed by: SURGERY

## 2022-02-10 RX ORDER — MELOXICAM 15 MG/1
15 TABLET ORAL DAILY
COMMUNITY

## 2022-02-10 RX ORDER — OMEPRAZOLE 20 MG/1
20 CAPSULE, DELAYED RELEASE ORAL DAILY
COMMUNITY
Start: 2021-11-05

## 2022-02-10 RX ORDER — LEVOTHYROXINE SODIUM 200 UG/1
200 TABLET ORAL DAILY
COMMUNITY
Start: 2021-12-29

## 2022-02-10 RX ORDER — METHOTREXATE 2.5 MG/1
5 TABLET ORAL WEEKLY
COMMUNITY
Start: 2022-01-30

## 2022-02-10 RX ORDER — LEVOTHYROXINE SODIUM 0.03 MG/1
25 TABLET ORAL DAILY
COMMUNITY
Start: 2021-12-29

## 2022-02-10 RX ORDER — MULTIPLE VITAMINS W/ MINERALS TAB 9MG-400MCG
1 TAB ORAL DAILY
COMMUNITY

## 2022-02-10 RX ORDER — DEXTRIN 3 G/3.5 G
1 POWDER (GRAM) ORAL EVERY OTHER DAY
COMMUNITY

## 2022-02-10 RX ORDER — ZINC GLUCONATE 50 MG
50 TABLET ORAL DAILY
COMMUNITY

## 2022-02-10 RX ORDER — FOLIC ACID 1 MG/1
1 TABLET ORAL DAILY
COMMUNITY
Start: 2022-01-30

## 2022-02-10 RX ORDER — ASPIRIN 81 MG/1
81 TABLET ORAL DAILY
COMMUNITY

## 2022-02-10 ASSESSMENT — ENCOUNTER SYMPTOMS
EYE REDNESS: 0
ABDOMINAL PAIN: 0
COUGH: 0
SORE THROAT: 0
VOMITING: 0
COLOR CHANGE: 0
CONSTIPATION: 0
BACK PAIN: 0
DIARRHEA: 0
NAUSEA: 0
ABDOMINAL DISTENTION: 0
SHORTNESS OF BREATH: 0
EYE PAIN: 0
CHEST TIGHTNESS: 0

## 2022-02-10 NOTE — PROGRESS NOTES
SUBJECTIVE:  Ms. Alessio Ching is a 49 y.o. female who presents today with a palpable knot to her right breast. She states that she first noticed it around [place] time. She states she has not had a mammogram in a few years due to [place], as \"someone\" told her she didn't need them. She states at times the area hurts, but it is a difficult pain to describe. She states it is a heavy sensation. She has never had anything like this in the past.  She says when she stands or sits up, it causes a divot to her right breast.    She started her menses at 7years old. She is currently Is post menopausal since 52years of age. She has had 3 pregnancies with first live birth at 24years of age. She does have a history of breast cancer in her family in a maternal aunt. She is unsure of her age or type. She does not smoke any longer, she quite in 2007.       Past Medical History:   Diagnosis Date    GERD (gastroesophageal reflux disease)     Hypothyroidism      Past Surgical History:   Procedure Laterality Date    CHOLECYSTECTOMY      DILATION AND CURETTAGE OF UTERUS      HYSTERECTOMY, TOTAL ABDOMINAL       Current Outpatient Medications   Medication Sig Dispense Refill    zinc gluconate 50 MG tablet Take 50 mg by mouth daily      aspirin 81 MG EC tablet Take 81 mg by mouth daily      meloxicam (MOBIC) 15 MG tablet Take 15 mg by mouth daily      methotrexate (RHEUMATREX) 2.5 MG chemo tablet TAKE 8 TABLETS BY MOUTH ONCE A WEEK      levothyroxine (SYNTHROID) 25 MCG tablet TAKE 1 TABLET BY MOUTH ONCE DAILY      EUTHYROX 200 MCG tablet TAKE 1 TABLET BY MOUTH ONCE DAILY      metFORMIN (GLUCOPHAGE) 500 MG tablet TAKE 1 TABLET BY MOUTH TWICE DAILY      Fiber POWD Take by mouth      omeprazole (PRILOSEC) 20 MG delayed release capsule       folic acid (FOLVITE) 1 MG tablet TAKE 1 TABLET BY MOUTH ONCE DAILY FOR 30 DAYS      Multiple Vitamins-Minerals (THERA M PLUS) TABS Take 1 tablet by mouth daily      polyethyl glycol-propyl glycol 0.4-0.3 % (SYSTANE) 0.4-0.3 % ophthalmic solution Every 1 (One) Hour As Needed. No current facility-administered medications for this visit. Allergies: Contrast [barium-containing compounds] and Sulfa antibiotics    Family History   Problem Relation Age of Onset    Breast Cancer Maternal Aunt        Social History     Tobacco Use    Smoking status: Former Smoker    Smokeless tobacco: Never Used   Substance Use Topics    Alcohol use: Not Currently       Review of Systems   Constitutional: Negative for fatigue, fever and unexpected weight change. HENT: Negative for hearing loss, nosebleeds and sore throat. Eyes: Negative for pain, redness and visual disturbance. Respiratory: Negative for cough, chest tightness and shortness of breath. Cardiovascular: Negative for chest pain, palpitations and leg swelling. Gastrointestinal: Negative for abdominal distention, abdominal pain, constipation, diarrhea, nausea and vomiting. Endocrine: Negative for cold intolerance, heat intolerance and polydipsia. Genitourinary: Negative for difficulty urinating, frequency and urgency. Musculoskeletal: Positive for arthralgias and myalgias. Negative for back pain, joint swelling and neck pain. Skin: Negative for color change, rash and wound. Allergic/Immunologic: Negative for environmental allergies and food allergies. Neurological: Negative for seizures, light-headedness and headaches. Hematological: Negative for adenopathy. Does not bruise/bleed easily. Psychiatric/Behavioral: Negative for confusion, sleep disturbance and suicidal ideas. OBJECTIVE:  Pulse 84   Temp 98.4 °F (36.9 °C) (Temporal)   Ht 5' 4\" (1.626 m)   Wt 243 lb 12.8 oz (110.6 kg)   SpO2 98%   BMI 41.85 kg/m²   Physical Exam  Vitals reviewed. Exam conducted with a chaperone present. Constitutional:       Appearance: She is well-developed. HENT:      Head: Normocephalic and atraumatic.    Eyes: Pupils: Pupils are equal, round, and reactive to light. Cardiovascular:      Rate and Rhythm: Normal rate and regular rhythm. Pulmonary:      Effort: Pulmonary effort is normal.      Breath sounds: Normal breath sounds. No wheezing or rales. Chest:   Breasts:      Right: Mass (firm immobile mass to the 9-10:00 region of the right breast. tender.) present. No inverted nipple, nipple discharge, skin change, tenderness, axillary adenopathy (tender to palpation.) or supraclavicular adenopathy. Left: No inverted nipple, mass, nipple discharge, skin change, tenderness, axillary adenopathy or supraclavicular adenopathy. Comments: Seated: skin dimpling to right outer breast;   Abdominal:      General: Bowel sounds are normal. There is no distension. Palpations: Abdomen is soft. Musculoskeletal:         General: Normal range of motion. Cervical back: Normal range of motion and neck supple. Lymphadenopathy:      Cervical: No cervical adenopathy. Upper Body:      Right upper body: No supraclavicular or axillary (tender to palpation.) adenopathy. Left upper body: No supraclavicular or axillary adenopathy. Skin:     General: Skin is warm and dry. Neurological:      Mental Status: She is alert and oriented to person, place, and time. Psychiatric:         Behavior: Behavior normal.         Thought Content: Thought content normal.         Judgment: Judgment normal.       [facility] [place] Upper Outer Breast 2.5*2.2*3.3 cm mass concerning for malignancy. BIRAD 5.    ASSESSMENT:   Diagnosis Orders   1. Breast mass, right  US BREAST BIOPSY W LOC DEVICE 1ST LESION RIGHT   2. Abnormal mammogram of right breast  US BREAST BIOPSY W LOC DEVICE 1ST LESION RIGHT       PLAN:  Orders Placed This Encounter   Procedures    US BREAST BIOPSY W LOC DEVICE 1ST LESION RIGHT     Standing Status:   Future     Standing Expiration Date:   2/10/2023     Discussed biopsy and why it is indicated. Instructed patient to bring her disk and she notes understanding. She recently had covid, so no repeat test is indicated. Discussed likelihood of malignancy diagnosis and plan for MRI and oncology appointments to follow. She notes understanding. Will contact her via phone to discuss pathology. Return in about 2 weeks (around 2/24/2022).     DO Franklin 5/53/9315 9:46 AM

## 2022-02-14 ENCOUNTER — TELEPHONE (OUTPATIENT)
Dept: SURGERY | Age: 57
End: 2022-02-14

## 2022-02-17 ENCOUNTER — HOSPITAL ENCOUNTER (OUTPATIENT)
Dept: WOMENS IMAGING | Age: 57
Discharge: HOME | End: 2022-02-17
Payer: COMMERCIAL

## 2022-02-17 DIAGNOSIS — N63.10 BREAST MASS, RIGHT: ICD-10-CM

## 2022-02-17 DIAGNOSIS — R92.8 ABNORMAL MAMMOGRAM OF RIGHT BREAST: ICD-10-CM

## 2022-02-17 PROCEDURE — 88305 TISSUE EXAM BY PATHOLOGIST: CPT

## 2022-02-17 PROCEDURE — 77065 DX MAMMO INCL CAD UNI: CPT

## 2022-02-17 PROCEDURE — A4648 IMPLANTABLE TISSUE MARKER: HCPCS

## 2022-02-17 PROCEDURE — 88342 IMHCHEM/IMCYTCHM 1ST ANTB: CPT

## 2022-02-21 DIAGNOSIS — C50.919 INFILTRATING LOBULAR CARCINOMA OF BREAST IN FEMALE (HCC): Primary | ICD-10-CM

## 2022-02-21 NOTE — PROGRESS NOTES
Called patient and reviewed pathology results. She notes understanding. Will order mRI and oncology evaluation. Breast, right breast needle core biopsies at 10 o'clock position:   1.  Infiltrating lobular carcinoma, favor grade 2.   2.  Invasive carcinoma measures 0.9 cm in greatest linear dimension and   is present in multiple cores. Orders Placed This Encounter   Procedures    MRI BREAST BILATERAL W WO CONTRAST     Standing Status:   Future     Standing Expiration Date:   2/21/2023     Order Specific Question:   STAT Creatinine as needed:     Answer: Yes   Kathie Nava MD, Hematology/Oncology, [place]     Referral Priority:   Routine     Referral Type:   Eval and Treat     Referral Reason:   Specialty Services Required     Referred to Provider:    Sky Wheatley MD     Requested Specialty:   Oncology     Number of Visits Requested:   [id number] [place], [place]   4/78/54   4:24 PM

## 2022-02-22 ENCOUNTER — TELEPHONE (OUTPATIENT)
Dept: OTHER | Age: 57
End: 2022-02-22

## 2022-02-22 NOTE — TELEPHONE ENCOUNTER
Reached out to patient via telephone call to offer Nurse Navigator services. We spoke at length about navigator services and I offered to mail a Breast Cancer Treatment Handbook to her and she agreed that would be good. I encouraged her to reach out at anytime with questions or concerns. Reviewed appointments and locations with patient. Pt states her family had questions about bone scans. Informed patient that once Dr. Grace Gaytan had results from MRI, further orders would be given if needed. Pt verbalized understanding. Will f/u after appt with Dr. Kalli Cintron and Dr. Grace Gaytan.

## 2022-02-23 ENCOUNTER — TELEPHONE (OUTPATIENT)
Dept: OTHER | Age: 57
End: 2022-02-23

## 2022-02-24 NOTE — PROGRESS NOTES
MEDICAL ONCOLOGY CONSULTATION    Pt Name: Loan Degroot  MRN: [id number]  YOB: 1965  Date of evaluation: 3/3/2022    REASON FOR CONSULTATION:  Breast cancer  REQUESTING PHYSICIAN: Dr Yesenia Kohli    History Obtained From:  patient and old medical records    HISTORY OF PRESENT ILLNESS:    Diagnosis  · Infiltrating lobular carcinoma, right breast, Feb 2022  · Favor grade 2  · ER 65%, CT 65%, HER-2 negative, Ki67 8%  · [id number]    Treatment Summary  · 3/3/22 Initiate endocrine hormone therapy with Letrozole 2.5mg daily   · Anticipate surgery  · Anticipate chemotherapy  · Anticipate radiation therapy    Cancer History  Shaun Lozada was first seen by me on 3/3/2022. The patient was referred by Dr. Yesenia Kohli for diagnosis of breast cancer. The patient noticed an abnormality in the skin of the right breast.  She missed mammogram in 2021 due to COVID-19. She had mammogram in 2020. · 12/9/21 Bilateral mammogram [facility]): The breast tissue is heterogeneously dense. This may lower the sensitivity of mammography. There is heterogeneous increased density with distortion suggested in the upper outer right breast 9-10 o'clock. On the right MLO slab 8/18 there is a possible 7mm nodule toward 9-10 o'clock deep in the breast. No new abnormal density is noted in the left breast. There are no suspicious appearing clustered microcalcifications or skin thickening. · 12/9/21 US breast [facility]): No lesion noted in the left breast. In the right breast there is a heterogeneous process suggesting a mass at 10 o'clock. This measures 2.5 x 2.2 x 3.3cm. There is shadowing from this process. There is blood flow suggested in the process. The possible small nodule noted in the right breast on the MLO projection, on the mammogram is not identified on ultrasound. · 2/17/22 Breast, right breast needle core biopsies at 10 o'clock position:  Infiltrating lobular carcinoma, favor grade 2.  Invasive carcinoma measures 0.9 cm in greatest linear dimension and is present in multiple cores. ER 65%, SC 65%, HER-2 negative, Ki67 8%. · 3/3/22 MRI BREAST BILATERAL W WO CONTRAST: In the right breast around 10:00 in the mid depth region, there is residual enhancing mass. There is a marker clip in this area related to prior biopsy. The area of enhancement measures 4.3 x 1.7 x 2.8 cm. There is medium to rapid initial enhancement. The delayed enhancement demonstrates a mixture of type I and type II kinetics. There is a separate nodule in the right breast located close to the biopsied lesion. This measures about 1 x 0.7 x 1.2 cm and is located at 12-1:00 in the mid depth region. There is similar enhancement of this nodule with type I--type 2 kinetics. LEFT BREAST: There is a small nodule measuring 6 mm located anteriorly and fairly close to the nipple at the 9:00 location demonstrating persistent type I kinetics. The nodule is smoothly marginated. LYMPH NODES: There are no suspicious appearing lymph nodes in either axilla. .  No suspicious-appearing lymph nodes. ADDENDUM: The patient has an additional skin lesion on the medial side of the left breast around the 10:00 o'clock location. This measures about 1.3 x 1.3 cm and enhances. Direct examination of the skin lesion is recommended. The margins of the enhancement are somewhat ill-defined. · 3/3/22 Initiate endocrine hormone therapy with Letrozole 2.5mg daily. Recommended to proceed with surgery.       Past Medical History:    Past Medical History:   Diagnosis Date    GERD (gastroesophageal reflux disease)     Hypothyroidism    · Breast cancer, right breast    Past Surgical History:    Past Surgical History:   Procedure Laterality Date     SECTION      CHOLECYSTECTOMY      DILATION AND CURETTAGE OF UTERUS      DILATION AND CURETTAGE OF UTERUS  1991    HYSTERECTOMY, TOTAL ABDOMINAL      US BREAST NEEDLE BIOPSY RIGHT Right 2022    US BREAST NEEDLE BIOPSY RIGHT 2022 [facility] [address] Social History:    Marital status:  Smoking status:Former; Quit 2007  ETOH status:Former; Quit 1993  Resides: [facility]    Family History:   Family History   Problem Relation Age of Onset    Cancer Mother         Throat and rectal    Breast Cancer Maternal Aunt     Cancer Maternal Aunt         Pancreatic       Current Hospital Medications:    Current Outpatient Medications   Medication Sig Dispense Refill    letrozole (FEMARA) 2.5 MG tablet Take 1 tablet by mouth daily 30 tablet 3    oxybutynin (DITROPAN) 5 MG tablet Take 1 tablet by mouth 2 times daily 60 tablet 5    aspirin 81 MG EC tablet Take 81 mg by mouth daily      meloxicam (MOBIC) 15 MG tablet Take 15 mg by mouth daily      methotrexate (RHEUMATREX) 2.5 MG chemo tablet TAKE 8 TABLETS BY MOUTH ONCE A WEEK      levothyroxine (SYNTHROID) 25 MCG tablet TAKE 1 TABLET BY MOUTH ONCE DAILY      EUTHYROX 200 MCG tablet TAKE 1 TABLET BY MOUTH ONCE DAILY      metFORMIN (GLUCOPHAGE) 500 MG tablet TAKE 1 TABLET BY MOUTH TWICE DAILY      Fiber POWD Take by mouth      omeprazole (PRILOSEC) 20 MG delayed release capsule       folic acid (FOLVITE) 1 MG tablet TAKE 1 TABLET BY MOUTH ONCE DAILY FOR 30 DAYS      Multiple Vitamins-Minerals (THERA M PLUS) TABS Take 1 tablet by mouth daily      polyethyl glycol-propyl glycol 0.4-0.3 % (SYSTANE) 0.4-0.3 % ophthalmic solution Every 1 (One) Hour As Needed.  zinc gluconate 50 MG tablet Take 50 mg by mouth daily       No current facility-administered medications for this visit. Allergies:    Allergies   Allergen Reactions    Barium-Containing Compounds Shortness Of Breath and Anaphylaxis    Sulfa Antibiotics Itching and Rash         Subjective   REVIEW OF SYSTEMS:   CONSTITUTIONAL: no fever, no night sweats, weight gain, fatigue;  HEENT: no blurring of vision, no double vision, no hearing difficulty, no tinnitus, no ulceration, no dysplasia, no epistaxis;  LUNGS: no cough, no hemoptysis, wheeze, shortness of breath;  CARDIOVASCULAR: no palpitation, no chest pain,  shortness of breath;  GI: heartburn, no abdominal pain, no nausea, no vomiting,  diarrhea, no constipation;  MARINA:bladder spasm, no dysuria, no hematuria, no frequency or urgency, no nephrolithiasis;  MUSCULOSKELETAL:shouderblade pain, joint pain, LE swelling, no stiffness;  ENDOCRINE: no polyuria, no polydipsia, no cold or heat intolerance, hot flashes;  HEMATOLOGY: no easy bruising or bleeding, no history of clotting disorder;  DERMATOLOGY: skin nodules,no skin rash, no eczema, no pruritus;  PSYCHIATRY:  depression, anxiety, no panic attacks, no suicidal ideation, no homicidal ideation;  NEUROLOGY:headaches, no syncope, no seizures, no numbness or tingling of hands, no numbness or tingling of feet, no paresis;    Objective   BP (!) 162/82   Pulse 84   Ht 5' 4\" (1.626 m)   Wt 238 lb 6.4 oz (108.1 kg)   SpO2 98%   BMI 40.92 kg/m²     PHYSICAL EXAM:  CONSTITUTIONAL: Alert, appropriate, no acute distress  EYES: Non icteric, EOM intact, pupils equal round   ENT: Mucus membranes moist, no oral pharyngeal lesions, external inspection of ears and nose are normal  NECK: Supple, no masses. No palpable thyroid mass  CHEST/LUNGS: CTA bilaterally, normal respiratory effort   BREAST: Chaperoned breast exam with Goldie Todd RN  CARDIOVASCULAR: RRR, no murmurs. No lower extremity edema  ABDOMEN: soft non-tender, active bowel sounds, no HSM. No palpable masses  EXTREMITIES: warm, full ROM in all 4 extremities, no focal weakness. SKIN: warm, dry with no rashes or lesions  LYMPH: No cervical, clavicular, axillary, or inguinal lymphadenopathy  NEUROLOGIC: follows commands, non focal   PSYCH: mood and affect appropriate.   Alert and oriented to time, place, person      LABORATORY RESULTS REVIEWED/ANALYZED BY ME:  3/3/2022 CBC  WBC 8.6  HGB 13.7    Neut 67.6    RADIOLOGY STUDIES REVIEWED BY ME:  As above      ASSESSMENT:    No orders of the defined types were placed in this encounter. Adam Pugh was seen today for new patient. Diagnoses and all orders for this visit:    Infiltrating lobular carcinoma of breast in female ([facility] [address]  -     letrozole ([address] [facility]) 2.5 MG tablet; Take 1 tablet by mouth daily    Care plan discussed with patient    Carcinoma of right breast, estrogen and progesterone receptor positive ([facility] [address]  -     letrozole (FEMARA) 2.5 MG tablet; Take 1 tablet by mouth daily    Hot flashes due to menopause  -     oxybutynin (DITROPAN) 5 MG tablet; Take 1 tablet by mouth 2 times daily      Right breast ILC ER 65%, IL 65%, HER-2 negative, Ki67 8%, [id number]  The patient was counseled today about diagnosis, staging, prognosis, diagnostic tests, medications, side effects and disease management. Essentially, palpable right breast mass consistent with invasive lobular carcinoma. There is a large mass on the MRI that centrally located. There is another small 1 cm adjacent nodule which is also concerning. No evidence of axillary adenopathy. Discussed with Dr. Dahlia Andrade. The patient will proceed with upfront surgery. Oncotype DX score will be performed on the specimen.  -Started on letrozole  -Start on oxybutynin for hot flashes     Postmenopausal status-started on letrozole  -Status post HTA/SOB    Strong family history of breast cancer-referred for genetic test    Hypertension-follow-up with primary care physician    PLAN:  · RTC with MD 7 weeks  · Recommend Letrozole 2.5mg daily-script sent  · Recommend Oxybutynin 5mg twice a day-script sent  · Recommend genetic testing with Orly Garcia PA-C or Dilan Lamas RN  · Continue follow-up with Dr. Dahlia Andrade for surgery  · Discussed with Dr. Valentino Madden, surgery 4/8/2022      Maria BACH am pre-charting as a registered nurse for Deandra Lee MD. Electronically signed by Maria Smith RN on 3/3/2022 at 3:16 PM CST.     Maria BACH am scribing for Deandra Lee MD. Electronically signed by Wesley Palmer RN on 3/3/2022 at 10:04 AM CST. I, Dr Leonardo Stacy, personally performed the services described in this documentation as scribed by Wesley Palmer RN in my presence and is both accurate and complete. I have seen, examined and reviewed this patient medication list, appropriate labs and imaging studies. I reviewed relevant medical records and others physicians notes. I discussed the plans of care with the patient. I answered all the questions to the patients satisfaction. I have also reviewed the chief complaint (CC) and part of the history (History of Present Illness (HPI), Past Family Social History [facility]), or Review of Systems (ROS) and made changes when appropriated. (Please note that portions of this note were completed with a voice recognition program. Efforts were made to edit the dictations but occasionally words are mis-transcribed.)    Electronically signed by Jeancarlos Rosa MD on 3/3/2022 at 10:18 AM          The total time (60min) I spent to see the patient today includes at least one or more of the following: preparing to see the patient by reviewing prior tests, prior notes or other relevant information, performing appropriate independent examination and evaluation, counseling, ordering of medications, tests or procedures, referrals, communicating with other healthcare professionals when appropriated to coordinate care, documenting clinic information in the electronic medical record or other health records, independently interpreting results of tests, managing test results and communicating the results to the patient/family or caregiver.

## 2022-02-28 DIAGNOSIS — C50.919 INFILTRATING LOBULAR CARCINOMA OF BREAST IN FEMALE (HCC): Primary | ICD-10-CM

## 2022-03-01 ENCOUNTER — HOSPITAL ENCOUNTER (OUTPATIENT)
Dept: MRI IMAGING | Age: 57
Discharge: HOME | End: 2022-03-01
Payer: COMMERCIAL

## 2022-03-01 DIAGNOSIS — C50.919 INFILTRATING LOBULAR CARCINOMA OF BREAST IN FEMALE (HCC): ICD-10-CM

## 2022-03-01 PROCEDURE — A9577 INJ MULTIHANCE: HCPCS | Performed by: SURGERY

## 2022-03-01 PROCEDURE — 77049 MRI BREAST C-+ W/CAD BI: CPT

## 2022-03-01 PROCEDURE — 6360000004 HC RX CONTRAST MEDICATION: Performed by: SURGERY

## 2022-03-01 RX ADMIN — GADOBENATE DIMEGLUMINE 19 ML: 529 INJECTION, SOLUTION INTRAVENOUS at 17:01

## 2022-03-03 ENCOUNTER — TELEPHONE (OUTPATIENT)
Dept: OTHER | Age: 57
End: 2022-03-03

## 2022-03-03 ENCOUNTER — HOSPITAL ENCOUNTER (OUTPATIENT)
Dept: INFUSION THERAPY | Age: 57
Discharge: HOME | End: 2022-03-03
Payer: COMMERCIAL

## 2022-03-03 ENCOUNTER — OFFICE VISIT (OUTPATIENT)
Dept: HEMATOLOGY | Age: 57
End: 2022-03-03
Payer: COMMERCIAL

## 2022-03-03 ENCOUNTER — INITIAL CONSULT (OUTPATIENT)
Dept: SURGERY | Age: 57
End: 2022-03-03
Payer: COMMERCIAL

## 2022-03-03 VITALS
WEIGHT: 238.4 LBS | OXYGEN SATURATION: 98 % | HEART RATE: 84 BPM | DIASTOLIC BLOOD PRESSURE: 82 MMHG | SYSTOLIC BLOOD PRESSURE: 162 MMHG | HEIGHT: 64 IN | BODY MASS INDEX: 40.7 KG/M2

## 2022-03-03 DIAGNOSIS — Z17.0 CARCINOMA OF RIGHT BREAST, ESTROGEN AND PROGESTERONE RECEPTOR POSITIVE (HCC): ICD-10-CM

## 2022-03-03 DIAGNOSIS — C50.919 INFILTRATING LOBULAR CARCINOMA OF BREAST IN FEMALE (HCC): Primary | ICD-10-CM

## 2022-03-03 DIAGNOSIS — C50.919 INFILTRATING LOBULAR CARCINOMA OF BREAST IN FEMALE (HCC): ICD-10-CM

## 2022-03-03 DIAGNOSIS — N95.1 HOT FLASHES DUE TO MENOPAUSE: ICD-10-CM

## 2022-03-03 DIAGNOSIS — Z71.89 CARE PLAN DISCUSSED WITH PATIENT: ICD-10-CM

## 2022-03-03 DIAGNOSIS — C50.911 CARCINOMA OF RIGHT BREAST, ESTROGEN AND PROGESTERONE RECEPTOR POSITIVE (HCC): ICD-10-CM

## 2022-03-03 DIAGNOSIS — N63.20 LEFT BREAST MASS: ICD-10-CM

## 2022-03-03 LAB
HCT VFR BLD CALC: 41.4 % (ref 34.1–44.9)
HEMOGLOBIN: 13.7 G/DL (ref 11.2–15.7)
MCH RBC QN AUTO: 33.4 PG (ref 25.6–32.2)
MCHC RBC AUTO-ENTMCNC: 33.1 G/DL (ref 32.3–35.5)
MCV RBC AUTO: 101 FL (ref 79.4–94.8)
PDW BLD-RTO: 13.4 % (ref 11.7–14.4)
PLATELET # BLD: 175 K/UL (ref 182–369)
PMV BLD AUTO: 11.3 FL (ref 7.4–10.4)
RBC # BLD: 4.1 M/UL (ref 3.93–5.22)
WBC # BLD: 8.6 K/UL (ref 3.98–10.04)

## 2022-03-03 PROCEDURE — 99215 OFFICE O/P EST HI 40 MIN: CPT | Performed by: SURGERY

## 2022-03-03 PROCEDURE — 99211 OFF/OP EST MAY X REQ PHY/QHP: CPT

## 2022-03-03 PROCEDURE — 85027 COMPLETE CBC AUTOMATED: CPT

## 2022-03-03 PROCEDURE — 99205 OFFICE O/P NEW HI 60 MIN: CPT | Performed by: INTERNAL MEDICINE

## 2022-03-03 RX ORDER — LETROZOLE 2.5 MG/1
2.5 TABLET, FILM COATED ORAL DAILY
Qty: 30 TABLET | Refills: 3 | Status: SHIPPED | OUTPATIENT
Start: 2022-03-03 | End: 2022-06-20

## 2022-03-03 RX ORDER — OXYBUTYNIN CHLORIDE 5 MG/1
5 TABLET ORAL 2 TIMES DAILY
Qty: 60 TABLET | Refills: 5 | Status: SHIPPED | OUTPATIENT
Start: 2022-03-03 | End: 2022-08-15

## 2022-03-03 NOTE — PROGRESS NOTES
options including simple mastectomy and lumpectomy with  sentinel lymph node biopsy as well as the possibility of axillary lymph node dissection. We explained in depth why breast conservation therapy requires radiation treatments for the majority of women. We discussed that given the location of her two tumors in her breast, I did not feel the cosmetic outcome and risk for positive margin would be beneficial, and mastectomy is recommended. She states understanding of this. I explained that most women treated for invasive malignancy do receive systemic therapy, hormonal therapy or  chemotherapy postoperatively depending upon the final pathology, the lymph node status, and the hormone receptor status. I discussed Oncotype Dx as a tool which aids in the decision for chemotherapy or hormonal therapy. We also discussed the possibility of breast reconstruction if a mastectomy was required. .  I explained to her the different techniques including surgical reconstruction vs prosthesis and she does not wish to pursue surgical reconstruction at this time. After a prolonged discussion lasting 30 minutes  we felt it was most appropriate that she undergo right simple mastectomy and and sentinel node biopsy. Will send her final specimen for Oncotype DX. Orders Placed This Encounter   Procedures    US BREAST LIMITED LEFT         We discussed the risks and benefits of the surgery including but not limited to bleeding, infection, pain, lymphedema, numbness, and also the risks of general anesthesia including pneumonia, blood clots, heart attack, stroke, and death. She expresses good understanding and is agreeable to proceed with surgery. We will schedule this to be done following her genetic testing results.     DO Franklin  8/8/51  3:50 PM

## 2022-03-03 NOTE — LETTER
SCHEDULING INSTRUCTIONS:     Patient: Gissell Lee  :  1965    Hospital: Hospital    Admitting Physician:  Dr. Chyrl Holter    Diagnosis: Right Breast Cancer    Procedure:   Right Simple Mastectomy,  Right [place] Lymph Node Biopsy (09870), Right Pec Block       ALLOW ADDITIONAL 30 MINS FOR TURNOVER EXCEPT FOR PHYSICIAN'S BOUNCE DAY    Anesthesia: GEN    Admission:Outpatient     Date: 2022               NOT TO BE USED OUTSIDE OF THE OFFICE

## 2022-03-03 NOTE — TELEPHONE ENCOUNTER
Patient has appointment with Dr. Kalli Cintron this am and is asking if she can bring visitor with her to help her with information. Pt informed that yes she can bring visitor for office visit with Dr. Kalli Cintron. Will f/u after visit to make sure she has no further questions.

## 2022-03-07 ENCOUNTER — TELEPHONE (OUTPATIENT)
Dept: OTHER | Age: 57
End: 2022-03-07

## 2022-03-07 NOTE — TELEPHONE ENCOUNTER
Pt has question if it is okay to schedule her infusion for her RA treatment. She states she is symptomatic. Will check with Dr. Dago Santos due to her upcoming surgery.

## 2022-03-07 NOTE — TELEPHONE ENCOUNTER
If she can get the infusion today or in the next couple of days, that would be fine. It stays in your system about 4 weeks and I would prefer it not be given in the weeks coming up to surgery. Surgery is 4 weeks from Friday.     Thanks,  Franklin, DO

## 2022-03-24 ENCOUNTER — TELEPHONE (OUTPATIENT)
Dept: OTHER | Age: 57
End: 2022-03-24

## 2022-03-24 NOTE — TELEPHONE ENCOUNTER
Pt asking about medications to stop or take morning of surgery. Reviewed medication list and discussed them with patient. She is on baby ASA for unknown reason and will stop that 7 days prior to sx. She denies blood clots or heart issues. Pt given number to pre work so she can get scheduled. Pt asking about her prosthetics d/t bilateral mastectomy. Pt states she has gotten a annamarie and bra online. Encouraged patient to go to [address] [place] [address] for them to check her insurance and see what she is eligible for. Pt desires prosthesis post op. Encouraged her to get surgical bra also. Will f/u post op.

## 2022-03-31 ENCOUNTER — TELEPHONE (OUTPATIENT)
Dept: OTHER | Age: 57
End: 2022-03-31

## 2022-03-31 NOTE — TELEPHONE ENCOUNTER
Pt to try using Biotene or mouth lozenge to help with dry mouth. She is to also not to ingest increased sodium and continue water intake. Pt states her issue is mainly at night when she wakes up to urinate 3 or 4 times. Encouraged patient to try taking her Ditropan at night and that may help the frequent urination at night and also try a humidifier. Pt verbalized understanding. Reviewed upcoming appointments with patient and till call her post op.

## 2022-04-01 ENCOUNTER — HOSPITAL ENCOUNTER (OUTPATIENT)
Dept: PREADMISSION TESTING | Age: 57
Discharge: HOME | End: 2022-04-05
Payer: COMMERCIAL

## 2022-04-01 VITALS — WEIGHT: 243 LBS | HEIGHT: 63 IN | BODY MASS INDEX: 43.05 KG/M2

## 2022-04-01 LAB
ANION GAP SERPL CALCULATED.3IONS-SCNC: 15 MMOL/L (ref 7–19)
BUN BLDV-MCNC: 13 MG/DL (ref 6–20)
CALCIUM SERPL-MCNC: 9.3 MG/DL (ref 8.6–10)
CHLORIDE BLD-SCNC: 102 MMOL/L (ref 98–111)
CO2: 27 MMOL/L (ref 22–29)
CREAT SERPL-MCNC: 0.5 MG/DL (ref 0.5–0.9)
EKG P AXIS: 25 DEGREES
EKG P-R INTERVAL: 176 MS
EKG Q-T INTERVAL: 422 MS
EKG QRS DURATION: 90 MS
EKG QTC CALCULATION (BAZETT): 453 MS
EKG T AXIS: 75 DEGREES
GFR AFRICAN AMERICAN: >59
GFR NON-AFRICAN AMERICAN: >60
GLUCOSE BLD-MCNC: 136 MG/DL (ref 74–109)
POTASSIUM SERPL-SCNC: 4 MMOL/L (ref 3.5–5)
SODIUM BLD-SCNC: 144 MMOL/L (ref 136–145)

## 2022-04-01 PROCEDURE — 93010 ELECTROCARDIOGRAM REPORT: CPT | Performed by: INTERNAL MEDICINE

## 2022-04-01 PROCEDURE — 80048 BASIC METABOLIC PNL TOTAL CA: CPT

## 2022-04-01 PROCEDURE — 93005 ELECTROCARDIOGRAM TRACING: CPT | Performed by: ANESTHESIOLOGY

## 2022-04-08 ENCOUNTER — HOSPITAL ENCOUNTER (OUTPATIENT)
Age: 57
Setting detail: OBSERVATION
Discharge: HOME | End: 2022-04-09
Attending: SURGERY | Admitting: SURGERY
Payer: COMMERCIAL

## 2022-04-08 ENCOUNTER — ANESTHESIA (OUTPATIENT)
Dept: OPERATING ROOM | Age: 57
End: 2022-04-08
Payer: COMMERCIAL

## 2022-04-08 ENCOUNTER — ANESTHESIA EVENT (OUTPATIENT)
Dept: OPERATING ROOM | Age: 57
End: 2022-04-08
Payer: COMMERCIAL

## 2022-04-08 ENCOUNTER — HOSPITAL ENCOUNTER (OUTPATIENT)
Dept: NUCLEAR MEDICINE | Age: 57
Discharge: HOME | End: 2022-04-10
Payer: COMMERCIAL

## 2022-04-08 VITALS — TEMPERATURE: 99.1 F | DIASTOLIC BLOOD PRESSURE: 56 MMHG | OXYGEN SATURATION: 93 % | SYSTOLIC BLOOD PRESSURE: 110 MMHG

## 2022-04-08 DIAGNOSIS — C50.919 INFILTRATING LOBULAR CARCINOMA OF BREAST IN FEMALE (HCC): ICD-10-CM

## 2022-04-08 DIAGNOSIS — C50.919 INFILTRATING LOBULAR CARCINOMA OF BREAST IN FEMALE (HCC): Primary | Chronic | ICD-10-CM

## 2022-04-08 LAB — GLUCOSE BLD-MCNC: 154 MG/DL (ref 74–109)

## 2022-04-08 PROCEDURE — 3700000000 HC ANESTHESIA ATTENDED CARE: Performed by: SURGERY

## 2022-04-08 PROCEDURE — 2500000003 HC RX 250 WO HCPCS: Performed by: ANESTHESIOLOGY

## 2022-04-08 PROCEDURE — 36415 COLL VENOUS BLD VENIPUNCTURE: CPT

## 2022-04-08 PROCEDURE — 7100000001 HC PACU RECOVERY - ADDTL 15 MIN: Performed by: SURGERY

## 2022-04-08 PROCEDURE — 6370000000 HC RX 637 (ALT 250 FOR IP): Performed by: SURGERY

## 2022-04-08 PROCEDURE — A4217 STERILE WATER/SALINE, 500 ML: HCPCS | Performed by: SURGERY

## 2022-04-08 PROCEDURE — 19303 MAST SIMPLE COMPLETE: CPT | Performed by: SURGERY

## 2022-04-08 PROCEDURE — 38900 IO MAP OF SENT LYMPH NODE: CPT | Performed by: SURGERY

## 2022-04-08 PROCEDURE — 6370000000 HC RX 637 (ALT 250 FOR IP): Performed by: ANESTHESIOLOGY

## 2022-04-08 PROCEDURE — 6360000002 HC RX W HCPCS: Performed by: SURGERY

## 2022-04-08 PROCEDURE — 7100000000 HC PACU RECOVERY - FIRST 15 MIN: Performed by: SURGERY

## 2022-04-08 PROCEDURE — 6360000002 HC RX W HCPCS

## 2022-04-08 PROCEDURE — 2500000003 HC RX 250 WO HCPCS

## 2022-04-08 PROCEDURE — 88305 TISSUE EXAM BY PATHOLOGIST: CPT

## 2022-04-08 PROCEDURE — 82947 ASSAY GLUCOSE BLOOD QUANT: CPT

## 2022-04-08 PROCEDURE — A9520 TC99 TILMANOCEPT DIAG 0.5MCI: HCPCS | Performed by: SURGERY

## 2022-04-08 PROCEDURE — 2580000003 HC RX 258: Performed by: SURGERY

## 2022-04-08 PROCEDURE — 2720000010 HC SURG SUPPLY STERILE: Performed by: SURGERY

## 2022-04-08 PROCEDURE — 2709999900 HC NON-CHARGEABLE SUPPLY: Performed by: SURGERY

## 2022-04-08 PROCEDURE — 2700000000 HC OXYGEN THERAPY PER DAY

## 2022-04-08 PROCEDURE — 3700000001 HC ADD 15 MINUTES (ANESTHESIA): Performed by: SURGERY

## 2022-04-08 PROCEDURE — 3600000005 HC SURGERY LEVEL 5 BASE: Performed by: SURGERY

## 2022-04-08 PROCEDURE — 38525 BIOPSY/REMOVAL LYMPH NODES: CPT | Performed by: SURGERY

## 2022-04-08 PROCEDURE — 38792 RA TRACER ID OF SENTINL NODE: CPT | Performed by: SURGERY

## 2022-04-08 PROCEDURE — G0378 HOSPITAL OBSERVATION PER HR: HCPCS

## 2022-04-08 PROCEDURE — 2580000003 HC RX 258: Performed by: ANESTHESIOLOGY

## 2022-04-08 PROCEDURE — 3600000015 HC SURGERY LEVEL 5 ADDTL 15MIN: Performed by: SURGERY

## 2022-04-08 PROCEDURE — 38792 RA TRACER ID OF SENTINL NODE: CPT

## 2022-04-08 PROCEDURE — 6360000002 HC RX W HCPCS: Performed by: ANESTHESIOLOGY

## 2022-04-08 PROCEDURE — 2500000003 HC RX 250 WO HCPCS: Performed by: NURSE ANESTHETIST, CERTIFIED REGISTERED

## 2022-04-08 PROCEDURE — 3430000000 HC RX DIAGNOSTIC RADIOPHARMACEUTICAL: Performed by: SURGERY

## 2022-04-08 PROCEDURE — 6360000002 HC RX W HCPCS: Performed by: NURSE ANESTHETIST, CERTIFIED REGISTERED

## 2022-04-08 PROCEDURE — 88307 TISSUE EXAM BY PATHOLOGIST: CPT

## 2022-04-08 RX ORDER — ONDANSETRON 2 MG/ML
4 INJECTION INTRAMUSCULAR; INTRAVENOUS EVERY 6 HOURS PRN
Status: DISCONTINUED | OUTPATIENT
Start: 2022-04-08 | End: 2022-04-09 | Stop reason: HOSPADM

## 2022-04-08 RX ORDER — ZINC SULFATE 50(220)MG
50 CAPSULE ORAL DAILY
Status: DISCONTINUED | OUTPATIENT
Start: 2022-04-09 | End: 2022-04-09 | Stop reason: HOSPADM

## 2022-04-08 RX ORDER — LETROZOLE 2.5 MG/1
2.5 TABLET, FILM COATED ORAL DAILY
Status: DISCONTINUED | OUTPATIENT
Start: 2022-04-08 | End: 2022-04-09 | Stop reason: HOSPADM

## 2022-04-08 RX ORDER — MORPHINE SULFATE 4 MG/ML
4 INJECTION, SOLUTION INTRAMUSCULAR; INTRAVENOUS
Status: DISCONTINUED | OUTPATIENT
Start: 2022-04-08 | End: 2022-04-09 | Stop reason: HOSPADM

## 2022-04-08 RX ORDER — PROPOFOL 10 MG/ML
INJECTION, EMULSION INTRAVENOUS PRN
Status: DISCONTINUED | OUTPATIENT
Start: 2022-04-08 | End: 2022-04-08 | Stop reason: SDUPTHER

## 2022-04-08 RX ORDER — LEVOTHYROXINE SODIUM 0.03 MG/1
25 TABLET ORAL DAILY
Status: DISCONTINUED | OUTPATIENT
Start: 2022-04-09 | End: 2022-04-09 | Stop reason: HOSPADM

## 2022-04-08 RX ORDER — LEVOTHYROXINE SODIUM 0.1 MG/1
200 TABLET ORAL DAILY
Status: DISCONTINUED | OUTPATIENT
Start: 2022-04-09 | End: 2022-04-09 | Stop reason: HOSPADM

## 2022-04-08 RX ORDER — FOLIC ACID 1 MG/1
1 TABLET ORAL DAILY
Status: DISCONTINUED | OUTPATIENT
Start: 2022-04-08 | End: 2022-04-09 | Stop reason: HOSPADM

## 2022-04-08 RX ORDER — SODIUM CHLORIDE 0.9 % (FLUSH) 0.9 %
5-40 SYRINGE (ML) INJECTION EVERY 12 HOURS SCHEDULED
Status: DISCONTINUED | OUTPATIENT
Start: 2022-04-08 | End: 2022-04-09 | Stop reason: HOSPADM

## 2022-04-08 RX ORDER — POLYETHYLENE GLYCOL 3350 17 G/17G
17 POWDER, FOR SOLUTION ORAL DAILY
Status: DISCONTINUED | OUTPATIENT
Start: 2022-04-08 | End: 2022-04-09 | Stop reason: HOSPADM

## 2022-04-08 RX ORDER — SCOLOPAMINE TRANSDERMAL SYSTEM 1 MG/1
1 PATCH, EXTENDED RELEASE TRANSDERMAL
Status: DISCONTINUED | OUTPATIENT
Start: 2022-04-08 | End: 2022-04-08

## 2022-04-08 RX ORDER — ASPIRIN 81 MG/1
81 TABLET ORAL DAILY
Status: DISCONTINUED | OUTPATIENT
Start: 2022-04-08 | End: 2022-04-09 | Stop reason: HOSPADM

## 2022-04-08 RX ORDER — FENTANYL CITRATE 50 UG/ML
INJECTION, SOLUTION INTRAMUSCULAR; INTRAVENOUS PRN
Status: DISCONTINUED | OUTPATIENT
Start: 2022-04-08 | End: 2022-04-08 | Stop reason: SDUPTHER

## 2022-04-08 RX ORDER — EPHEDRINE SULFATE 50 MG/ML
INJECTION, SOLUTION INTRAVENOUS PRN
Status: DISCONTINUED | OUTPATIENT
Start: 2022-04-08 | End: 2022-04-08 | Stop reason: SDUPTHER

## 2022-04-08 RX ORDER — ACETAMINOPHEN 500 MG
1000 TABLET ORAL EVERY 8 HOURS SCHEDULED
Status: DISCONTINUED | OUTPATIENT
Start: 2022-04-08 | End: 2022-04-09 | Stop reason: HOSPADM

## 2022-04-08 RX ORDER — SODIUM CHLORIDE 9 MG/ML
25 INJECTION, SOLUTION INTRAVENOUS PRN
Status: DISCONTINUED | OUTPATIENT
Start: 2022-04-08 | End: 2022-04-09 | Stop reason: HOSPADM

## 2022-04-08 RX ORDER — ONDANSETRON 2 MG/ML
INJECTION INTRAMUSCULAR; INTRAVENOUS PRN
Status: DISCONTINUED | OUTPATIENT
Start: 2022-04-08 | End: 2022-04-08 | Stop reason: SDUPTHER

## 2022-04-08 RX ORDER — OXYBUTYNIN CHLORIDE 5 MG/1
5 TABLET ORAL 2 TIMES DAILY
Status: DISCONTINUED | OUTPATIENT
Start: 2022-04-08 | End: 2022-04-09 | Stop reason: HOSPADM

## 2022-04-08 RX ORDER — HYDROMORPHONE HYDROCHLORIDE 1 MG/ML
0.5 INJECTION, SOLUTION INTRAMUSCULAR; INTRAVENOUS; SUBCUTANEOUS EVERY 5 MIN PRN
Status: DISCONTINUED | OUTPATIENT
Start: 2022-04-08 | End: 2022-04-08 | Stop reason: HOSPADM

## 2022-04-08 RX ORDER — FUROSEMIDE 10 MG/ML
INJECTION INTRAMUSCULAR; INTRAVENOUS PRN
Status: DISCONTINUED | OUTPATIENT
Start: 2022-04-08 | End: 2022-04-08 | Stop reason: SDUPTHER

## 2022-04-08 RX ORDER — ONDANSETRON 2 MG/ML
4 INJECTION INTRAMUSCULAR; INTRAVENOUS
Status: DISCONTINUED | OUTPATIENT
Start: 2022-04-08 | End: 2022-04-08 | Stop reason: HOSPADM

## 2022-04-08 RX ORDER — OXYCODONE HYDROCHLORIDE 5 MG/1
5 TABLET ORAL EVERY 4 HOURS PRN
Status: DISCONTINUED | OUTPATIENT
Start: 2022-04-08 | End: 2022-04-09 | Stop reason: HOSPADM

## 2022-04-08 RX ORDER — DEXAMETHASONE SODIUM PHOSPHATE 4 MG/ML
INJECTION, SOLUTION INTRA-ARTICULAR; INTRALESIONAL; INTRAMUSCULAR; INTRAVENOUS; SOFT TISSUE PRN
Status: DISCONTINUED | OUTPATIENT
Start: 2022-04-08 | End: 2022-04-08 | Stop reason: HOSPADM

## 2022-04-08 RX ORDER — ONDANSETRON 4 MG/1
4 TABLET, ORALLY DISINTEGRATING ORAL EVERY 8 HOURS PRN
Status: DISCONTINUED | OUTPATIENT
Start: 2022-04-08 | End: 2022-04-09 | Stop reason: HOSPADM

## 2022-04-08 RX ORDER — LIDOCAINE HYDROCHLORIDE 10 MG/ML
INJECTION, SOLUTION INFILTRATION; PERINEURAL PRN
Status: DISCONTINUED | OUTPATIENT
Start: 2022-04-08 | End: 2022-04-08 | Stop reason: SDUPTHER

## 2022-04-08 RX ORDER — PANTOPRAZOLE SODIUM 40 MG/1
40 TABLET, DELAYED RELEASE ORAL
Status: DISCONTINUED | OUTPATIENT
Start: 2022-04-09 | End: 2022-04-09 | Stop reason: HOSPADM

## 2022-04-08 RX ORDER — CEFAZOLIN SODIUM 1 G/3ML
INJECTION, POWDER, FOR SOLUTION INTRAMUSCULAR; INTRAVENOUS PRN
Status: DISCONTINUED | OUTPATIENT
Start: 2022-04-08 | End: 2022-04-08 | Stop reason: SDUPTHER

## 2022-04-08 RX ORDER — SODIUM CHLORIDE 0.9 % (FLUSH) 0.9 %
5-40 SYRINGE (ML) INJECTION PRN
Status: DISCONTINUED | OUTPATIENT
Start: 2022-04-08 | End: 2022-04-09 | Stop reason: HOSPADM

## 2022-04-08 RX ORDER — ISOSULFAN BLUE 50 MG/5ML
INJECTION, SOLUTION SUBCUTANEOUS PRN
Status: DISCONTINUED | OUTPATIENT
Start: 2022-04-08 | End: 2022-04-08 | Stop reason: HOSPADM

## 2022-04-08 RX ORDER — SODIUM CHLORIDE, SODIUM LACTATE, POTASSIUM CHLORIDE, CALCIUM CHLORIDE 600; 310; 30; 20 MG/100ML; MG/100ML; MG/100ML; MG/100ML
INJECTION, SOLUTION INTRAVENOUS CONTINUOUS
Status: DISCONTINUED | OUTPATIENT
Start: 2022-04-08 | End: 2022-04-08

## 2022-04-08 RX ORDER — OXYCODONE HYDROCHLORIDE 5 MG/1
10 TABLET ORAL EVERY 4 HOURS PRN
Status: DISCONTINUED | OUTPATIENT
Start: 2022-04-08 | End: 2022-04-09 | Stop reason: HOSPADM

## 2022-04-08 RX ORDER — DEXAMETHASONE SODIUM PHOSPHATE 10 MG/ML
INJECTION, SOLUTION INTRAMUSCULAR; INTRAVENOUS PRN
Status: DISCONTINUED | OUTPATIENT
Start: 2022-04-08 | End: 2022-04-08 | Stop reason: SDUPTHER

## 2022-04-08 RX ORDER — ROCURONIUM BROMIDE 10 MG/ML
INJECTION, SOLUTION INTRAVENOUS PRN
Status: DISCONTINUED | OUTPATIENT
Start: 2022-04-08 | End: 2022-04-08 | Stop reason: SDUPTHER

## 2022-04-08 RX ORDER — DEXAMETHASONE SODIUM PHOSPHATE 4 MG/ML
4 INJECTION, SOLUTION INTRA-ARTICULAR; INTRALESIONAL; INTRAMUSCULAR; INTRAVENOUS; SOFT TISSUE ONCE
Status: COMPLETED | OUTPATIENT
Start: 2022-04-08 | End: 2022-04-08

## 2022-04-08 RX ORDER — ROPIVACAINE HYDROCHLORIDE 2 MG/ML
INJECTION, SOLUTION EPIDURAL; INFILTRATION; PERINEURAL PRN
Status: DISCONTINUED | OUTPATIENT
Start: 2022-04-08 | End: 2022-04-08 | Stop reason: HOSPADM

## 2022-04-08 RX ORDER — HYDROMORPHONE HYDROCHLORIDE 1 MG/ML
0.25 INJECTION, SOLUTION INTRAMUSCULAR; INTRAVENOUS; SUBCUTANEOUS EVERY 5 MIN PRN
Status: DISCONTINUED | OUTPATIENT
Start: 2022-04-08 | End: 2022-04-08 | Stop reason: HOSPADM

## 2022-04-08 RX ADMIN — LIDOCAINE HYDROCHLORIDE 50 MG: 10 INJECTION, SOLUTION INFILTRATION; PERINEURAL at 14:30

## 2022-04-08 RX ADMIN — SODIUM CHLORIDE, SODIUM LACTATE, POTASSIUM CHLORIDE, AND CALCIUM CHLORIDE: 600; 310; 30; 20 INJECTION, SOLUTION INTRAVENOUS at 15:35

## 2022-04-08 RX ADMIN — HYDROMORPHONE HYDROCHLORIDE 0.5 MG: 1 INJECTION, SOLUTION INTRAMUSCULAR; INTRAVENOUS; SUBCUTANEOUS at 16:13

## 2022-04-08 RX ADMIN — FOLIC ACID 1 MG: 1 TABLET ORAL at 21:20

## 2022-04-08 RX ADMIN — PROPOFOL 150 MG: 10 INJECTION, EMULSION INTRAVENOUS at 14:30

## 2022-04-08 RX ADMIN — OXYCODONE HYDROCHLORIDE 10 MG: 5 TABLET ORAL at 21:20

## 2022-04-08 RX ADMIN — SODIUM CHLORIDE, PRESERVATIVE FREE 20 MG: 5 INJECTION INTRAVENOUS at 11:23

## 2022-04-08 RX ADMIN — SUGAMMADEX 200 MG: 100 INJECTION, SOLUTION INTRAVENOUS at 17:40

## 2022-04-08 RX ADMIN — FUROSEMIDE 20 MG: 10 INJECTION, SOLUTION INTRAMUSCULAR; INTRAVENOUS at 14:54

## 2022-04-08 RX ADMIN — CEFAZOLIN SODIUM 3000 MG: 1 INJECTION, POWDER, FOR SOLUTION INTRAMUSCULAR; INTRAVENOUS at 14:41

## 2022-04-08 RX ADMIN — PHENYLEPHRINE HYDROCHLORIDE 80 MCG: 10 INJECTION INTRAVENOUS at 14:54

## 2022-04-08 RX ADMIN — DEXAMETHASONE SODIUM PHOSPHATE 4 MG: 4 INJECTION, SOLUTION INTRAMUSCULAR; INTRAVENOUS at 11:23

## 2022-04-08 RX ADMIN — LETROZOLE 2.5 MG: 2.5 TABLET ORAL at 21:20

## 2022-04-08 RX ADMIN — ASPIRIN 81 MG: 81 TABLET, COATED ORAL at 21:20

## 2022-04-08 RX ADMIN — FENTANYL CITRATE 100 MCG: 50 INJECTION, SOLUTION INTRAMUSCULAR; INTRAVENOUS at 14:30

## 2022-04-08 RX ADMIN — ROCURONIUM BROMIDE 50 MG: 10 INJECTION, SOLUTION INTRAVENOUS at 14:30

## 2022-04-08 RX ADMIN — ONDANSETRON 4 MG: 2 INJECTION INTRAMUSCULAR; INTRAVENOUS at 17:40

## 2022-04-08 RX ADMIN — DEXAMETHASONE SODIUM PHOSPHATE 10 MG: 10 INJECTION, SOLUTION INTRAMUSCULAR; INTRAVENOUS at 14:44

## 2022-04-08 RX ADMIN — HYDROMORPHONE HYDROCHLORIDE 0.5 MG: 1 INJECTION, SOLUTION INTRAMUSCULAR; INTRAVENOUS; SUBCUTANEOUS at 15:03

## 2022-04-08 RX ADMIN — SODIUM CHLORIDE, SODIUM LACTATE, POTASSIUM CHLORIDE, AND CALCIUM CHLORIDE: 600; 310; 30; 20 INJECTION, SOLUTION INTRAVENOUS at 11:22

## 2022-04-08 RX ADMIN — TILMANOCEPT 0.5 MILLICURIE: KIT at 12:57

## 2022-04-08 RX ADMIN — OXYBUTYNIN CHLORIDE 5 MG: 5 TABLET ORAL at 21:20

## 2022-04-08 RX ADMIN — EPHEDRINE SULFATE 10 MG: 50 INJECTION INTRAMUSCULAR; INTRAVENOUS; SUBCUTANEOUS at 15:31

## 2022-04-08 RX ADMIN — EPHEDRINE SULFATE 10 MG: 50 INJECTION INTRAMUSCULAR; INTRAVENOUS; SUBCUTANEOUS at 15:44

## 2022-04-08 RX ADMIN — PHENYLEPHRINE HYDROCHLORIDE 80 MCG: 10 INJECTION INTRAVENOUS at 14:41

## 2022-04-08 RX ADMIN — ACETAMINOPHEN 1000 MG: 500 TABLET ORAL at 21:20

## 2022-04-08 ASSESSMENT — LIFESTYLE VARIABLES: SMOKING_STATUS: 0

## 2022-04-08 ASSESSMENT — PAIN SCALES - GENERAL
PAINLEVEL_OUTOF10: 0
PAINLEVEL_OUTOF10: 9

## 2022-04-08 ASSESSMENT — PAIN - FUNCTIONAL ASSESSMENT: PAIN_FUNCTIONAL_ASSESSMENT: 0-10

## 2022-04-08 NOTE — ANESTHESIA PRE PROCEDURE
Department of Anesthesiology  Preprocedure Note       Name:  Romy Kay   Age:  56 y.o.  :  1965                                          MRN:  [id number]         Date:  2022      Surgeon: Sierra Vogt):  Candice Harrison DO    Procedure: Procedure(s):  RIGHT SIMPLE MASTECTOMY, RIGHT SENTINEL LYMPH NODY BIOPSY, RIGHT PEC BLOCK    Medications prior to admission:   Prior to Admission medications    Medication Sig Start Date End Date Taking?  Authorizing Provider   Golimumab (SIMPONI ARIA IV) Infuse 1 Dose intravenously Every 8 weeks; opit    Historical Provider, MD   letrozole [facility]) 2.5 MG tablet Take 1 tablet by mouth daily 3/3/22   Justin Andrade MD   oxybutynin (DITROPAN) 5 MG tablet Take 1 tablet by mouth 2 times daily 3/3/22   Justin Andrade MD   aspirin 81 MG EC tablet Take 81 mg by mouth daily    Historical Provider, MD   meloxicam (MOBIC) 15 MG tablet Take 15 mg by mouth daily    Historical Provider, MD   methotrexate (RHEUMATREX) 2.5 MG chemo tablet Take 7.5 mg by mouth once a week Was 20mg but pt taking zero for two weeks and then decreasing to 7.5mg 22   Historical Provider, MD   levothyroxine (SYNTHROID) 25 MCG tablet Take 25 mcg by mouth Daily  21   Historical Provider, MD   EUTHYROX 200 MCG tablet Take 200 mcg by mouth Daily  21   Historical Provider, MD   metFORMIN (GLUCOPHAGE) 500 MG tablet Take 500 mg by mouth 2 times daily (with meals)  22   Historical Provider, MD   Fiber POWD Take 1 Dose by mouth every other day     Historical Provider, MD   omeprazole (PRILOSEC) 20 MG delayed release capsule Take 20 mg by mouth Daily  21   Historical Provider, MD   folic acid (FOLVITE) 1 MG tablet Take 1 mg by mouth daily  22   Historical Provider, MD   Multiple Vitamins-Minerals (THERA M PLUS) TABS Take 1 tablet by mouth daily    Historical Provider, MD   zinc gluconate 50 MG tablet Take 50 mg by mouth daily    Historical Provider, MD       Current medications:    No current facility-administered medications for this encounter. Allergies: Allergies   Allergen Reactions    Barium-Containing Compounds Shortness Of Breath and Anaphylaxis    Sulfa Antibiotics Itching and Rash       Problem List:    Patient Active Problem List   Diagnosis Code    Infiltrating lobular carcinoma of breast in female [facility]) C50.919       Past Medical History:        Diagnosis Date    Cancer ([facility]     breast    COVID-19 2022    cold symptoms with diarrhea    Diabetes mellitus ([facility]     GERD (gastroesophageal reflux disease)     Hypertension     mild; no antihypertensives; just aspirin    Hypothyroidism        Past Surgical History:        Procedure Laterality Date    BREAST SURGERY       SECTION      CHOLECYSTECTOMY      DILATION AND CURETTAGE OF UTERUS      DILATION AND CURETTAGE OF UTERUS  1991    HYSTERECTOMY, TOTAL ABDOMINAL      US BREAST NEEDLE BIOPSY RIGHT Right 2022    US BREAST NEEDLE BIOPSY RIGHT 2022 [facility] [address]       Social History:    Social History     Tobacco Use    Smoking status: Former Smoker    Smokeless tobacco: Never Used   Substance Use Topics    Alcohol use: Not Currently                                Counseling given: Not Answered      Vital Signs (Current): There were no vitals filed for this visit.                                            BP Readings from Last 3 Encounters:   22 (!) 162/82       NPO Status:                                                                                 BMI:   Wt Readings from Last 3 Encounters:   22 243 lb (110.2 kg)   22 238 lb 6.4 oz (108.1 kg)   02/10/22 243 lb 12.8 oz (110.6 kg)     There is no height or weight on file to calculate BMI.    CBC:   Lab Results   Component Value Date    WBC 8.60 2022    RBC 4.10 2022    HGB 13.7 2022    HCT 41.4 2022    .0 2022    RDW 13.4 2022     2022 CMP:   Lab Results   Component Value Date     04/01/2022    K 4.0 04/01/2022     04/01/2022    CO2 27 04/01/2022    BUN 13 04/01/2022    CREATININE 0.5 04/01/2022    GFRAA >59 04/01/2022    LABGLOM >60 04/01/2022    GLUCOSE 136 04/01/2022    CALCIUM 9.3 04/01/2022       POC Tests: No results for input(s): POCGLU, POCNA, POCK, POCCL, POCBUN, POCHEMO, POCHCT in the last 72 hours. Coags: No results found for: PROTIME, INR, APTT    HCG (If Applicable): No results found for: PREGTESTUR, PREGSERUM, HCG, HCGQUANT     ABGs: No results found for: PHART, PO2ART, VYV4ACH, MII4JWV, BEART, V0QLVALN     Type & Screen (If Applicable):  No results found for: LABABO, LABRH    Drug/Infectious Status (If Applicable):  No results found for: HIV, HEPCAB    COVID-19 Screening (If Applicable): No results found for: COVID19        Anesthesia Evaluation  Patient summary reviewed no history of anesthetic complications:   Airway: Mallampati: I  TM distance: >3 FB   Neck ROM: full  Mouth opening: > = 3 FB Dental:          Pulmonary:normal exam  breath sounds clear to auscultation      (-) asthma, recent URI, sleep apnea and not a current smoker                           Cardiovascular:  Exercise tolerance: good (>4 METS),   (+) hypertension:,     (-) pacemaker, past MI, CABG/stent and  angina    ECG reviewed  Rhythm: regular  Rate: normal           Beta Blocker:  Not on Beta Blocker         Neuro/Psych:      (-) seizures, TIA and CVA           GI/Hepatic/Renal:   (+) GERD: well controlled, morbid obesity     (-) liver disease and no renal disease       Endo/Other:    (+) DiabetesType II DM, , hypothyroidism::., malignancy/cancer (Breast). (-) hyperthyroidism               Abdominal:   (+) obese,           Vascular: Other Findings:           Anesthesia Plan      general     ASA 3     (Preop dexamethasone, famotidine, scopolamine)  Induction: intravenous.     MIPS: Postoperative opioids intended and Prophylactic antiemetics administered. Anesthetic plan and risks discussed with patient. Use of blood products discussed with patient whom consented to blood products.                    Gregg Delong MD   4/8/2022

## 2022-04-08 NOTE — H&P
Ms. Mari Renee  is recently status post ultrasound guided breast biopsy  on the right which revealed a 4  cm intermediate grade ER positive and HER 2 negative invasive lobular carcinoma.       DISCUSSION:  I had a lengthy discussion with Ms. Mari Renee  and her family about the ramifications of the diagnosis of breast cancer. We discussed the pathophysiology of cancer in general and also the ways in which surgery, radiation therapy, and chemotherapy are utilized in the treatment of different types of cancers. We also explained how these modalities related to her situation in particular. We discussed the pathophysiology of breast cancer and some length,its relationship to hormone replacement therapy, and some of the genetic aspects involved in familial breast cancers. We discussed the BrCa genetic analysis and why it is  appropriate for her. We discussed breast MRI and how it assists in evaluation of breast cancers and the results of her MRI if done.       3/1/2022 MRI      Impression   1. Residual enhancing mass at the biopsy site in the right breast at   10:00 in the mid depth region. This is consistent with known lobular   carcinoma. BI-RADS 6.   2. There is an additional nodule located centrally in the right breast   at 12-1:00 in the mid depth region with similar enhancing   characteristics and likely representing an additional site of lobular   neoplasm. Consider second look ultrasound if it might alter   therapeutic decisions. 3. There is a smoothly marginated 6 mm nodule located close to the   left nipple at 9:00 with persistent type I enhancement. This is likely   a fibroadenoma or papilloma. Consider second look ultrasound. BI-RADS   3.   4. No suspicious-appearing lymph nodes.    Signed by Dr Ana Sood** ADDENDUM #1 **          We discussed that we will order an ultrasound of the left breast lesion to further characterize it as benign.      We discussed the surgical options including simple mastectomy and lumpectomy with  sentinel lymph node biopsy as well as the possibility of axillary lymph node dissection. We explained in depth why breast conservation therapy requires radiation treatments for the majority of women. We discussed that given the location of her two tumors in her breast, I did not feel the cosmetic outcome and risk for positive margin would be beneficial, and mastectomy is recommended. She states understanding of this. I explained that most women treated for invasive malignancy do receive systemic therapy, hormonal therapy or  chemotherapy postoperatively depending upon the final pathology, the lymph node status, and the hormone receptor status. I discussed Oncotype Dx as a tool which aids in the decision for chemotherapy or hormonal therapy. We also discussed the possibility of breast reconstruction if a mastectomy was required. .  I explained to her the different techniques including surgical reconstruction vs prosthesis and she does not wish to pursue surgical reconstruction at this time. After a prolonged discussion lasting 30 minutes  we felt it was most appropriate that she undergo right simple mastectomy and and sentinel node biopsy. Will send her final specimen for Oncotype DX.          Orders Placed This Encounter   Procedures    US BREAST LIMITED LEFT             Patient has decided to have bilateral mastectomies. The risks, benefits, and alternatives were discussed with the pt. She is willing to accept the risks and proceed with a bilateral mastectomy with right sided sentinel lymph node biopsy and bilateral pec block. . The surgical risks included but not limited to bleeding, infection, perforation, recurrence, risk of needing further surgery, etc. The anesthetic risks included heart attacks, strokes, pneumonia, phlebitis, etc.  She is willing to accept all risks and proceed with surgery. No guarantees have been given.     [place], [place]  65/94/58  12:29 PM

## 2022-04-08 NOTE — OP NOTE
Operative Note      Patient: Romy Kay  YOB: 1965  MRN: [id number]    Date of Procedure: 4/8/2022    Pre-Op Diagnosis: C50.919    Post-Op Diagnosis: Same       Procedure(s):  Bilateral SIMPLE MASTECTOMY, Right SENTINEL LYMPH NODY BIOPSY, Bilateral PEC BLOCK    Surgeon(s):  Candice Harrison DO    Assistant:   * No surgical staff found *    Anesthesia: General    Estimated Blood Loss (mL): 50    Complications: None    Specimens:   ID Type Source Tests Collected by Time Destination   A : right breast - long lateral, short superior Tissue Breast SURGICAL PATHOLOGY Mariola RADHA Stephens, DO 3/8/3623 1536    B : additional right breast-inferiolateral skin specimen Tissue Breast SURGICAL PATHOLOGY Candice Harrison, DO 5/3/5548 1536    C : Left breast tissue- long lateral, short superior Tissue Breast SURGICAL PATHOLOGY Candice Harrison, DO 8/9/7788 1549    D : Right sentinel node Tissue Breast SURGICAL PATHOLOGY Mariola RADHA Stephens, DO 9/4/7627 1704        Implants:  * No implants in log *      Drains:   Closed/Suction Drain Lateral;Right Breast Bulb 15 Irish (Active)       Closed/Suction Drain Inferior;Right;Midline Breast Bulb 15 Irish (Active)       Closed/Suction Drain Inferior; Lateral;Left Breast Bulb 15 Irish (Active)       Urethral Catheter Straight-tip 16 fr (Active)     Detailed Description of Procedure:   Patient was taken to the main operating room, placed on the operating table  supine. Intravenous  Antibiotics were administered and the patient was placed under general endotracheal anesthesia. A timeout was performed identifying the correct patient, equipment, and antibiotics given. The skin was then prepped with chlorhexidine and administered a bilateral pectoral  block. This was done utilizing 50 mL of 0.2% ropivacaine, 8 mg of  Decadron, and 50 mL of saline.   The intercostal perforators were injected  under ultrasound guidance, then the inframammary crease as well  as the infraclavicular area were injected. The interpectoral space was then injected  as well as the lateral intercostal perforators again with ultrasound  guidance. In the lateral upper outer quadrant of the right breast 5000 microgray of Lymposeek was injected into the dermal layer of the skin. The chest and axilla were prepped and draped in the usual sterile fashion. The right mastectomy was then performed via a fishmouth incision including that of the nipple-areolar complex. A combination of sharp and electrocautery dissection were used to elevate superior and inferior skin flaps, ensuring to avoid injury to the skin, while taking the appropriate amount of breast tissue. The breast was then removed in a medial to lateral fashion off the pectoralis major muscle. Once the breast was about 2/3rd of the way removed, Isosulfan Blue was injected into the upper arm for reverse axillary mapping. A Gygerprobe was then used to identify the area of the sentinel node, and measurements were taken up to 400. This sentinel node was removed. The remaining breast tissue was divided from the muscle and marked with a stich laterally and short stitch superioerly. The specimens were sent to the lab. The wound was irrigated copiously and hemostasis was obtained. Two Jack-Regalado drains were placed, one along the inferior mammary crease and one in the axilla. An additional inferolateral skin edge was excised to allow for better cosmesis and this was also sent to pathology. The wound was then closed in a multiple-layered fashion with 00 Vicryl, 000 Vicryl and the skin was closed and covered with Perineo Dressing. Biopatches were placed at the exit sites of the drains and dressings were applied. A new set of instruments were then used for the prophylactic left breast mastectomy. New gowns and gloves were donned by the entire staff.  The left breast mastectomy was then performed via a fishmouth incision including that of the nipple-areolar complex on the left breast. A combination of sharp and electrocautery dissection were used to elevate superior and inferior skin flaps, ensuring to avoid injury to the skin, while taking the appropriate amount of breast tissue. The breast was then removed in a medial to lateral fashion off the pectoralis major muscle. marked with a stich laterally and short stitch superioerly. The specimen was sent to the lab. The wound was irrigated copiously and hemostasis was obtained. One Jack-Regalado drain was placed along the inferior mammary crease. The wound was then closed in a multiple-layered fashion with 00 Vicryl, 000 Vicryl and the skin was closed and covered with Perineo Dressing. Biopatch was placed at the exit site of the left sided drain and dressings were applied. Sponge, needle, instrument count correct on 2 occasions. Estimated  intraoperative blood loss 50 mL. Ms. Lucie Anne tolerated her surgery well and she was taken to PACU in  satisfactory condition.           ________________________________  Aaron Gama DO          Electronically signed by Aaron Gama DO on 0/8/0954 at 5:14 PM

## 2022-04-08 NOTE — PROGRESS NOTES
Suction tubing, suction tip, and Bovie changed by surgical staff and this nurse before starting surgery on left breast

## 2022-04-08 NOTE — PROGRESS NOTES
Additional right breast tissue taken out of surgical room 10 by this nurse, Luciana Neves. Taken to  and given to Derian to take to pathology.

## 2022-04-08 NOTE — ANESTHESIA POSTPROCEDURE EVALUATION
Department of Anesthesiology  Postprocedure Note    Patient: Rocio Barriga  MRN: [id number]  YOB: 1965  Date of evaluation: 4/8/2022  Time:  6:13 PM     Procedure Summary     Date: 04/08/22 Room / Location: [address]    Anesthesia Start: 9187 Anesthesia Stop: 1813    Procedure: Bilateral SIMPLE MASTECTOMY, Right SENTINEL LYMPH NODY BIOPSY, Bilateral PEC BLOCK (Bilateral Breast) Diagnosis:       Malignant neoplasm of right female breast, unspecified estrogen receptor status, unspecified site of breast ([facility] [address]      (C97.958)    Surgeons: Shan Infante DO Responsible Provider: PAOLA Reyes CRNA    Anesthesia Type: general ASA Status: 3          Anesthesia Type: general    Burton Phase I:      Burton Phase II:      Last vitals: Reviewed and per EMR flowsheets.        Anesthesia Post Evaluation    Patient location during evaluation: PACU  Patient participation: complete - patient participated  Level of consciousness: sleepy but conscious  Pain score: 0  Airway patency: patent  Nausea & Vomiting: no nausea and no vomiting  Complications: no  Cardiovascular status: blood pressure returned to baseline  Respiratory status: acceptable and nasal cannula  Hydration status: euvolemic

## 2022-04-08 NOTE — PROGRESS NOTES
Right breast specimen taken out of the room at this time by Wilfredo Solorio RN. Given to Derian to take to pathology.

## 2022-04-09 VITALS
BODY MASS INDEX: 43.05 KG/M2 | HEIGHT: 63 IN | RESPIRATION RATE: 20 BRPM | SYSTOLIC BLOOD PRESSURE: 143 MMHG | HEART RATE: 65 BPM | WEIGHT: 243 LBS | OXYGEN SATURATION: 90 % | TEMPERATURE: 96.4 F | DIASTOLIC BLOOD PRESSURE: 67 MMHG

## 2022-04-09 PROBLEM — C50.919 INVASIVE LOBULAR CARCINOMA OF BREAST IN FEMALE (HCC): Status: ACTIVE | Noted: 2022-04-09

## 2022-04-09 LAB
GLUCOSE BLD-MCNC: 216 MG/DL (ref 70–99)
GLUCOSE BLD-MCNC: 250 MG/DL (ref 70–99)
GLUCOSE BLD-MCNC: 268 MG/DL (ref 70–99)
GLUCOSE BLD-MCNC: 333 MG/DL (ref 70–99)
PERFORMED ON: ABNORMAL

## 2022-04-09 PROCEDURE — 6370000000 HC RX 637 (ALT 250 FOR IP): Performed by: SURGERY

## 2022-04-09 PROCEDURE — G0378 HOSPITAL OBSERVATION PER HR: HCPCS

## 2022-04-09 PROCEDURE — 99024 POSTOP FOLLOW-UP VISIT: CPT | Performed by: SURGERY

## 2022-04-09 PROCEDURE — 82947 ASSAY GLUCOSE BLOOD QUANT: CPT

## 2022-04-09 PROCEDURE — 2700000000 HC OXYGEN THERAPY PER DAY

## 2022-04-09 RX ORDER — DEXTROSE MONOHYDRATE 50 MG/ML
100 INJECTION, SOLUTION INTRAVENOUS PRN
Status: DISCONTINUED | OUTPATIENT
Start: 2022-04-09 | End: 2022-04-09 | Stop reason: HOSPADM

## 2022-04-09 RX ORDER — OXYCODONE HYDROCHLORIDE 5 MG/1
5 TABLET ORAL EVERY 4 HOURS PRN
Qty: 12 TABLET | Refills: 0 | Status: SHIPPED | OUTPATIENT
Start: 2022-04-09 | End: 2022-04-12

## 2022-04-09 RX ORDER — DEXTROSE MONOHYDRATE 25 G/50ML
12.5 INJECTION, SOLUTION INTRAVENOUS PRN
Status: DISCONTINUED | OUTPATIENT
Start: 2022-04-09 | End: 2022-04-09 | Stop reason: CLARIF

## 2022-04-09 RX ORDER — NICOTINE POLACRILEX 4 MG
15 LOZENGE BUCCAL PRN
Status: DISCONTINUED | OUTPATIENT
Start: 2022-04-09 | End: 2022-04-09 | Stop reason: CLARIF

## 2022-04-09 RX ADMIN — FOLIC ACID 1 MG: 1 TABLET ORAL at 07:31

## 2022-04-09 RX ADMIN — ZINC SULFATE 220 MG (50 MG) CAPSULE 50 MG: CAPSULE at 07:30

## 2022-04-09 RX ADMIN — LEVOTHYROXINE SODIUM 200 MCG: 25 TABLET ORAL at 06:23

## 2022-04-09 RX ADMIN — LETROZOLE 2.5 MG: 2.5 TABLET ORAL at 07:31

## 2022-04-09 RX ADMIN — ACETAMINOPHEN 1000 MG: 500 TABLET ORAL at 06:23

## 2022-04-09 RX ADMIN — LEVOTHYROXINE SODIUM 25 MCG: 25 TABLET ORAL at 07:32

## 2022-04-09 RX ADMIN — OXYCODONE HYDROCHLORIDE 10 MG: 5 TABLET ORAL at 06:23

## 2022-04-09 RX ADMIN — INSULIN LISPRO 4 UNITS: 100 INJECTION, SOLUTION INTRAVENOUS; SUBCUTANEOUS at 09:44

## 2022-04-09 RX ADMIN — PSYLLIUM HUSK 1 PACKET: 3.4 POWDER ORAL at 07:31

## 2022-04-09 RX ADMIN — ASPIRIN 81 MG: 81 TABLET, COATED ORAL at 07:31

## 2022-04-09 RX ADMIN — OXYBUTYNIN CHLORIDE 5 MG: 5 TABLET ORAL at 07:31

## 2022-04-09 RX ADMIN — PANTOPRAZOLE SODIUM 40 MG: 40 TABLET, DELAYED RELEASE ORAL at 06:23

## 2022-04-09 RX ADMIN — OXYCODONE HYDROCHLORIDE 10 MG: 5 TABLET ORAL at 02:13

## 2022-04-09 ASSESSMENT — PAIN SCALES - GENERAL
PAINLEVEL_OUTOF10: 0
PAINLEVEL_OUTOF10: 7
PAINLEVEL_OUTOF10: 7

## 2022-04-09 NOTE — PROGRESS NOTES
[facility] General Surgery Progress Note    Chief Complaint:  Doing great! POD # 1    S: Mrs. Dominick Cullen is doing well. She is dressed and in the chair. She has no pain. O:   BP (!) 143/67   Pulse 65   Temp 96.4 °F (35.8 °C) (Temporal)   Resp 20   Ht 5' 3\" (1.6 m)   Wt 243 lb (110.2 kg)   SpO2 90%   BMI 43.05 kg/m²   I/O reviewed and appropriate  CONSTITUTIONAL: Alert, appropriate, no acute distress  SKIN: warm, dry with no rashes or lesions  HEENT: NCAT, Non icteric, PERR. Trachea Midline. CHEST/LUNGS: CTA bilaterally. Normal respiratory effort. Incisions: Clean, dry, and intact with no erythema or induration  CARDIOVASCULAR: RRR, No edema. ABDOMEN: soft, ND, appropriately TTP, +BS  NEUROLOGIC: CN II-XI grossly intact, no motor or sensory deficits   MUSCULOSKELETAL: No clubbing or cyanosis. PSYCHIATRIC:  Normal Mood and Affect. Alert and Oriented. LABS:   CBC: No results for input(s): WBC, RBC, HGB, HCT, PLT, SEGSPCT, BANDSPCT, BLASTSPCT, METASPCT, LYMPHOPCT, PROMYELOPCT, MONOPCT, MYELOPCT, EOSPCT, BASOPCT, MONOSABS, LYMPHSABS, EOSABS, BASOSABS, DIFFTYPE in the last 72 hours. Invalid input(s): ATYLMREL   BMP:   Recent Labs     04/08/22  1109   GLUCOSE 154*     LFT: No results for input(s): PROT, LABALBU, BILITOT, ALKPHOS, ALT, AST in the last 72 hours. A: Principal Problem:    Invasive lobular carcinoma of breast in female [facility])  Active Problems:    Infiltrating lobular carcinoma of breast in female [facility])  Resolved Problems:    * No resolved hospital problems. *      P:   Drain teaching. Discharge home today.        Tyree De Paz DO   Electronically Signed on 4/9/2022 at 10:49 AM

## 2022-04-09 NOTE — DISCHARGE SUMMARY
Physician Discharge Summary     Patient ID:  Washington Guzmán  [id number]  89 y.o.  1965. Admit date: 4/8/2022    Discharge date and time: 4/9/2022 12:00 PM     Admitting Physician: Sumi Falcon DO    Discharge Physician: Isaac Kieth DO     Admission Diagnoses: Malignant neoplasm of right female breast, unspecified estrogen receptor status, unspecified site of breast ([facility] [C50.911]  Invasive lobular carcinoma of breast in female [facility]) [C50.919]    Discharge Diagnoses: Same    Admission Condition: good    Discharged Condition: good    Indication for Admission: surgery    Hospital Course: Mrs Christal Tate presented for mastectomy and decided to undergo a prophylactic left mastectomy. She underwent those surgeries and tolerated well. She was observed for pain and having none, was discharged. Consults: none    Significant Diagnostic Studies: See Chart2    Treatments: IV hydration, analgesia: acetaminophen and surgery: Right Simple Mastectomy and Right SLNBx. Left Simple Prophylactic Mastectomy    Discharge Exam:  BP (!) 143/67   Pulse 65   Temp 96.4 °F (35.8 °C) (Temporal)   Resp 20   Ht 5' 3\" (1.6 m)   Wt 243 lb (110.2 kg)   SpO2 90%   BMI 43.05 kg/m²   General appearance: alert, appears stated age and cooperative  Head: Normocephalic, without obvious abnormality, atraumatic  Lungs: clear to auscultation bilaterally  Breasts: bilateral incisions are clean, dry, intact and JPs with serosanguinous discharge.    Heart: regular rate and rhythm, S1, S2 normal, no murmur, click, rub or gallop    Disposition: home    In process/preliminary results:  Outstanding Order Results     Date and Time Order Name Status Description    4/8/2022 12:57 PM NM INJ LYMPHOSCINTIGRAM In process           Patient Instructions:   Current Discharge Medication List      START taking these medications    Details   oxyCODONE (ROXICODONE) 5 MG immediate release tablet Take 1 tablet by mouth every 4 hours as needed for Pain for up to 3 days. Qty: 12 tablet, Refills: 0    Comments: Reduce doses taken as pain becomes manageable  Associated Diagnoses: Infiltrating lobular carcinoma of breast in female ([address]         CONTINUE these medications which have NOT CHANGED    Details   Golimumab ([address] ARIA IV) Infuse 1 Dose intravenously Every 8 weeks; opit      letrozole (FEMARA) 2.5 MG tablet Take 1 tablet by mouth daily  Qty: 30 tablet, Refills: 3    Associated Diagnoses: Infiltrating lobular carcinoma of breast in female [facility]); Carcinoma of right breast, estrogen and progesterone receptor positive (HCC)      oxybutynin (DITROPAN) 5 MG tablet Take 1 tablet by mouth 2 times daily  Qty: 60 tablet, Refills: 5    Associated Diagnoses: Hot flashes due to menopause      aspirin 81 MG EC tablet Take 81 mg by mouth daily      meloxicam (MOBIC) 15 MG tablet Take 15 mg by mouth daily      methotrexate (RHEUMATREX) 2.5 MG chemo tablet Take 7.5 mg by mouth once a week Was 20mg but pt taking zero for two weeks and then decreasing to 7.5mg      !! levothyroxine (SYNTHROID) 25 MCG tablet Take 25 mcg by mouth Daily       !! EUTHYROX 200 MCG tablet Take 200 mcg by mouth Daily       metFORMIN (GLUCOPHAGE) 500 MG tablet Take 500 mg by mouth 2 times daily (with meals)       Fiber POWD Take 1 Dose by mouth every other day       omeprazole (PRILOSEC) 20 MG delayed release capsule Take 20 mg by mouth Daily       folic acid (FOLVITE) 1 MG tablet Take 1 mg by mouth daily       Multiple Vitamins-Minerals (THERA M PLUS) TABS Take 1 tablet by mouth daily      zinc gluconate 50 MG tablet Take 50 mg by mouth daily       ! ! - Potential duplicate medications found. Please discuss with provider. Activity: activity as tolerated, no lifting, Driving, or Strenuous exercise for one month and no heavy lifting, pushing, pulling with the surgical side for 2 months  Diet: regular diet  Wound Care: as directed    Follow-up with Dr. Agustín Ward in 1 week.     Signed:  Dory Byrd DO 4/10/2022  2:11 PM

## 2022-04-09 NOTE — PROGRESS NOTES
OLIVIA drain teaching reviewed with patient. She was able to successfully empty and reset drains. She was able to milk the tubing. She feels comfortable caring for her drains at home.

## 2022-04-09 NOTE — DISCHARGE INSTR - ACTIVITY
No heavy lifting. No showering while the drain remains in place. Keep drain dressing clean and dry. Keep fluffs/gauze in the surgical bra. Wear the surgical bra tightly and at all times. Record drain output three times a day and as needed. Strip drains when emptying them. Do not soak in tub. Do not drive while on pain medications. Avoid constipation. Take colace nightly (up to 300 mg) and miralax daily (up to three doses). Drink 64 ounces of water and take a powdered fiber supplement daily (ie-Metamucil). If you have signs of infection, call you doctor.  These include:   -Increased pain, swelling, warmth, or redness  -Red streaks leading from the incision  -Pus draining from the incision  -A fever > 100.4

## 2022-04-11 ENCOUNTER — TELEPHONE (OUTPATIENT)
Dept: OTHER | Age: 57
End: 2022-04-11

## 2022-04-11 NOTE — TELEPHONE ENCOUNTER
Pt states she is doing well. She is keeping up her drain totals and will bring them into the office. Post op appt reviewed. Will f/u after her visit.

## 2022-04-14 ENCOUNTER — OFFICE VISIT (OUTPATIENT)
Dept: SURGERY | Age: 57
End: 2022-04-14

## 2022-04-14 VITALS
TEMPERATURE: 98.1 F | DIASTOLIC BLOOD PRESSURE: 88 MMHG | OXYGEN SATURATION: 98 % | SYSTOLIC BLOOD PRESSURE: 138 MMHG | BODY MASS INDEX: 40.22 KG/M2 | HEART RATE: 87 BPM | HEIGHT: 63 IN | WEIGHT: 227 LBS

## 2022-04-14 DIAGNOSIS — Z09 POSTOPERATIVE EXAMINATION: Primary | ICD-10-CM

## 2022-04-14 PROCEDURE — 99024 POSTOP FOLLOW-UP VISIT: CPT | Performed by: SURGERY

## 2022-04-14 NOTE — PROGRESS NOTES
Postop Progress Note    Subjective    Irene Calderon presents to the office for postop follow up one week s/p bilateral mastectomy with right sentinel lymph node biopsy. She brings in her drain outputs. She notes her pain is well controlled. Objective    Vitals:    04/14/22 1112   BP: 138/88   Pulse: 87   Temp: 98.1 °F (36.7 °C)   SpO2: 98%     General: alert, cooperative and no distress  Incision: healing well. Jps are serosanguinous. Pathology   A.  Breast, right mastectomy:   1.  Infiltrating lobular carcinoma, grade 1.   2.  Infiltrating carcinoma measures 3.2 cm in greatest dimension grossly. 3.  Infiltrating carcinoma is 2.0 cm from the nearest deep surgical   excision margin. 4.  Changes consistent with fibrocystic mastopathy involving   nonneoplastic breast parenchyma. 5.  Sections of nipple, negative for evidence of malignancy. Yarely Goins, excision of right breast inferolateral skin: Benign skin. Keiry Wilson, left mastectomy: Benign breast parenchyma with changes consistent with fibrocystic mastopathy. 2.  Sections of nipple and skin, negative for evidence of malignancy. D.  Lymph node, right sentinel lymph node biopsy:   1.  2 out of 3 lymph nodes, positive for metastatic lobular carcinoma. 2.  Largest metastatic focus measures 0.7 cm in greatest dimension. 3.  Indeterminate for extracapsular spread. AJCC STAGE: pT2, (sn)pN1a, pMx   Assessment  Doing well postoperatively. Plan  1. Continue any current medications  2. Wound care discussed. Continue to record drains. Drain number 2 removed today. Reviewed pathology. Will need XRT to right axilla given positive LN. Reviewed genetic testing. Will speak with Amina, CHEK2 positive. 3. Pt is to increase activities as tolerated. 4. Usual diet  5. Follow up: 1 week.     Electronically signed by Aaron Gama DO on 6/33/2552 at 11:22 AM

## 2022-04-17 ENCOUNTER — TELEPHONE (OUTPATIENT)
Dept: OTHER | Age: 57
End: 2022-04-17

## 2022-04-17 NOTE — TELEPHONE ENCOUNTER
Pt called and left a message. She states she has not had any drainage out of her OLIVIA drain since this am and she is supposed to notify someone if drainage abruptly stops. Messaged patient back 4/17/2022 @ 7873 and told her to try and \"strip\" the tubing on the drain to see if there is a clog. If nothing comes out, it is okay.

## 2022-04-18 NOTE — PROGRESS NOTES
MEDICAL ONCOLOGY PROGRESS NOTE    Pt Name: Gemma Simmonds  MRN: [id number]  YOB: 1965  Date of evaluation: 4/21/2022  History Obtained From:  patient and old medical records    HISTORY OF PRESENT ILLNESS:    The patient is a 56years old female who has been diagnosed with infiltrative lobular carcinoma of the right breast in February 2022. She was started on letrozole. She underwent a bilateral mastectomy on 4/8/2022. She also had sisi sampling of the left axilla. Unfortunately, she had sisi spread of her invasive lobular carcinoma. We discussed today about adjuvant chemotherapy. We discussed about Oncotype DX for further decision regarding treatment. We also discussed about the need for adjuvant radiation. She is healing well from surgery. Diagnosis  · Infiltrating lobular carcinoma, right breast, Feb 2022  · Favor grade 2  · ER 65%, DE 65%, HER-2 negative, Ki67 8%  · [id number]  · CHEK-2 mutation, April 2022    Treatment Summary  · 3/3/22 Initiate endocrine hormone therapy with Letrozole 2.5mg daily   · 4/8/2022 Bilateral Mastectomy  · Anticipate chemotherapy  · Anticipate radiation therapy    Cancer History  Chela [facility] was first seen by me on 3/3/2022. The patient was referred by Dr. Rosita Sheridan for diagnosis of breast cancer. The patient noticed an abnormality in the skin of the right breast.  She missed mammogram in 2021 due to COVID-19. She had mammogram in 2020. · 12/9/21 Bilateral mammogram [facility]): The breast tissue is heterogeneously dense. This may lower the sensitivity of mammography. There is heterogeneous increased density with distortion suggested in the upper outer right breast 9-10 o'clock. On the right MLO slab 8/18 there is a possible 7mm nodule toward 9-10 o'clock deep in the breast. No new abnormal density is noted in the left breast. There are no suspicious appearing clustered microcalcifications or skin thickening.   · 12/9/21 US breast [facility]): No lesion noted in the left breast. In the right breast there is a heterogeneous process suggesting a mass at 10 o'clock. This measures 2.5 x 2.2 x 3.3cm. There is shadowing from this process. There is blood flow suggested in the process. The possible small nodule noted in the right breast on the MLO projection, on the mammogram is not identified on ultrasound. · 2/17/22 Breast, right breast needle core biopsies at 10 o'clock position:  Infiltrating lobular carcinoma, favor grade 2. Invasive carcinoma measures 0.9 cm in greatest linear dimension and is present in multiple cores. ER 65%, TX 65%, HER-2 negative, Ki67 8%. · 3/3/22 MRI BREAST BILATERAL W WO CONTRAST: In the right breast around 10:00 in the mid depth region, there is residual enhancing mass. There is a marker clip in this area related to prior biopsy. The area of enhancement measures 4.3 x 1.7 x 2.8 cm. There is medium to rapid initial enhancement. The delayed enhancement demonstrates a mixture of type I and type II kinetics. There is a separate nodule in the right breast located close to the biopsied lesion. This measures about 1 x 0.7 x 1.2 cm and is located at 12-1:00 in the mid depth region. There is similar enhancement of this nodule with type I--type 2 kinetics. LEFT BREAST: There is a small nodule measuring 6 mm located anteriorly and fairly close to the nipple at the 9:00 location demonstrating persistent type I kinetics. The nodule is smoothly marginated. LYMPH NODES: There are no suspicious appearing lymph nodes in either axilla. .  No suspicious-appearing lymph nodes. ADDENDUM: The patient has an additional skin lesion on the medial side of the left breast around the 10:00 o'clock location. This measures about 1.3 x 1.3 cm and enhances. Direct examination of the skin lesion is recommended. The margins of the enhancement are somewhat ill-defined. · 3/3/22 Initiate endocrine hormone therapy with Letrozole 2.5mg daily.   Recommended to proceed with surgery. ·  2022 Bilateral Mastectomy:  Infiltrating lobular carcinoma, grade 1.  Infiltrating carcinoma measures 3.2 cm in greatest dimension grossly.   Infiltrating carcinoma is 2.0 cm from the nearest deep surgical excision margin.   Changes consistent with fibrocystic mastopathy involving nonneoplastic breast parenchyma.  Sections of nipple, negative for evidence of malignancy.  Skin, excision of right breast inferolateral skin: Benign skin.  Breast, left mastectomy: Benign breast parenchyma with changes consistent with fibrocystic mastopathy.  Sections of nipple and skin, negative for evidence of malignancy. Lymph node, right sentinel lymph node biopsy:  2 out of 3 lymph nodes, positive for metastatic lobular carcinoma.  Largest metastatic focus measures 0.7 cm in greatest dimension. Indeterminate for extracapsular spread  · 22-essentially, node positive invasive lobular carcinoma.   Recommended Oncotype DX      Past Medical History:    Past Medical History:   Diagnosis Date    Cancer ([facility]     breast    COVID-19 2022    cold symptoms with diarrhea    Diabetes mellitus ([facility]     GERD (gastroesophageal reflux disease)     Hypertension     mild; no antihypertensives; just aspirin    Hypothyroidism    · Breast cancer, right breast    Past Surgical History:    Past Surgical History:   Procedure Laterality Date    BREAST SURGERY       SECTION      CHOLECYSTECTOMY      DILATION AND CURETTAGE OF UTERUS      DILATION AND CURETTAGE OF UTERUS  1991    HYSTERECTOMY, TOTAL ABDOMINAL      MASTECTOMY Bilateral 2022    Bilateral SIMPLE MASTECTOMY, Right SENTINEL LYMPH NODY BIOPSY, Bilateral PEC BLOCK performed by Tobias Costello DO at [facility] [address] RIGHT Right 2022     BREAST NEEDLE BIOPSY RIGHT 2022 [facility] [address]       Social History:    Marital status:  Smoking status:Former; Quit   ETOH status:Former; Quit   Resides: [place]    Family History:   Family History   Problem Relation Age of Onset    Cancer Mother         Throat and rectal    Breast Cancer Maternal Aunt     Cancer Maternal Aunt         Pancreatic       Current Hospital Medications:    Current Outpatient Medications   Medication Sig Dispense Refill    Golimumab (SIMPONI ARIA IV) Infuse 1 Dose intravenously Every 8 weeks; opit      letrozole (FEMARA) 2.5 MG tablet Take 1 tablet by mouth daily 30 tablet 3    oxybutynin (DITROPAN) 5 MG tablet Take 1 tablet by mouth 2 times daily 60 tablet 5    aspirin 81 MG EC tablet Take 81 mg by mouth daily      meloxicam (MOBIC) 15 MG tablet Take 15 mg by mouth daily      methotrexate (RHEUMATREX) 2.5 MG chemo tablet Take 7.5 mg by mouth once a week Was 20mg but pt taking zero for two weeks and then decreasing to 7.5mg      levothyroxine (SYNTHROID) 25 MCG tablet Take 25 mcg by mouth Daily       EUTHYROX 200 MCG tablet Take 200 mcg by mouth Daily       metFORMIN (GLUCOPHAGE) 500 MG tablet Take 500 mg by mouth 2 times daily (with meals)       Fiber POWD Take 1 Dose by mouth every other day       omeprazole (PRILOSEC) 20 MG delayed release capsule Take 20 mg by mouth Daily       folic acid (FOLVITE) 1 MG tablet Take 1 mg by mouth daily       Multiple Vitamins-Minerals (THERA M PLUS) TABS Take 1 tablet by mouth daily      zinc gluconate 50 MG tablet Take 50 mg by mouth daily       No current facility-administered medications for this visit. Allergies:    Allergies   Allergen Reactions    Barium-Containing Compounds Shortness Of Breath and Anaphylaxis    Sulfa Antibiotics Itching and Rash         Subjective   REVIEW OF SYSTEMS:   CONSTITUTIONAL: no fever, no night sweats, weakness, fatigue;  HEENT: no blurring of vision, no double vision, no hearing difficulty, no tinnitus, no ulceration, no dysplasia, no epistaxis;   LUNGS: no cough, no hemoptysis, no wheeze,  no shortness of breath;  CARDIOVASCULAR: no palpitation, no chest pain, no shortness of breath;  GI: no abdominal pain, no nausea, no vomiting, no diarrhea, no constipation;  MARINA: no dysuria, no hematuria, no frequency or urgency, no nephrolithiasis;  MUSCULOSKELETAL: RA pain,no joint pain, no swelling, no stiffness;  ENDOCRINE: no polyuria, no polydipsia, no cold or heat intolerance;  HEMATOLOGY: no easy bruising or bleeding, no history of clotting disorder;  DERMATOLOGY: no skin rash, no eczema, no pruritus;  PSYCHIATRY: no depression, no anxiety, no panic attacks, no suicidal ideation, no homicidal ideation;  NEUROLOGY: no syncope, no seizures, no numbness or tingling of hands, no numbness or tingling of feet,neuropathy in right toe, no paresis;     Objective   BP (!) 120/100   Pulse 90   Ht 5' 3\" (1.6 m)   Wt 230 lb 4.8 oz (104.5 kg)   SpO2 98%   BMI 40.80 kg/m²     PHYSICAL EXAM:  CONSTITUTIONAL: Alert, appropriate, no acute distress  EYES: Non icteric, EOM intact, pupils equal round   ENT: Mucus membranes moist, no oral pharyngeal lesions, external inspection of ears and nose are normal  NECK: Supple, no masses. No palpable thyroid mass  CHEST/LUNGS: CTA bilaterally, normal respiratory effort   BREAST: Chaperoned breast exam with Tamra Stewart RN  CARDIOVASCULAR: RRR, no murmurs. No lower extremity edema  ABDOMEN: soft non-tender, active bowel sounds, no HSM. No palpable masses  EXTREMITIES: warm, full ROM in all 4 extremities, no focal weakness. SKIN: warm, dry with no rashes or lesions  LYMPH: No cervical, clavicular, axillary, or inguinal lymphadenopathy  NEUROLOGIC: follows commands, non focal   PSYCH: mood and affect appropriate.   Alert and oriented to time, place, person      LABORATORY RESULTS REVIEWED/ANALYZED BY ME:   4/8/2022 Bilateral Mastectomy  Infiltrating lobular carcinoma, grade 1.  Infiltrating carcinoma measures 3.2 cm in greatest dimension grossly.   Infiltrating carcinoma is 2.0 cm from the nearest deep surgical excision margin.   Changes consistent with fibrocystic mastopathy involving nonneoplastic breast parenchyma.  Sections of nipple, negative for evidence of malignancy.  Skin, excision of right breast inferolateral skin: Benign skin.  Breast, left mastectomy: Benign breast parenchyma with changes consistent with fibrocystic mastopathy.  Sections of nipple and skin, negative for evidence of malignancy. Lymph node, right sentinel lymph node biopsy:  2 out of 3 lymph nodes, positive for metastatic lobular carcinoma.  Largest metastatic focus measures 0.7 cm in greatest dimension. Indeterminate for extracapsular spread    4/21/2022 CBC  WBC 11.3  HGB 13.0    Nut 7.4    RADIOLOGY STUDIES REVIEWED BY ME:  None    ASSESSMENT:    No orders of the defined types were placed in this encounter. Avelina Brooks was seen today for follow-up. Diagnoses and all orders for this visit:    Infiltrating lobular carcinoma of breast in female [facility])    Carcinoma of right breast, estrogen and progesterone receptor positive ([facility] [address]    Care plan discussed with patient       ILC ER 65%, WV 65%, HER-2 negative, Ki67 8%, [id number]  -S/p B/L mastectomy/LN sampling  Pathology:ILC G1, 3.2 cm, 2/3 LN (+)  -Letrozole since 3/3/2022  -Discussed NCCN guidelines. Recommend Oncotype DX    -4/21/2022-Oncotype DX sent on 4/21/2022  -    AI related side effects-hot flashes  -oxybutynin for hot flashes     Postmenopausal status-started on letrozole  -Status post HTA/SOB    CHEK_2 mutation. Strong family history of breast cancer-referred for genetic test  Mary comprehensive genetic test      Hypertension-follow-up with primary care physician. Controlled today.   BP (!) 120/100   Pulse 90   Ht 5' 3\" (1.6 m)   Wt 230 lb 4.8 oz (104.5 kg)   SpO2 98%   BMI 40.80 kg/m²       PLAN:  · RTC with MD 4 weeks VV  · Continue Letrozole 2.5mg daily  · Continue Oxybutynin 5mg twice a day  · Recommend genetic testing with Alexusmarilyn Barron, IR[facility] or Angie Mendosa, Request Oncotype DX on pathology  · Continue follow-up with Dr. Dory Byrd for surgery  · Referral to radiation oncology for consideration of adjuvant radiation (positive note)  · We will recommend to proceed with colonoscopy as well after completion of radiation    Nba BACH am pre charting  as Medical Assistant for Cassidy Alonso MD. Electronically signed by Nba Sun MA on 4/21/2022 at 3:09 PM CDT. Cindy Shi am scribing as Medical Assistant for Cassidy Alonso MD. Electronically signed by Nba Sun MA on 4/21/2022 at 11:36 AM CDT. I, Dr Kayce Ng, personally performed the services described in this documentation as scribed by Nba Sun MA in my presence and is both accurate and complete. I have seen, examined and reviewed this patient medication list, appropriate labs and imaging studies. I reviewed relevant medical records and others physicians notes. I discussed the plans of care with the patient. I answered all the questions to the patients satisfaction. I have also reviewed the chief complaint (CC) and part of the history (History of Present Illness (HPI), Past Family Social History [facility]), or Review of Systems (ROS) and made changes when appropriated. (Please note that portions of this note were completed with a voice recognition program. Efforts were made to edit the dictations but occasionally words are mis-transcribed. )Electronically signed by Cassidy Alonso MD on 4/21/2022 at 4:54 PM      The total time (35min) I spent to see the patient today includes at least one or more of the following: preparing to see the patient by reviewing prior tests, prior notes or other relevant information, performing appropriate independent examination and evaluation, counseling, ordering of  Tests,  referrals, communicating with other healthcare professionals when appropriated to coordinate care, documenting clinic information in the electronic medical record or other health records, independently interpreting results of tests, managing test results and communicating the results to the patient/family or caregiver.

## 2022-04-18 NOTE — TELEPHONE ENCOUNTER
I just s/w patient & offered her a sooner appt-4/20/22 @ 10am w/Rich. Patient said \"no if its not life or death ill just keep my appointment for Thursday\". I said ok so you want to keep that appt then? She said yes. I told her ok we will see her then.

## 2022-04-18 NOTE — TELEPHONE ENCOUNTER
Pt called back and stated she stripped her tubing and nothing is coming out. Pt does not have her post op appointment scheduled until Thursday and wanting OLIVIA drain removed sooner. Will check with Dr. Rj Medrano to see if she can be rescheduled.

## 2022-04-18 NOTE — TELEPHONE ENCOUNTER
I am not in the office tomorrow, nor is Rich. If he doesn't care, she can see him Tuesday. Continue to strip it and wear her surgical bra tight.

## 2022-04-19 ENCOUNTER — TELEPHONE (OUTPATIENT)
Dept: OTHER | Age: 57
End: 2022-04-19

## 2022-04-19 NOTE — TELEPHONE ENCOUNTER
Pt messaged and said she just \"pulled\" out a good 6 inch clot from her drain tube and 40 mLs of drainage came out.

## 2022-04-20 ENCOUNTER — TELEPHONE (OUTPATIENT)
Dept: OTHER | Age: 57
End: 2022-04-20

## 2022-04-20 DIAGNOSIS — C50.919 INFILTRATING LOBULAR CARCINOMA OF BREAST IN FEMALE (HCC): Primary | ICD-10-CM

## 2022-04-20 NOTE — TELEPHONE ENCOUNTER
Pt messaged asking if she needs blood work prior to her post op visit. Responded back that there is no orders or documentation of blood work needed.

## 2022-04-21 ENCOUNTER — OFFICE VISIT (OUTPATIENT)
Dept: HEMATOLOGY | Age: 57
End: 2022-04-21
Payer: COMMERCIAL

## 2022-04-21 ENCOUNTER — HOSPITAL ENCOUNTER (OUTPATIENT)
Dept: INFUSION THERAPY | Age: 57
Discharge: HOME | End: 2022-04-21
Payer: COMMERCIAL

## 2022-04-21 ENCOUNTER — OFFICE VISIT (OUTPATIENT)
Dept: SURGERY | Age: 57
End: 2022-04-21

## 2022-04-21 VITALS
SYSTOLIC BLOOD PRESSURE: 120 MMHG | DIASTOLIC BLOOD PRESSURE: 100 MMHG | OXYGEN SATURATION: 98 % | HEART RATE: 90 BPM | BODY MASS INDEX: 40.8 KG/M2 | HEIGHT: 63 IN | WEIGHT: 230.3 LBS

## 2022-04-21 DIAGNOSIS — C50.919 INFILTRATING LOBULAR CARCINOMA OF BREAST IN FEMALE (HCC): Primary | ICD-10-CM

## 2022-04-21 DIAGNOSIS — C50.911 CARCINOMA OF RIGHT BREAST, ESTROGEN AND PROGESTERONE RECEPTOR POSITIVE (HCC): ICD-10-CM

## 2022-04-21 DIAGNOSIS — C50.919 INFILTRATING LOBULAR CARCINOMA OF BREAST IN FEMALE (HCC): ICD-10-CM

## 2022-04-21 DIAGNOSIS — Z17.0 CARCINOMA OF RIGHT BREAST, ESTROGEN AND PROGESTERONE RECEPTOR POSITIVE (HCC): ICD-10-CM

## 2022-04-21 DIAGNOSIS — Z90.13 STATUS POST BILATERAL MASTECTOMY: Primary | ICD-10-CM

## 2022-04-21 DIAGNOSIS — Z71.89 CARE PLAN DISCUSSED WITH PATIENT: ICD-10-CM

## 2022-04-21 LAB
HCT VFR BLD CALC: 40.3 % (ref 34.1–44.9)
HEMOGLOBIN: 13 G/DL (ref 11.2–15.7)
MCH RBC QN AUTO: 31.4 PG (ref 25.6–32.2)
MCHC RBC AUTO-ENTMCNC: 32.3 G/DL (ref 32.3–35.5)
MCV RBC AUTO: 97.3 FL (ref 79.4–94.8)
PDW BLD-RTO: 13.3 % (ref 11.7–14.4)
PLATELET # BLD: 176 K/UL (ref 182–369)
PMV BLD AUTO: 10.8 FL (ref 7.4–10.4)
RBC # BLD: 4.14 M/UL (ref 3.93–5.22)
WBC # BLD: 11.3 K/UL (ref 3.98–10.04)

## 2022-04-21 PROCEDURE — 85027 COMPLETE CBC AUTOMATED: CPT

## 2022-04-21 PROCEDURE — 99024 POSTOP FOLLOW-UP VISIT: CPT | Performed by: PHYSICIAN ASSISTANT

## 2022-04-21 PROCEDURE — 99212 OFFICE O/P EST SF 10 MIN: CPT

## 2022-04-21 PROCEDURE — 99214 OFFICE O/P EST MOD 30 MIN: CPT | Performed by: INTERNAL MEDICINE

## 2022-04-28 ENCOUNTER — OFFICE VISIT (OUTPATIENT)
Dept: SURGERY | Age: 57
End: 2022-04-28

## 2022-04-28 ENCOUNTER — TRANSCRIBE ORDERS (OUTPATIENT)
Dept: ADMINISTRATIVE | Facility: HOSPITAL | Age: 57
End: 2022-04-28

## 2022-04-28 ENCOUNTER — TELEPHONE (OUTPATIENT)
Dept: RADIATION ONCOLOGY | Facility: HOSPITAL | Age: 57
End: 2022-04-28

## 2022-04-28 ENCOUNTER — TELEPHONE (OUTPATIENT)
Dept: OTHER | Age: 57
End: 2022-04-28

## 2022-04-28 VITALS
DIASTOLIC BLOOD PRESSURE: 80 MMHG | WEIGHT: 232.4 LBS | SYSTOLIC BLOOD PRESSURE: 138 MMHG | TEMPERATURE: 98 F | HEIGHT: 64 IN | BODY MASS INDEX: 39.67 KG/M2

## 2022-04-28 DIAGNOSIS — Z09 POSTOPERATIVE EXAMINATION: Primary | ICD-10-CM

## 2022-04-28 DIAGNOSIS — R74.8 ABNORMAL LIVER ENZYMES: Primary | ICD-10-CM

## 2022-04-28 PROCEDURE — 99024 POSTOP FOLLOW-UP VISIT: CPT | Performed by: SURGERY

## 2022-04-28 NOTE — TELEPHONE ENCOUNTER
Pt states she just got a call from radiation oncology at [facility] to schedule an appointment and she was confused because she has a f/u appt with Dr. Ria Severin at the end of the month. Informed patient this is an appointment for consideration radiation not to start. Informed her that the two doctors with consult each other and then her and Dr. Ria Severin will meet and discuss further treatment. Pt verbalized understanding. Pt has another infusion on 5/6/2022 and informed her that she can proceed with that since Dr. Kimberly Gaston said the effect last around 4 weeks and her appt is not until end of May and nothing will be started until June if they decide she needs further treatment. Pt states she is doing well and following the books recommendations and performing exercises. Encouraged patient to continue the good work and let me know how I can help.

## 2022-04-28 NOTE — PROGRESS NOTES
Postop Progress Note    Subjective    Arabella Felt presents to the office for postop follow up 3 weeks s/p bilateral mastectomy with right sentinel lymph node biopsy. She has done well. All drains removed. No pain. Objective    Vitals:    04/28/22 1340   BP: 138/80   Temp: 98 °F (36.7 °C)     General: alert, cooperative and no distress  Incision: healing well    Assessment  Doing well postoperatively. Plan  Continue with xrt and oncology f/u. May return to work with 15 pound weight lifting restriction for the next month.      Follow up: 3 months  Electronically signed by DO Franklin on 4/82/3963 at 1:57 PM

## 2022-04-29 PROBLEM — C50.919 LOBULAR CARCINOMA OF BREAST (HCC): Status: ACTIVE | Noted: 2022-02-21

## 2022-05-04 PROBLEM — Z87.891 FORMER SMOKER: Status: ACTIVE | Noted: 2022-05-04

## 2022-05-04 PROBLEM — Z90.13 S/P BILATERAL MASTECTOMY: Status: ACTIVE | Noted: 2022-05-04

## 2022-05-04 PROBLEM — Z98.890 S/P LYMPH NODE BIOPSY: Status: ACTIVE | Noted: 2022-05-04

## 2022-05-05 ENCOUNTER — HOSPITAL ENCOUNTER (OUTPATIENT)
Dept: RADIATION ONCOLOGY | Facility: HOSPITAL | Age: 57
Setting detail: RADIATION/ONCOLOGY SERIES
End: 2022-05-05

## 2022-05-06 ENCOUNTER — TELEPHONE (OUTPATIENT)
Dept: RADIATION ONCOLOGY | Facility: HOSPITAL | Age: 57
End: 2022-05-06

## 2022-05-09 NOTE — PROGRESS NOTES
Subjective  Marino Romeo is a 61-year-old female who presents status post bilateral simple mastectomy with right sentinel lymph node biopsy performed by Dr. [employer]. She is doing well. She has serosanguineous output from her Jack-Regalado drains. The output from her drains is decreasing. She has no fevers and no wound problems identified. Objective  Patient Active Problem List    Diagnosis Date Noted    Invasive lobular carcinoma of breast in female [facility]) 04/09/2022    Infiltrating lobular carcinoma of breast in female [facility]) 02/21/2022       Current Outpatient Medications   Medication Sig Dispense Refill    Golimumab (SIMPONI ARIA IV) Infuse 1 Dose intravenously Every 8 weeks; opit      letrozole (FEMARA) 2.5 MG tablet Take 1 tablet by mouth daily 30 tablet 3    oxybutynin (DITROPAN) 5 MG tablet Take 1 tablet by mouth 2 times daily 60 tablet 5    aspirin 81 MG EC tablet Take 81 mg by mouth daily      meloxicam (MOBIC) 15 MG tablet Take 15 mg by mouth daily      methotrexate (RHEUMATREX) 2.5 MG chemo tablet Take 7.5 mg by mouth once a week Was 20mg but pt taking zero for two weeks and then decreasing to 7.5mg      levothyroxine (SYNTHROID) 25 MCG tablet Take 25 mcg by mouth Daily       EUTHYROX 200 MCG tablet Take 200 mcg by mouth Daily       metFORMIN (GLUCOPHAGE) 500 MG tablet Take 500 mg by mouth 2 times daily (with meals)       Fiber POWD Take 1 Dose by mouth every other day       omeprazole (PRILOSEC) 20 MG delayed release capsule Take 20 mg by mouth Daily       folic acid (FOLVITE) 1 MG tablet Take 1 mg by mouth daily       Multiple Vitamins-Minerals (THERA M PLUS) TABS Take 1 tablet by mouth daily      zinc gluconate 50 MG tablet Take 50 mg by mouth daily       No current facility-administered medications for this visit.        Allergies: Barium-containing compounds and Sulfa antibiotics    Past Medical History:   Diagnosis Date    Cancer [facility])     breast    COVID-19 2022    cold symptoms with diarrhea    Diabetes mellitus ([facility] [address]     GERD (gastroesophageal reflux disease)     Hypertension     mild; no antihypertensives; just aspirin    Hypothyroidism        Past Surgical History:   Procedure Laterality Date    BREAST SURGERY       SECTION      CHOLECYSTECTOMY      DILATION AND CURETTAGE OF UTERUS      DILATION AND CURETTAGE OF UTERUS  1991    HYSTERECTOMY, TOTAL ABDOMINAL      MASTECTOMY Bilateral 2022    Bilateral SIMPLE MASTECTOMY, Right SENTINEL LYMPH NODY BIOPSY, Bilateral PEC BLOCK performed by Erika Swift DO at [facility] [address] RIGHT Right 2022     BREAST NEEDLE BIOPSY RIGHT 2022 [facility] [address]       Family History   Problem Relation Age of Onset    Cancer Mother         Throat and rectal    Breast Cancer Maternal Aunt     Cancer Maternal Aunt         Pancreatic       Social History     Tobacco Use    Smoking status: Former Smoker    Smokeless tobacco: Never Used   Substance Use Topics    Alcohol use: Not Currently        Review of systems  Reviewed and positive for the above all other systems noted to be negative    Exam  On examination, her incisions are healing well. The Jack-Regalado drains were removed without incident. Assessment  Doing well status post bilateral mastectomy with right sentinel node lymph node biopsy    Plan  We will plan to see her back in the office next week with Dr. Iman Mary.

## 2022-05-12 ENCOUNTER — CONSULT (OUTPATIENT)
Dept: RADIATION ONCOLOGY | Facility: HOSPITAL | Age: 57
End: 2022-05-12

## 2022-05-12 ENCOUNTER — HOSPITAL ENCOUNTER (OUTPATIENT)
Dept: ULTRASOUND IMAGING | Facility: HOSPITAL | Age: 57
Discharge: HOME | End: 2022-05-12
Admitting: INTERNAL MEDICINE

## 2022-05-12 VITALS
DIASTOLIC BLOOD PRESSURE: 105 MMHG | SYSTOLIC BLOOD PRESSURE: 156 MMHG | WEIGHT: 234.6 LBS | HEART RATE: 102 BPM | BODY MASS INDEX: 40.27 KG/M2 | OXYGEN SATURATION: 94 %

## 2022-05-12 DIAGNOSIS — C50.919 LOBULAR CARCINOMA OF BREAST, UNSPECIFIED LATERALITY: Primary | ICD-10-CM

## 2022-05-12 DIAGNOSIS — Z90.13 S/P BILATERAL MASTECTOMY: ICD-10-CM

## 2022-05-12 DIAGNOSIS — Z98.890 S/P LYMPH NODE BIOPSY: ICD-10-CM

## 2022-05-12 PROCEDURE — G0463 HOSPITAL OUTPT CLINIC VISIT: HCPCS | Performed by: RADIOLOGY

## 2022-05-12 PROCEDURE — 76705 ECHO EXAM OF ABDOMEN: CPT

## 2022-05-12 RX ORDER — FOLIC ACID 1 MG/1
1000 TABLET ORAL DAILY
COMMUNITY
Start: 2022-04-26

## 2022-05-12 RX ORDER — ZINC GLUCONATE 50 MG
1 TABLET ORAL DAILY
COMMUNITY

## 2022-05-12 RX ORDER — LETROZOLE 2.5 MG/1
2.5 TABLET, FILM COATED ORAL DAILY
COMMUNITY
Start: 2022-04-29

## 2022-05-12 RX ORDER — METHOTREXATE 2.5 MG/1
20 TABLET ORAL WEEKLY
COMMUNITY
Start: 2022-04-26

## 2022-05-12 RX ORDER — OXYBUTYNIN CHLORIDE 5 MG/1
5 TABLET ORAL 2 TIMES DAILY
COMMUNITY
Start: 2022-04-29 | End: 2023-03-30

## 2022-05-19 ENCOUNTER — TELEPHONE (OUTPATIENT)
Dept: OTHER | Age: 57
End: 2022-05-19

## 2022-05-19 NOTE — TELEPHONE ENCOUNTER
Made patient aware and she states she is doing great. She is back at work and on a 15 lb weight restriction. She states her co workers have been helpful and supportive. Not other needs at this time.

## 2022-05-19 NOTE — TELEPHONE ENCOUNTER
Pt left message asking when she is able to wear her breast prosthetics. Surgery was 4/8/2022. Will check with Dr. Danielle Stallings.

## 2022-05-23 ENCOUNTER — TELEPHONE (OUTPATIENT)
Dept: OTHER | Age: 57
End: 2022-05-23

## 2022-05-23 NOTE — TELEPHONE ENCOUNTER
Pt states she was feeling some sharp pains in her right side like an ice pick. She just wanted to make sure it was normal.  Discussed nerve pain and patient is not wearing a bra. Pt to continue to monitor symptoms and if they continue or worsen, will get her in to see Dr. Samantha Jordan.

## 2022-05-26 ENCOUNTER — TELEPHONE (OUTPATIENT)
Dept: OTHER | Age: 57
End: 2022-05-26

## 2022-05-26 ENCOUNTER — TELEPHONE (OUTPATIENT)
Dept: HEMATOLOGY | Age: 57
End: 2022-05-26

## 2022-05-26 NOTE — TELEPHONE ENCOUNTER
Pt states her appointment has been rescheduled but she is unsure if the weight restriction has been lifted. Reviewed Dr. Roper Miss note and it says 15 lb weight limit for the next month and that was dated 4/28/2022. Pt will increase weight slowly and see how she feels. If she has pain, she is to stop what she is doing. Pt states pain she was having on her right side comes and goes. Pt asked about genetic testing for 5 of her family members and a list was given to [place] while she was out. Emigdio Degroot and will get back to patient to follow up. Mother of patient has not received kit. Lolita Cortés back and said she has the form and will place orders today. Pt made aware. If her family does not receive kits in the next week, she is to let me know.

## 2022-05-27 ENCOUNTER — TELEPHONE (OUTPATIENT)
Dept: OTHER | Age: 57
End: 2022-05-27

## 2022-05-27 NOTE — TELEPHONE ENCOUNTER
Pt called to let me know that 2 of her family members have been contacted by [address] [place] [address] [place]. Pt informed to let me if the others did not get the kits and I would make sure to get them sent out. Pt verbalized understanding.

## 2022-06-02 ENCOUNTER — TELEPHONE (OUTPATIENT)
Dept: SURGERY | Age: 57
End: 2022-06-02

## 2022-06-02 NOTE — TELEPHONE ENCOUNTER
6/2/2022 Patient called wanting to talk to Charlies Hashimoto about her Genetic testing.      Please call back at [phone]

## 2022-06-09 ENCOUNTER — TELEMEDICINE (OUTPATIENT)
Dept: HEMATOLOGY | Age: 57
End: 2022-06-09
Payer: COMMERCIAL

## 2022-06-09 ENCOUNTER — TELEPHONE (OUTPATIENT)
Dept: HEMATOLOGY | Age: 57
End: 2022-06-09

## 2022-06-09 DIAGNOSIS — Z71.89 CARE PLAN DISCUSSED WITH PATIENT: ICD-10-CM

## 2022-06-09 DIAGNOSIS — C50.919 INFILTRATING LOBULAR CARCINOMA OF BREAST IN FEMALE (HCC): Primary | ICD-10-CM

## 2022-06-09 DIAGNOSIS — Z71.89 COORDINATION OF COMPLEX CARE: ICD-10-CM

## 2022-06-09 DIAGNOSIS — Z79.811 AROMATASE INHIBITOR USE: ICD-10-CM

## 2022-06-09 PROCEDURE — 99442 PR PHYS/QHP TELEPHONE EVALUATION 11-20 MIN: CPT | Performed by: INTERNAL MEDICINE

## 2022-06-09 NOTE — TELEPHONE ENCOUNTER
Called patient to notify her of appointment of Bone Density on 6/16/22 at [id number] [facility]. Patient verbalized understanding.

## 2022-06-10 ENCOUNTER — DOCUMENTATION (OUTPATIENT)
Dept: RADIATION ONCOLOGY | Facility: HOSPITAL | Age: 57
End: 2022-06-10

## 2022-06-10 NOTE — PROGRESS NOTES
I received communication from Dr. Bryant that he does not anticipate chemotherapy for this patient.  Therefore we will schedule her to come in for simulation to the breast and regional lymph nodes with anticipated dose of 6040 cGy in 33 treatment fractions.

## 2022-06-10 NOTE — TELEPHONE ENCOUNTER
Spoke with pt. Her family member got an email from [facility] saying she would owe $175. I called Debbie Moon and they would not discuss a billing matter with me because the pt was not here. I let pt know I had a message in to our rep to get more info. I told her to tell her family member not to pay it for now because her testing should be free.

## 2022-06-16 ENCOUNTER — HOSPITAL ENCOUNTER (OUTPATIENT)
Dept: RADIATION ONCOLOGY | Facility: HOSPITAL | Age: 57
Setting detail: RADIATION/ONCOLOGY SERIES
Discharge: HOME | End: 2022-06-16

## 2022-06-16 ENCOUNTER — HOSPITAL ENCOUNTER (OUTPATIENT)
Dept: RADIATION ONCOLOGY | Facility: HOSPITAL | Age: 57
Setting detail: RADIATION/ONCOLOGY SERIES
End: 2022-06-16

## 2022-06-16 ENCOUNTER — HOSPITAL ENCOUNTER (OUTPATIENT)
Dept: WOMENS IMAGING | Age: 57
Discharge: HOME | End: 2022-06-16
Payer: COMMERCIAL

## 2022-06-16 DIAGNOSIS — Z79.811 AROMATASE INHIBITOR USE: ICD-10-CM

## 2022-06-16 DIAGNOSIS — C50.919 INFILTRATING LOBULAR CARCINOMA OF BREAST IN FEMALE (HCC): ICD-10-CM

## 2022-06-16 PROCEDURE — 77334 RADIATION TREATMENT AID(S): CPT | Performed by: RADIOLOGY

## 2022-06-16 PROCEDURE — 77080 DXA BONE DENSITY AXIAL: CPT | Performed by: RADIOLOGY

## 2022-06-16 PROCEDURE — 77290 THER RAD SIMULAJ FIELD CPLX: CPT | Performed by: RADIOLOGY

## 2022-06-16 PROCEDURE — 77080 DXA BONE DENSITY AXIAL: CPT

## 2022-06-17 DIAGNOSIS — Z17.0 CARCINOMA OF RIGHT BREAST, ESTROGEN AND PROGESTERONE RECEPTOR POSITIVE (HCC): ICD-10-CM

## 2022-06-17 DIAGNOSIS — C50.911 CARCINOMA OF RIGHT BREAST, ESTROGEN AND PROGESTERONE RECEPTOR POSITIVE (HCC): ICD-10-CM

## 2022-06-17 DIAGNOSIS — C50.919 INFILTRATING LOBULAR CARCINOMA OF BREAST IN FEMALE (HCC): ICD-10-CM

## 2022-06-20 RX ORDER — LETROZOLE 2.5 MG/1
TABLET, FILM COATED ORAL
Qty: 90 TABLET | Refills: 1 | Status: SHIPPED | OUTPATIENT
Start: 2022-06-20 | End: 2022-09-14

## 2022-06-28 PROCEDURE — 77334 RADIATION TREATMENT AID(S): CPT | Performed by: RADIOLOGY

## 2022-06-28 PROCEDURE — 77300 RADIATION THERAPY DOSE PLAN: CPT | Performed by: RADIOLOGY

## 2022-06-28 PROCEDURE — 77295 3-D RADIOTHERAPY PLAN: CPT | Performed by: RADIOLOGY

## 2022-07-01 ENCOUNTER — HOSPITAL ENCOUNTER (OUTPATIENT)
Dept: RADIATION ONCOLOGY | Facility: HOSPITAL | Age: 57
Setting detail: RADIATION/ONCOLOGY SERIES
End: 2022-07-01

## 2022-07-05 ENCOUNTER — DOCUMENTATION (OUTPATIENT)
Dept: RADIATION ONCOLOGY | Facility: HOSPITAL | Age: 57
End: 2022-07-05

## 2022-07-05 ENCOUNTER — HOSPITAL ENCOUNTER (OUTPATIENT)
Dept: RADIATION ONCOLOGY | Facility: HOSPITAL | Age: 57
Setting detail: RADIATION/ONCOLOGY SERIES
Discharge: HOME | End: 2022-07-05

## 2022-07-05 LAB
RAD ONC ARIA COURSE ID: NORMAL
RAD ONC ARIA COURSE LAST TREATMENT DATE: NORMAL
RAD ONC ARIA COURSE START DATE: NORMAL
RAD ONC ARIA COURSE TREATMENT ELAPSED DAYS: 0
RAD ONC ARIA FIRST TREATMENT DATE: NORMAL
RAD ONC ARIA PLAN FRACTIONS TREATED TO DATE: 1
RAD ONC ARIA PLAN ID: NORMAL
RAD ONC ARIA PLAN PRESCRIBED DOSE PER FRACTION: 0.31 GY
RAD ONC ARIA PLAN PRESCRIBED DOSE PER FRACTION: 1.8 GY
RAD ONC ARIA PLAN PRESCRIBED DOSE PER FRACTION: 1.8 GY
RAD ONC ARIA PLAN PRIMARY REFERENCE POINT: NORMAL
RAD ONC ARIA PLAN TOTAL FRACTIONS PRESCRIBED: 28
RAD ONC ARIA PLAN TOTAL PRESCRIBED DOSE: 5040 CGY
RAD ONC ARIA PLAN TOTAL PRESCRIBED DOSE: 5040 CGY
RAD ONC ARIA PLAN TOTAL PRESCRIBED DOSE: 866.8 CGY
RAD ONC ARIA REFERENCE POINT DOSAGE GIVEN TO DATE: 1.8 GY
RAD ONC ARIA REFERENCE POINT ID: NORMAL
RAD ONC ARIA REFERENCE POINT SESSION DOSAGE GIVEN: 1.8 GY

## 2022-07-05 PROCEDURE — 77412 RADIATION TX DELIVERY LVL 3: CPT | Performed by: RADIOLOGY

## 2022-07-05 PROCEDURE — 77280 THER RAD SIMULAJ FIELD SMPL: CPT | Performed by: RADIOLOGY

## 2022-07-05 NOTE — PROGRESS NOTES
CARY met with Mrs. Mir who is starting radiation treatment for infiltrating lobular carcinoma of breast in female. CARY introduced self and explained role and source of support. She is 57 years old and lives with her spouse. Her support system includes her family and work family. 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 She does have financial concerns regarding having to drive to treatment daily. CARY did screen her for gas assistance through the [employer] and she qualifies. CARY did explain how often she would receive gas assistance. She does not take any medication for anxiety/depression. She does have a nurse from her insurance that calls her a couple of times a month to check in with her. CARY will remain available.

## 2022-07-25 ENCOUNTER — HOSPITAL ENCOUNTER (OUTPATIENT)
Dept: RADIATION ONCOLOGY | Facility: HOSPITAL | Age: 57
Discharge: HOME | End: 2022-07-25

## 2022-07-25 LAB
RAD ONC ARIA COURSE ID: NORMAL
RAD ONC ARIA COURSE LAST TREATMENT DATE: NORMAL
RAD ONC ARIA COURSE START DATE: NORMAL
RAD ONC ARIA COURSE TREATMENT ELAPSED DAYS: 20
RAD ONC ARIA FIRST TREATMENT DATE: NORMAL
RAD ONC ARIA PLAN FRACTIONS TREATED TO DATE: 2
RAD ONC ARIA PLAN ID: NORMAL
RAD ONC ARIA PLAN PRESCRIBED DOSE PER FRACTION: 0.31 GY
RAD ONC ARIA PLAN PRESCRIBED DOSE PER FRACTION: 1.8 GY
RAD ONC ARIA PLAN PRESCRIBED DOSE PER FRACTION: 1.8 GY
RAD ONC ARIA PLAN PRIMARY REFERENCE POINT: NORMAL
RAD ONC ARIA PLAN TOTAL FRACTIONS PRESCRIBED: 28
RAD ONC ARIA PLAN TOTAL PRESCRIBED DOSE: 5040 CGY
RAD ONC ARIA PLAN TOTAL PRESCRIBED DOSE: 5040 CGY
RAD ONC ARIA PLAN TOTAL PRESCRIBED DOSE: 866.8 CGY
RAD ONC ARIA REFERENCE POINT DOSAGE GIVEN TO DATE: 3.6 GY
RAD ONC ARIA REFERENCE POINT ID: NORMAL
RAD ONC ARIA REFERENCE POINT SESSION DOSAGE GIVEN: 1.8 GY

## 2022-07-25 PROCEDURE — 77417 THER RADIOLOGY PORT IMAGE(S): CPT | Performed by: RADIOLOGY

## 2022-07-25 PROCEDURE — 77412 RADIATION TX DELIVERY LVL 3: CPT | Performed by: RADIOLOGY

## 2022-07-26 ENCOUNTER — HOSPITAL ENCOUNTER (OUTPATIENT)
Dept: RADIATION ONCOLOGY | Facility: HOSPITAL | Age: 57
Setting detail: RADIATION/ONCOLOGY SERIES
Discharge: HOME | End: 2022-07-26

## 2022-07-26 LAB
RAD ONC ARIA COURSE ID: NORMAL
RAD ONC ARIA COURSE LAST TREATMENT DATE: NORMAL
RAD ONC ARIA COURSE START DATE: NORMAL
RAD ONC ARIA COURSE TREATMENT ELAPSED DAYS: 21
RAD ONC ARIA FIRST TREATMENT DATE: NORMAL
RAD ONC ARIA PLAN FRACTIONS TREATED TO DATE: 3
RAD ONC ARIA PLAN ID: NORMAL
RAD ONC ARIA PLAN PRESCRIBED DOSE PER FRACTION: 0.31 GY
RAD ONC ARIA PLAN PRESCRIBED DOSE PER FRACTION: 1.8 GY
RAD ONC ARIA PLAN PRESCRIBED DOSE PER FRACTION: 1.8 GY
RAD ONC ARIA PLAN PRIMARY REFERENCE POINT: NORMAL
RAD ONC ARIA PLAN TOTAL FRACTIONS PRESCRIBED: 28
RAD ONC ARIA PLAN TOTAL PRESCRIBED DOSE: 5040 CGY
RAD ONC ARIA PLAN TOTAL PRESCRIBED DOSE: 5040 CGY
RAD ONC ARIA PLAN TOTAL PRESCRIBED DOSE: 866.8 CGY
RAD ONC ARIA REFERENCE POINT DOSAGE GIVEN TO DATE: 5.4 GY
RAD ONC ARIA REFERENCE POINT ID: NORMAL
RAD ONC ARIA REFERENCE POINT SESSION DOSAGE GIVEN: 1.8 GY

## 2022-07-26 PROCEDURE — 77412 RADIATION TX DELIVERY LVL 3: CPT | Performed by: RADIOLOGY

## 2022-07-27 ENCOUNTER — HOSPITAL ENCOUNTER (OUTPATIENT)
Dept: RADIATION ONCOLOGY | Facility: HOSPITAL | Age: 57
Setting detail: RADIATION/ONCOLOGY SERIES
Discharge: HOME | End: 2022-07-27

## 2022-07-27 LAB
RAD ONC ARIA COURSE ID: NORMAL
RAD ONC ARIA COURSE LAST TREATMENT DATE: NORMAL
RAD ONC ARIA COURSE START DATE: NORMAL
RAD ONC ARIA COURSE TREATMENT ELAPSED DAYS: 22
RAD ONC ARIA FIRST TREATMENT DATE: NORMAL
RAD ONC ARIA PLAN FRACTIONS TREATED TO DATE: 4
RAD ONC ARIA PLAN ID: NORMAL
RAD ONC ARIA PLAN PRESCRIBED DOSE PER FRACTION: 0.31 GY
RAD ONC ARIA PLAN PRESCRIBED DOSE PER FRACTION: 1.8 GY
RAD ONC ARIA PLAN PRESCRIBED DOSE PER FRACTION: 1.8 GY
RAD ONC ARIA PLAN PRIMARY REFERENCE POINT: NORMAL
RAD ONC ARIA PLAN TOTAL FRACTIONS PRESCRIBED: 28
RAD ONC ARIA PLAN TOTAL PRESCRIBED DOSE: 5040 CGY
RAD ONC ARIA PLAN TOTAL PRESCRIBED DOSE: 5040 CGY
RAD ONC ARIA PLAN TOTAL PRESCRIBED DOSE: 866.8 CGY
RAD ONC ARIA REFERENCE POINT DOSAGE GIVEN TO DATE: 7.2 GY
RAD ONC ARIA REFERENCE POINT ID: NORMAL
RAD ONC ARIA REFERENCE POINT SESSION DOSAGE GIVEN: 1.8 GY

## 2022-07-27 PROCEDURE — 77336 RADIATION PHYSICS CONSULT: CPT | Performed by: RADIOLOGY

## 2022-07-27 PROCEDURE — 77412 RADIATION TX DELIVERY LVL 3: CPT | Performed by: RADIOLOGY

## 2022-07-28 ENCOUNTER — HOSPITAL ENCOUNTER (OUTPATIENT)
Dept: RADIATION ONCOLOGY | Facility: HOSPITAL | Age: 57
Setting detail: RADIATION/ONCOLOGY SERIES
Discharge: HOME | End: 2022-07-28

## 2022-07-28 LAB
RAD ONC ARIA COURSE ID: NORMAL
RAD ONC ARIA COURSE LAST TREATMENT DATE: NORMAL
RAD ONC ARIA COURSE START DATE: NORMAL
RAD ONC ARIA COURSE TREATMENT ELAPSED DAYS: 23
RAD ONC ARIA FIRST TREATMENT DATE: NORMAL
RAD ONC ARIA PLAN FRACTIONS TREATED TO DATE: 5
RAD ONC ARIA PLAN ID: NORMAL
RAD ONC ARIA PLAN PRESCRIBED DOSE PER FRACTION: 0.31 GY
RAD ONC ARIA PLAN PRESCRIBED DOSE PER FRACTION: 1.8 GY
RAD ONC ARIA PLAN PRESCRIBED DOSE PER FRACTION: 1.8 GY
RAD ONC ARIA PLAN PRIMARY REFERENCE POINT: NORMAL
RAD ONC ARIA PLAN TOTAL FRACTIONS PRESCRIBED: 28
RAD ONC ARIA PLAN TOTAL PRESCRIBED DOSE: 5040 CGY
RAD ONC ARIA PLAN TOTAL PRESCRIBED DOSE: 5040 CGY
RAD ONC ARIA PLAN TOTAL PRESCRIBED DOSE: 866.8 CGY
RAD ONC ARIA REFERENCE POINT DOSAGE GIVEN TO DATE: 9 GY
RAD ONC ARIA REFERENCE POINT ID: NORMAL
RAD ONC ARIA REFERENCE POINT SESSION DOSAGE GIVEN: 1.8 GY

## 2022-07-28 PROCEDURE — 77412 RADIATION TX DELIVERY LVL 3: CPT | Performed by: RADIOLOGY

## 2022-07-29 ENCOUNTER — HOSPITAL ENCOUNTER (OUTPATIENT)
Dept: RADIATION ONCOLOGY | Facility: HOSPITAL | Age: 57
Setting detail: RADIATION/ONCOLOGY SERIES
Discharge: HOME | End: 2022-07-29

## 2022-07-29 LAB
RAD ONC ARIA COURSE ID: NORMAL
RAD ONC ARIA COURSE LAST TREATMENT DATE: NORMAL
RAD ONC ARIA COURSE START DATE: NORMAL
RAD ONC ARIA COURSE TREATMENT ELAPSED DAYS: 24
RAD ONC ARIA FIRST TREATMENT DATE: NORMAL
RAD ONC ARIA PLAN FRACTIONS TREATED TO DATE: 6
RAD ONC ARIA PLAN ID: NORMAL
RAD ONC ARIA PLAN PRESCRIBED DOSE PER FRACTION: 0.31 GY
RAD ONC ARIA PLAN PRESCRIBED DOSE PER FRACTION: 1.8 GY
RAD ONC ARIA PLAN PRESCRIBED DOSE PER FRACTION: 1.8 GY
RAD ONC ARIA PLAN PRIMARY REFERENCE POINT: NORMAL
RAD ONC ARIA PLAN TOTAL FRACTIONS PRESCRIBED: 28
RAD ONC ARIA PLAN TOTAL PRESCRIBED DOSE: 5040 CGY
RAD ONC ARIA PLAN TOTAL PRESCRIBED DOSE: 5040 CGY
RAD ONC ARIA PLAN TOTAL PRESCRIBED DOSE: 866.8 CGY
RAD ONC ARIA REFERENCE POINT DOSAGE GIVEN TO DATE: 10.8 GY
RAD ONC ARIA REFERENCE POINT ID: NORMAL
RAD ONC ARIA REFERENCE POINT SESSION DOSAGE GIVEN: 1.8 GY

## 2022-07-29 PROCEDURE — 77412 RADIATION TX DELIVERY LVL 3: CPT | Performed by: RADIOLOGY

## 2022-08-01 ENCOUNTER — HOSPITAL ENCOUNTER (OUTPATIENT)
Dept: RADIATION ONCOLOGY | Facility: HOSPITAL | Age: 57
Setting detail: RADIATION/ONCOLOGY SERIES
Discharge: HOME | End: 2022-08-01

## 2022-08-01 ENCOUNTER — HOSPITAL ENCOUNTER (OUTPATIENT)
Dept: RADIATION ONCOLOGY | Facility: HOSPITAL | Age: 57
Setting detail: RADIATION/ONCOLOGY SERIES
End: 2022-08-01

## 2022-08-01 LAB
RAD ONC ARIA COURSE ID: NORMAL
RAD ONC ARIA COURSE LAST TREATMENT DATE: NORMAL
RAD ONC ARIA COURSE START DATE: NORMAL
RAD ONC ARIA COURSE TREATMENT ELAPSED DAYS: 27
RAD ONC ARIA FIRST TREATMENT DATE: NORMAL
RAD ONC ARIA PLAN FRACTIONS TREATED TO DATE: 7
RAD ONC ARIA PLAN ID: NORMAL
RAD ONC ARIA PLAN PRESCRIBED DOSE PER FRACTION: 0.31 GY
RAD ONC ARIA PLAN PRESCRIBED DOSE PER FRACTION: 1.8 GY
RAD ONC ARIA PLAN PRESCRIBED DOSE PER FRACTION: 1.8 GY
RAD ONC ARIA PLAN PRIMARY REFERENCE POINT: NORMAL
RAD ONC ARIA PLAN TOTAL FRACTIONS PRESCRIBED: 28
RAD ONC ARIA PLAN TOTAL PRESCRIBED DOSE: 5040 CGY
RAD ONC ARIA PLAN TOTAL PRESCRIBED DOSE: 5040 CGY
RAD ONC ARIA PLAN TOTAL PRESCRIBED DOSE: 866.8 CGY
RAD ONC ARIA REFERENCE POINT DOSAGE GIVEN TO DATE: 12.6 GY
RAD ONC ARIA REFERENCE POINT ID: NORMAL
RAD ONC ARIA REFERENCE POINT SESSION DOSAGE GIVEN: 1.8 GY

## 2022-08-01 PROCEDURE — 77412 RADIATION TX DELIVERY LVL 3: CPT | Performed by: RADIOLOGY

## 2022-08-01 PROCEDURE — 77417 THER RADIOLOGY PORT IMAGE(S): CPT | Performed by: RADIOLOGY

## 2022-08-02 ENCOUNTER — TELEPHONE (OUTPATIENT)
Dept: RADIATION ONCOLOGY | Facility: HOSPITAL | Age: 57
End: 2022-08-02

## 2022-08-02 ENCOUNTER — HOSPITAL ENCOUNTER (OUTPATIENT)
Dept: RADIATION ONCOLOGY | Facility: HOSPITAL | Age: 57
Setting detail: RADIATION/ONCOLOGY SERIES
Discharge: HOME | End: 2022-08-02

## 2022-08-02 LAB
RAD ONC ARIA COURSE ID: NORMAL
RAD ONC ARIA COURSE LAST TREATMENT DATE: NORMAL
RAD ONC ARIA COURSE START DATE: NORMAL
RAD ONC ARIA COURSE TREATMENT ELAPSED DAYS: 28
RAD ONC ARIA FIRST TREATMENT DATE: NORMAL
RAD ONC ARIA PLAN FRACTIONS TREATED TO DATE: 8
RAD ONC ARIA PLAN ID: NORMAL
RAD ONC ARIA PLAN PRESCRIBED DOSE PER FRACTION: 0.31 GY
RAD ONC ARIA PLAN PRESCRIBED DOSE PER FRACTION: 1.8 GY
RAD ONC ARIA PLAN PRESCRIBED DOSE PER FRACTION: 1.8 GY
RAD ONC ARIA PLAN PRIMARY REFERENCE POINT: NORMAL
RAD ONC ARIA PLAN TOTAL FRACTIONS PRESCRIBED: 28
RAD ONC ARIA PLAN TOTAL PRESCRIBED DOSE: 5040 CGY
RAD ONC ARIA PLAN TOTAL PRESCRIBED DOSE: 5040 CGY
RAD ONC ARIA PLAN TOTAL PRESCRIBED DOSE: 866.8 CGY
RAD ONC ARIA REFERENCE POINT DOSAGE GIVEN TO DATE: 14.4 GY
RAD ONC ARIA REFERENCE POINT ID: NORMAL
RAD ONC ARIA REFERENCE POINT SESSION DOSAGE GIVEN: 1.8 GY

## 2022-08-02 PROCEDURE — 77412 RADIATION TX DELIVERY LVL 3: CPT | Performed by: RADIOLOGY

## 2022-08-02 NOTE — TELEPHONE ENCOUNTER
Mrs. Mir called our office today to let us know that she has tested COVID +. Her symptoms started 7/28/22. According to the current CDC guidelines she will need to isolate until 8/7/22. We will have her come in for her XRT in the afternoons using proper isolation precautions for COVID + patient until her isolation is over.

## 2022-08-03 ENCOUNTER — HOSPITAL ENCOUNTER (OUTPATIENT)
Dept: RADIATION ONCOLOGY | Facility: HOSPITAL | Age: 57
Setting detail: RADIATION/ONCOLOGY SERIES
Discharge: HOME | End: 2022-08-03

## 2022-08-03 LAB
RAD ONC ARIA COURSE ID: NORMAL
RAD ONC ARIA COURSE LAST TREATMENT DATE: NORMAL
RAD ONC ARIA COURSE START DATE: NORMAL
RAD ONC ARIA COURSE TREATMENT ELAPSED DAYS: 29
RAD ONC ARIA FIRST TREATMENT DATE: NORMAL
RAD ONC ARIA PLAN FRACTIONS TREATED TO DATE: 9
RAD ONC ARIA PLAN ID: NORMAL
RAD ONC ARIA PLAN PRESCRIBED DOSE PER FRACTION: 0.31 GY
RAD ONC ARIA PLAN PRESCRIBED DOSE PER FRACTION: 1.8 GY
RAD ONC ARIA PLAN PRESCRIBED DOSE PER FRACTION: 1.8 GY
RAD ONC ARIA PLAN PRIMARY REFERENCE POINT: NORMAL
RAD ONC ARIA PLAN TOTAL FRACTIONS PRESCRIBED: 28
RAD ONC ARIA PLAN TOTAL PRESCRIBED DOSE: 5040 CGY
RAD ONC ARIA PLAN TOTAL PRESCRIBED DOSE: 5040 CGY
RAD ONC ARIA PLAN TOTAL PRESCRIBED DOSE: 866.8 CGY
RAD ONC ARIA REFERENCE POINT DOSAGE GIVEN TO DATE: 16.2 GY
RAD ONC ARIA REFERENCE POINT ID: NORMAL
RAD ONC ARIA REFERENCE POINT SESSION DOSAGE GIVEN: 1.8 GY

## 2022-08-03 PROCEDURE — 77412 RADIATION TX DELIVERY LVL 3: CPT | Performed by: RADIOLOGY

## 2022-08-04 ENCOUNTER — HOSPITAL ENCOUNTER (OUTPATIENT)
Dept: RADIATION ONCOLOGY | Facility: HOSPITAL | Age: 57
Setting detail: RADIATION/ONCOLOGY SERIES
Discharge: HOME | End: 2022-08-04

## 2022-08-04 LAB
RAD ONC ARIA COURSE ID: NORMAL
RAD ONC ARIA COURSE LAST TREATMENT DATE: NORMAL
RAD ONC ARIA COURSE START DATE: NORMAL
RAD ONC ARIA COURSE TREATMENT ELAPSED DAYS: 30
RAD ONC ARIA FIRST TREATMENT DATE: NORMAL
RAD ONC ARIA PLAN FRACTIONS TREATED TO DATE: 10
RAD ONC ARIA PLAN ID: NORMAL
RAD ONC ARIA PLAN PRESCRIBED DOSE PER FRACTION: 0.31 GY
RAD ONC ARIA PLAN PRESCRIBED DOSE PER FRACTION: 1.8 GY
RAD ONC ARIA PLAN PRESCRIBED DOSE PER FRACTION: 1.8 GY
RAD ONC ARIA PLAN PRIMARY REFERENCE POINT: NORMAL
RAD ONC ARIA PLAN TOTAL FRACTIONS PRESCRIBED: 28
RAD ONC ARIA PLAN TOTAL PRESCRIBED DOSE: 5040 CGY
RAD ONC ARIA PLAN TOTAL PRESCRIBED DOSE: 5040 CGY
RAD ONC ARIA PLAN TOTAL PRESCRIBED DOSE: 866.8 CGY
RAD ONC ARIA REFERENCE POINT DOSAGE GIVEN TO DATE: 18 GY
RAD ONC ARIA REFERENCE POINT ID: NORMAL
RAD ONC ARIA REFERENCE POINT SESSION DOSAGE GIVEN: 1.8 GY

## 2022-08-04 PROCEDURE — 77336 RADIATION PHYSICS CONSULT: CPT | Performed by: RADIOLOGY

## 2022-08-04 PROCEDURE — 77412 RADIATION TX DELIVERY LVL 3: CPT | Performed by: RADIOLOGY

## 2022-08-05 ENCOUNTER — HOSPITAL ENCOUNTER (OUTPATIENT)
Dept: RADIATION ONCOLOGY | Facility: HOSPITAL | Age: 57
Setting detail: RADIATION/ONCOLOGY SERIES
Discharge: HOME | End: 2022-08-05

## 2022-08-05 LAB
RAD ONC ARIA COURSE ID: NORMAL
RAD ONC ARIA COURSE LAST TREATMENT DATE: NORMAL
RAD ONC ARIA COURSE START DATE: NORMAL
RAD ONC ARIA COURSE TREATMENT ELAPSED DAYS: 31
RAD ONC ARIA FIRST TREATMENT DATE: NORMAL
RAD ONC ARIA PLAN FRACTIONS TREATED TO DATE: 11
RAD ONC ARIA PLAN ID: NORMAL
RAD ONC ARIA PLAN PRESCRIBED DOSE PER FRACTION: 0.31 GY
RAD ONC ARIA PLAN PRESCRIBED DOSE PER FRACTION: 1.8 GY
RAD ONC ARIA PLAN PRESCRIBED DOSE PER FRACTION: 1.8 GY
RAD ONC ARIA PLAN PRIMARY REFERENCE POINT: NORMAL
RAD ONC ARIA PLAN TOTAL FRACTIONS PRESCRIBED: 28
RAD ONC ARIA PLAN TOTAL PRESCRIBED DOSE: 5040 CGY
RAD ONC ARIA PLAN TOTAL PRESCRIBED DOSE: 5040 CGY
RAD ONC ARIA PLAN TOTAL PRESCRIBED DOSE: 866.8 CGY
RAD ONC ARIA REFERENCE POINT DOSAGE GIVEN TO DATE: 19.8 GY
RAD ONC ARIA REFERENCE POINT ID: NORMAL
RAD ONC ARIA REFERENCE POINT SESSION DOSAGE GIVEN: 1.8 GY

## 2022-08-05 PROCEDURE — 77412 RADIATION TX DELIVERY LVL 3: CPT | Performed by: RADIOLOGY

## 2022-08-08 ENCOUNTER — HOSPITAL ENCOUNTER (OUTPATIENT)
Dept: RADIATION ONCOLOGY | Facility: HOSPITAL | Age: 57
Setting detail: RADIATION/ONCOLOGY SERIES
Discharge: HOME | End: 2022-08-08

## 2022-08-08 LAB
RAD ONC ARIA COURSE ID: NORMAL
RAD ONC ARIA COURSE LAST TREATMENT DATE: NORMAL
RAD ONC ARIA COURSE START DATE: NORMAL
RAD ONC ARIA COURSE TREATMENT ELAPSED DAYS: 34
RAD ONC ARIA FIRST TREATMENT DATE: NORMAL
RAD ONC ARIA PLAN FRACTIONS TREATED TO DATE: 12
RAD ONC ARIA PLAN ID: NORMAL
RAD ONC ARIA PLAN PRESCRIBED DOSE PER FRACTION: 0.31 GY
RAD ONC ARIA PLAN PRESCRIBED DOSE PER FRACTION: 1.8 GY
RAD ONC ARIA PLAN PRESCRIBED DOSE PER FRACTION: 1.8 GY
RAD ONC ARIA PLAN PRIMARY REFERENCE POINT: NORMAL
RAD ONC ARIA PLAN TOTAL FRACTIONS PRESCRIBED: 28
RAD ONC ARIA PLAN TOTAL PRESCRIBED DOSE: 5040 CGY
RAD ONC ARIA PLAN TOTAL PRESCRIBED DOSE: 5040 CGY
RAD ONC ARIA PLAN TOTAL PRESCRIBED DOSE: 866.8 CGY
RAD ONC ARIA REFERENCE POINT DOSAGE GIVEN TO DATE: 21.6 GY
RAD ONC ARIA REFERENCE POINT ID: NORMAL
RAD ONC ARIA REFERENCE POINT SESSION DOSAGE GIVEN: 1.8 GY

## 2022-08-08 PROCEDURE — 77417 THER RADIOLOGY PORT IMAGE(S): CPT | Performed by: RADIOLOGY

## 2022-08-08 PROCEDURE — 77412 RADIATION TX DELIVERY LVL 3: CPT | Performed by: RADIOLOGY

## 2022-08-09 ENCOUNTER — HOSPITAL ENCOUNTER (OUTPATIENT)
Dept: RADIATION ONCOLOGY | Facility: HOSPITAL | Age: 57
Setting detail: RADIATION/ONCOLOGY SERIES
Discharge: HOME | End: 2022-08-09

## 2022-08-09 LAB
RAD ONC ARIA COURSE ID: NORMAL
RAD ONC ARIA COURSE LAST TREATMENT DATE: NORMAL
RAD ONC ARIA COURSE START DATE: NORMAL
RAD ONC ARIA COURSE TREATMENT ELAPSED DAYS: 35
RAD ONC ARIA FIRST TREATMENT DATE: NORMAL
RAD ONC ARIA PLAN FRACTIONS TREATED TO DATE: 13
RAD ONC ARIA PLAN ID: NORMAL
RAD ONC ARIA PLAN PRESCRIBED DOSE PER FRACTION: 0.31 GY
RAD ONC ARIA PLAN PRESCRIBED DOSE PER FRACTION: 1.8 GY
RAD ONC ARIA PLAN PRESCRIBED DOSE PER FRACTION: 1.8 GY
RAD ONC ARIA PLAN PRIMARY REFERENCE POINT: NORMAL
RAD ONC ARIA PLAN TOTAL FRACTIONS PRESCRIBED: 28
RAD ONC ARIA PLAN TOTAL PRESCRIBED DOSE: 5040 CGY
RAD ONC ARIA PLAN TOTAL PRESCRIBED DOSE: 5040 CGY
RAD ONC ARIA PLAN TOTAL PRESCRIBED DOSE: 866.8 CGY
RAD ONC ARIA REFERENCE POINT DOSAGE GIVEN TO DATE: 23.4 GY
RAD ONC ARIA REFERENCE POINT ID: NORMAL
RAD ONC ARIA REFERENCE POINT SESSION DOSAGE GIVEN: 1.8 GY

## 2022-08-09 PROCEDURE — 77412 RADIATION TX DELIVERY LVL 3: CPT | Performed by: RADIOLOGY

## 2022-08-10 ENCOUNTER — HOSPITAL ENCOUNTER (OUTPATIENT)
Dept: RADIATION ONCOLOGY | Facility: HOSPITAL | Age: 57
Setting detail: RADIATION/ONCOLOGY SERIES
Discharge: HOME | End: 2022-08-10

## 2022-08-10 LAB
RAD ONC ARIA COURSE ID: NORMAL
RAD ONC ARIA COURSE LAST TREATMENT DATE: NORMAL
RAD ONC ARIA COURSE START DATE: NORMAL
RAD ONC ARIA COURSE TREATMENT ELAPSED DAYS: 36
RAD ONC ARIA FIRST TREATMENT DATE: NORMAL
RAD ONC ARIA PLAN FRACTIONS TREATED TO DATE: 14
RAD ONC ARIA PLAN ID: NORMAL
RAD ONC ARIA PLAN PRESCRIBED DOSE PER FRACTION: 0.31 GY
RAD ONC ARIA PLAN PRESCRIBED DOSE PER FRACTION: 1.8 GY
RAD ONC ARIA PLAN PRESCRIBED DOSE PER FRACTION: 1.8 GY
RAD ONC ARIA PLAN PRIMARY REFERENCE POINT: NORMAL
RAD ONC ARIA PLAN TOTAL FRACTIONS PRESCRIBED: 28
RAD ONC ARIA PLAN TOTAL PRESCRIBED DOSE: 5040 CGY
RAD ONC ARIA PLAN TOTAL PRESCRIBED DOSE: 5040 CGY
RAD ONC ARIA PLAN TOTAL PRESCRIBED DOSE: 866.8 CGY
RAD ONC ARIA REFERENCE POINT DOSAGE GIVEN TO DATE: 25.2 GY
RAD ONC ARIA REFERENCE POINT ID: NORMAL
RAD ONC ARIA REFERENCE POINT SESSION DOSAGE GIVEN: 1.8 GY

## 2022-08-10 PROCEDURE — 77412 RADIATION TX DELIVERY LVL 3: CPT | Performed by: RADIOLOGY

## 2022-08-10 PROCEDURE — 77336 RADIATION PHYSICS CONSULT: CPT | Performed by: RADIOLOGY

## 2022-08-11 ENCOUNTER — OFFICE VISIT (OUTPATIENT)
Dept: SURGERY | Age: 57
End: 2022-08-11
Payer: COMMERCIAL

## 2022-08-11 ENCOUNTER — HOSPITAL ENCOUNTER (OUTPATIENT)
Dept: RADIATION ONCOLOGY | Facility: HOSPITAL | Age: 57
Setting detail: RADIATION/ONCOLOGY SERIES
Discharge: HOME | End: 2022-08-11

## 2022-08-11 VITALS
OXYGEN SATURATION: 98 % | HEIGHT: 64 IN | BODY MASS INDEX: 39.78 KG/M2 | HEART RATE: 91 BPM | TEMPERATURE: 97.9 F | WEIGHT: 233 LBS

## 2022-08-11 DIAGNOSIS — C50.919 INFILTRATING LOBULAR CARCINOMA OF BREAST IN FEMALE (HCC): Primary | Chronic | ICD-10-CM

## 2022-08-11 LAB
RAD ONC ARIA COURSE ID: NORMAL
RAD ONC ARIA COURSE LAST TREATMENT DATE: NORMAL
RAD ONC ARIA COURSE START DATE: NORMAL
RAD ONC ARIA COURSE TREATMENT ELAPSED DAYS: 37
RAD ONC ARIA FIRST TREATMENT DATE: NORMAL
RAD ONC ARIA PLAN FRACTIONS TREATED TO DATE: 15
RAD ONC ARIA PLAN ID: NORMAL
RAD ONC ARIA PLAN PRESCRIBED DOSE PER FRACTION: 0.31 GY
RAD ONC ARIA PLAN PRESCRIBED DOSE PER FRACTION: 1.8 GY
RAD ONC ARIA PLAN PRESCRIBED DOSE PER FRACTION: 1.8 GY
RAD ONC ARIA PLAN PRIMARY REFERENCE POINT: NORMAL
RAD ONC ARIA PLAN TOTAL FRACTIONS PRESCRIBED: 28
RAD ONC ARIA PLAN TOTAL PRESCRIBED DOSE: 5040 CGY
RAD ONC ARIA PLAN TOTAL PRESCRIBED DOSE: 5040 CGY
RAD ONC ARIA PLAN TOTAL PRESCRIBED DOSE: 866.8 CGY
RAD ONC ARIA REFERENCE POINT DOSAGE GIVEN TO DATE: 27 GY
RAD ONC ARIA REFERENCE POINT ID: NORMAL
RAD ONC ARIA REFERENCE POINT SESSION DOSAGE GIVEN: 1.8 GY

## 2022-08-11 PROCEDURE — 99213 OFFICE O/P EST LOW 20 MIN: CPT | Performed by: SURGERY

## 2022-08-11 PROCEDURE — 77412 RADIATION TX DELIVERY LVL 3: CPT | Performed by: RADIOLOGY

## 2022-08-11 RX ORDER — CEFDINIR 300 MG/1
CAPSULE ORAL
COMMUNITY
Start: 2022-08-02

## 2022-08-11 RX ORDER — PREDNISONE 10 MG/1
TABLET ORAL
COMMUNITY
Start: 2022-08-02

## 2022-08-11 RX ORDER — ALBUTEROL SULFATE 90 UG/1
AEROSOL, METERED RESPIRATORY (INHALATION)
COMMUNITY
Start: 2022-08-02

## 2022-08-11 ASSESSMENT — ENCOUNTER SYMPTOMS
CHEST TIGHTNESS: 0
SHORTNESS OF BREATH: 0
COLOR CHANGE: 0
ABDOMINAL DISTENTION: 0
VOMITING: 0
EYE PAIN: 0
EYE REDNESS: 0
NAUSEA: 0
CONSTIPATION: 0
ABDOMINAL PAIN: 0
DIARRHEA: 0
COUGH: 0
SORE THROAT: 0
BACK PAIN: 0

## 2022-08-11 NOTE — PROGRESS NOTES
SUBJECTIVE:  Ms. Delia Shah is a 56 y.o. female who presents today with 4 months s/p bilateral mastectomy for right breast multifocal invasive lobular carcinoma with 2/3 lymph nodes positive for metastatic spread. She is CHEK 2 positive. She has started her XRT and is taking letrozole. She states she is doing well. No pain. No drainage. No skin changes from xrt and is half-way through her treatments. Patient's medications, allergies, past medical, surgical, social and family histories werereviewed and updated as appropriate. Review of Systems   Constitutional:  Negative for fatigue, fever and unexpected weight change. HENT:  Negative for hearing loss, nosebleeds and sore throat. Eyes:  Negative for pain, redness and visual disturbance. Respiratory:  Negative for cough, chest tightness and shortness of breath. Cardiovascular:  Negative for chest pain, palpitations and leg swelling. Gastrointestinal:  Negative for abdominal distention, abdominal pain, constipation, diarrhea, nausea and vomiting. Endocrine: Negative for cold intolerance, heat intolerance and polydipsia. Genitourinary:  Negative for difficulty urinating, frequency and urgency. Musculoskeletal:  Positive for arthralgias and myalgias. Negative for back pain, joint swelling and neck pain. Skin:  Negative for color change, rash and wound. Allergic/Immunologic: Negative for environmental allergies and food allergies. Neurological:  Negative for seizures, light-headedness and headaches. Hematological:  Negative for adenopathy. Does not bruise/bleed easily. Psychiatric/Behavioral:  Negative for confusion, sleep disturbance and suicidal ideas. OBJECTIVE:  Pulse 91   Temp 97.9 °F (36.6 °C) (Temporal)   Ht 5' 4\" (1.626 m)   Wt 233 lb (105.7 kg)   SpO2 98%   BMI 39.99 kg/m²   Physical Exam  Vitals reviewed. Exam conducted with a chaperone present. Constitutional:       Appearance: She is well-developed.    HENT: Head: Normocephalic and atraumatic. Eyes:      Pupils: Pupils are equal, round, and reactive to light. Cardiovascular:      Rate and Rhythm: Normal rate and regular rhythm. Pulmonary:      Effort: Pulmonary effort is normal.      Breath sounds: Normal breath sounds. No wheezing or rales. Chest:   Breasts:     Right: No axillary adenopathy or supraclavicular adenopathy. Left: No axillary adenopathy or supraclavicular adenopathy. Comments: No clear palpable masses. B/l mastectomy scars well healed. Abdominal:      General: Bowel sounds are normal. There is no distension. Palpations: Abdomen is soft. Musculoskeletal:         General: Normal range of motion. Cervical back: Normal range of motion and neck supple. Lymphadenopathy:      Cervical: No cervical adenopathy. Upper Body:      Right upper body: No supraclavicular or axillary adenopathy. Left upper body: No supraclavicular or axillary adenopathy. Skin:     General: Skin is warm and dry. Neurological:      Mental Status: She is alert and oriented to person, place, and time. Psychiatric:         Behavior: Behavior normal.         Thought Content: Thought content normal.         Judgment: Judgment normal.       ASSESSMENT:   Diagnosis Orders   1. Infiltrating lobular carcinoma of breast in female [facility])  [address] Lymphedema Treatment          PLAN:  Orders Placed This Encounter   Procedures    [facility] Lymphedema Treatment     Referral Priority:   Routine     Referral Type:   Eval and Treat     Referral Reason:   Specialty Services Required     Requested Specialty:   Physical Therapist     Number of Visits Requested:   1     No orders of the defined types were placed in this encounter. Continue XRT. Colonoscopy once completed. Continue letrozole. Will refer to Lymphedema PT for evaluation. Return in about 3 months (around 11/11/2022).     DO Franklin 9/15/8455 9:50 AM

## 2022-08-12 ENCOUNTER — HOSPITAL ENCOUNTER (OUTPATIENT)
Dept: RADIATION ONCOLOGY | Facility: HOSPITAL | Age: 57
Setting detail: RADIATION/ONCOLOGY SERIES
Discharge: HOME | End: 2022-08-12

## 2022-08-12 LAB
RAD ONC ARIA COURSE ID: NORMAL
RAD ONC ARIA COURSE LAST TREATMENT DATE: NORMAL
RAD ONC ARIA COURSE START DATE: NORMAL
RAD ONC ARIA COURSE TREATMENT ELAPSED DAYS: 38
RAD ONC ARIA FIRST TREATMENT DATE: NORMAL
RAD ONC ARIA PLAN FRACTIONS TREATED TO DATE: 16
RAD ONC ARIA PLAN ID: NORMAL
RAD ONC ARIA PLAN PRESCRIBED DOSE PER FRACTION: 0.31 GY
RAD ONC ARIA PLAN PRESCRIBED DOSE PER FRACTION: 1.8 GY
RAD ONC ARIA PLAN PRESCRIBED DOSE PER FRACTION: 1.8 GY
RAD ONC ARIA PLAN PRIMARY REFERENCE POINT: NORMAL
RAD ONC ARIA PLAN TOTAL FRACTIONS PRESCRIBED: 28
RAD ONC ARIA PLAN TOTAL PRESCRIBED DOSE: 5040 CGY
RAD ONC ARIA PLAN TOTAL PRESCRIBED DOSE: 5040 CGY
RAD ONC ARIA PLAN TOTAL PRESCRIBED DOSE: 866.8 CGY
RAD ONC ARIA REFERENCE POINT DOSAGE GIVEN TO DATE: 28.8 GY
RAD ONC ARIA REFERENCE POINT ID: NORMAL
RAD ONC ARIA REFERENCE POINT SESSION DOSAGE GIVEN: 1.8 GY

## 2022-08-12 PROCEDURE — 77412 RADIATION TX DELIVERY LVL 3: CPT | Performed by: RADIOLOGY

## 2022-08-15 ENCOUNTER — HOSPITAL ENCOUNTER (OUTPATIENT)
Dept: RADIATION ONCOLOGY | Facility: HOSPITAL | Age: 57
Setting detail: RADIATION/ONCOLOGY SERIES
Discharge: HOME | End: 2022-08-15

## 2022-08-15 DIAGNOSIS — N95.1 HOT FLASHES DUE TO MENOPAUSE: ICD-10-CM

## 2022-08-15 LAB
RAD ONC ARIA COURSE ID: NORMAL
RAD ONC ARIA COURSE LAST TREATMENT DATE: NORMAL
RAD ONC ARIA COURSE START DATE: NORMAL
RAD ONC ARIA COURSE TREATMENT ELAPSED DAYS: 41
RAD ONC ARIA FIRST TREATMENT DATE: NORMAL
RAD ONC ARIA PLAN FRACTIONS TREATED TO DATE: 17
RAD ONC ARIA PLAN ID: NORMAL
RAD ONC ARIA PLAN PRESCRIBED DOSE PER FRACTION: 0.31 GY
RAD ONC ARIA PLAN PRESCRIBED DOSE PER FRACTION: 1.8 GY
RAD ONC ARIA PLAN PRESCRIBED DOSE PER FRACTION: 1.8 GY
RAD ONC ARIA PLAN PRIMARY REFERENCE POINT: NORMAL
RAD ONC ARIA PLAN TOTAL FRACTIONS PRESCRIBED: 28
RAD ONC ARIA PLAN TOTAL PRESCRIBED DOSE: 5040 CGY
RAD ONC ARIA PLAN TOTAL PRESCRIBED DOSE: 5040 CGY
RAD ONC ARIA PLAN TOTAL PRESCRIBED DOSE: 866.8 CGY
RAD ONC ARIA REFERENCE POINT DOSAGE GIVEN TO DATE: 30.6 GY
RAD ONC ARIA REFERENCE POINT ID: NORMAL
RAD ONC ARIA REFERENCE POINT SESSION DOSAGE GIVEN: 1.8 GY

## 2022-08-15 PROCEDURE — 77417 THER RADIOLOGY PORT IMAGE(S): CPT | Performed by: RADIOLOGY

## 2022-08-15 PROCEDURE — 77412 RADIATION TX DELIVERY LVL 3: CPT | Performed by: RADIOLOGY

## 2022-08-15 RX ORDER — OXYBUTYNIN CHLORIDE 5 MG/1
TABLET ORAL
Qty: 60 TABLET | Refills: 0 | Status: SHIPPED | OUTPATIENT
Start: 2022-08-15 | End: 2022-09-14

## 2022-08-16 ENCOUNTER — HOSPITAL ENCOUNTER (OUTPATIENT)
Dept: RADIATION ONCOLOGY | Facility: HOSPITAL | Age: 57
Setting detail: RADIATION/ONCOLOGY SERIES
Discharge: HOME | End: 2022-08-16

## 2022-08-16 LAB
RAD ONC ARIA COURSE ID: NORMAL
RAD ONC ARIA COURSE LAST TREATMENT DATE: NORMAL
RAD ONC ARIA COURSE START DATE: NORMAL
RAD ONC ARIA COURSE TREATMENT ELAPSED DAYS: 42
RAD ONC ARIA FIRST TREATMENT DATE: NORMAL
RAD ONC ARIA PLAN FRACTIONS TREATED TO DATE: 18
RAD ONC ARIA PLAN ID: NORMAL
RAD ONC ARIA PLAN PRESCRIBED DOSE PER FRACTION: 0.31 GY
RAD ONC ARIA PLAN PRESCRIBED DOSE PER FRACTION: 1.8 GY
RAD ONC ARIA PLAN PRESCRIBED DOSE PER FRACTION: 1.8 GY
RAD ONC ARIA PLAN PRIMARY REFERENCE POINT: NORMAL
RAD ONC ARIA PLAN TOTAL FRACTIONS PRESCRIBED: 28
RAD ONC ARIA PLAN TOTAL PRESCRIBED DOSE: 5040 CGY
RAD ONC ARIA PLAN TOTAL PRESCRIBED DOSE: 5040 CGY
RAD ONC ARIA PLAN TOTAL PRESCRIBED DOSE: 866.8 CGY
RAD ONC ARIA REFERENCE POINT DOSAGE GIVEN TO DATE: 32.4 GY
RAD ONC ARIA REFERENCE POINT ID: NORMAL
RAD ONC ARIA REFERENCE POINT SESSION DOSAGE GIVEN: 1.8 GY

## 2022-08-16 PROCEDURE — 77412 RADIATION TX DELIVERY LVL 3: CPT | Performed by: RADIOLOGY

## 2022-08-17 ENCOUNTER — HOSPITAL ENCOUNTER (OUTPATIENT)
Dept: RADIATION ONCOLOGY | Facility: HOSPITAL | Age: 57
Setting detail: RADIATION/ONCOLOGY SERIES
Discharge: HOME | End: 2022-08-17

## 2022-08-17 LAB
RAD ONC ARIA COURSE ID: NORMAL
RAD ONC ARIA COURSE LAST TREATMENT DATE: NORMAL
RAD ONC ARIA COURSE START DATE: NORMAL
RAD ONC ARIA COURSE TREATMENT ELAPSED DAYS: 43
RAD ONC ARIA FIRST TREATMENT DATE: NORMAL
RAD ONC ARIA PLAN FRACTIONS TREATED TO DATE: 19
RAD ONC ARIA PLAN ID: NORMAL
RAD ONC ARIA PLAN PRESCRIBED DOSE PER FRACTION: 0.31 GY
RAD ONC ARIA PLAN PRESCRIBED DOSE PER FRACTION: 1.8 GY
RAD ONC ARIA PLAN PRESCRIBED DOSE PER FRACTION: 1.8 GY
RAD ONC ARIA PLAN PRIMARY REFERENCE POINT: NORMAL
RAD ONC ARIA PLAN TOTAL FRACTIONS PRESCRIBED: 28
RAD ONC ARIA PLAN TOTAL PRESCRIBED DOSE: 5040 CGY
RAD ONC ARIA PLAN TOTAL PRESCRIBED DOSE: 5040 CGY
RAD ONC ARIA PLAN TOTAL PRESCRIBED DOSE: 866.8 CGY
RAD ONC ARIA REFERENCE POINT DOSAGE GIVEN TO DATE: 34.2 GY
RAD ONC ARIA REFERENCE POINT ID: NORMAL
RAD ONC ARIA REFERENCE POINT SESSION DOSAGE GIVEN: 1.8 GY

## 2022-08-17 PROCEDURE — 77412 RADIATION TX DELIVERY LVL 3: CPT | Performed by: RADIOLOGY

## 2022-08-18 ENCOUNTER — HOSPITAL ENCOUNTER (OUTPATIENT)
Dept: RADIATION ONCOLOGY | Facility: HOSPITAL | Age: 57
Setting detail: RADIATION/ONCOLOGY SERIES
Discharge: HOME | End: 2022-08-18

## 2022-08-18 LAB
RAD ONC ARIA COURSE ID: NORMAL
RAD ONC ARIA COURSE LAST TREATMENT DATE: NORMAL
RAD ONC ARIA COURSE START DATE: NORMAL
RAD ONC ARIA COURSE TREATMENT ELAPSED DAYS: 44
RAD ONC ARIA FIRST TREATMENT DATE: NORMAL
RAD ONC ARIA PLAN FRACTIONS TREATED TO DATE: 20
RAD ONC ARIA PLAN ID: NORMAL
RAD ONC ARIA PLAN PRESCRIBED DOSE PER FRACTION: 0.31 GY
RAD ONC ARIA PLAN PRESCRIBED DOSE PER FRACTION: 1.8 GY
RAD ONC ARIA PLAN PRESCRIBED DOSE PER FRACTION: 1.8 GY
RAD ONC ARIA PLAN PRIMARY REFERENCE POINT: NORMAL
RAD ONC ARIA PLAN TOTAL FRACTIONS PRESCRIBED: 28
RAD ONC ARIA PLAN TOTAL PRESCRIBED DOSE: 5040 CGY
RAD ONC ARIA PLAN TOTAL PRESCRIBED DOSE: 5040 CGY
RAD ONC ARIA PLAN TOTAL PRESCRIBED DOSE: 866.8 CGY
RAD ONC ARIA REFERENCE POINT DOSAGE GIVEN TO DATE: 36 GY
RAD ONC ARIA REFERENCE POINT ID: NORMAL
RAD ONC ARIA REFERENCE POINT SESSION DOSAGE GIVEN: 1.8 GY

## 2022-08-18 PROCEDURE — 77336 RADIATION PHYSICS CONSULT: CPT | Performed by: RADIOLOGY

## 2022-08-18 PROCEDURE — 77412 RADIATION TX DELIVERY LVL 3: CPT | Performed by: RADIOLOGY

## 2022-08-19 ENCOUNTER — HOSPITAL ENCOUNTER (OUTPATIENT)
Dept: RADIATION ONCOLOGY | Facility: HOSPITAL | Age: 57
Setting detail: RADIATION/ONCOLOGY SERIES
Discharge: HOME | End: 2022-08-19

## 2022-08-19 LAB
RAD ONC ARIA COURSE ID: NORMAL
RAD ONC ARIA COURSE LAST TREATMENT DATE: NORMAL
RAD ONC ARIA COURSE START DATE: NORMAL
RAD ONC ARIA COURSE TREATMENT ELAPSED DAYS: 45
RAD ONC ARIA FIRST TREATMENT DATE: NORMAL
RAD ONC ARIA PLAN FRACTIONS TREATED TO DATE: 21
RAD ONC ARIA PLAN ID: NORMAL
RAD ONC ARIA PLAN PRESCRIBED DOSE PER FRACTION: 0.31 GY
RAD ONC ARIA PLAN PRESCRIBED DOSE PER FRACTION: 1.8 GY
RAD ONC ARIA PLAN PRESCRIBED DOSE PER FRACTION: 1.8 GY
RAD ONC ARIA PLAN PRIMARY REFERENCE POINT: NORMAL
RAD ONC ARIA PLAN TOTAL FRACTIONS PRESCRIBED: 28
RAD ONC ARIA PLAN TOTAL PRESCRIBED DOSE: 5040 CGY
RAD ONC ARIA PLAN TOTAL PRESCRIBED DOSE: 5040 CGY
RAD ONC ARIA PLAN TOTAL PRESCRIBED DOSE: 866.8 CGY
RAD ONC ARIA REFERENCE POINT DOSAGE GIVEN TO DATE: 37.8 GY
RAD ONC ARIA REFERENCE POINT ID: NORMAL
RAD ONC ARIA REFERENCE POINT SESSION DOSAGE GIVEN: 1.8 GY

## 2022-08-19 PROCEDURE — 77412 RADIATION TX DELIVERY LVL 3: CPT | Performed by: RADIOLOGY

## 2022-08-22 ENCOUNTER — HOSPITAL ENCOUNTER (OUTPATIENT)
Dept: RADIATION ONCOLOGY | Facility: HOSPITAL | Age: 57
Setting detail: RADIATION/ONCOLOGY SERIES
Discharge: HOME | End: 2022-08-22

## 2022-08-22 LAB
RAD ONC ARIA COURSE ID: NORMAL
RAD ONC ARIA COURSE LAST TREATMENT DATE: NORMAL
RAD ONC ARIA COURSE START DATE: NORMAL
RAD ONC ARIA COURSE TREATMENT ELAPSED DAYS: 48
RAD ONC ARIA FIRST TREATMENT DATE: NORMAL
RAD ONC ARIA PLAN FRACTIONS TREATED TO DATE: 22
RAD ONC ARIA PLAN ID: NORMAL
RAD ONC ARIA PLAN PRESCRIBED DOSE PER FRACTION: 0.31 GY
RAD ONC ARIA PLAN PRESCRIBED DOSE PER FRACTION: 1.8 GY
RAD ONC ARIA PLAN PRESCRIBED DOSE PER FRACTION: 1.8 GY
RAD ONC ARIA PLAN PRIMARY REFERENCE POINT: NORMAL
RAD ONC ARIA PLAN TOTAL FRACTIONS PRESCRIBED: 28
RAD ONC ARIA PLAN TOTAL PRESCRIBED DOSE: 5040 CGY
RAD ONC ARIA PLAN TOTAL PRESCRIBED DOSE: 5040 CGY
RAD ONC ARIA PLAN TOTAL PRESCRIBED DOSE: 866.8 CGY
RAD ONC ARIA REFERENCE POINT DOSAGE GIVEN TO DATE: 39.6 GY
RAD ONC ARIA REFERENCE POINT ID: NORMAL
RAD ONC ARIA REFERENCE POINT SESSION DOSAGE GIVEN: 1.8 GY

## 2022-08-22 PROCEDURE — 77412 RADIATION TX DELIVERY LVL 3: CPT | Performed by: RADIOLOGY

## 2022-08-22 PROCEDURE — 77417 THER RADIOLOGY PORT IMAGE(S): CPT | Performed by: RADIOLOGY

## 2022-08-23 ENCOUNTER — HOSPITAL ENCOUNTER (OUTPATIENT)
Dept: RADIATION ONCOLOGY | Facility: HOSPITAL | Age: 57
Setting detail: RADIATION/ONCOLOGY SERIES
Discharge: HOME | End: 2022-08-23

## 2022-08-23 LAB
RAD ONC ARIA COURSE ID: NORMAL
RAD ONC ARIA COURSE LAST TREATMENT DATE: NORMAL
RAD ONC ARIA COURSE START DATE: NORMAL
RAD ONC ARIA COURSE TREATMENT ELAPSED DAYS: 49
RAD ONC ARIA FIRST TREATMENT DATE: NORMAL
RAD ONC ARIA PLAN FRACTIONS TREATED TO DATE: 23
RAD ONC ARIA PLAN ID: NORMAL
RAD ONC ARIA PLAN PRESCRIBED DOSE PER FRACTION: 0.31 GY
RAD ONC ARIA PLAN PRESCRIBED DOSE PER FRACTION: 1.8 GY
RAD ONC ARIA PLAN PRESCRIBED DOSE PER FRACTION: 1.8 GY
RAD ONC ARIA PLAN PRIMARY REFERENCE POINT: NORMAL
RAD ONC ARIA PLAN TOTAL FRACTIONS PRESCRIBED: 28
RAD ONC ARIA PLAN TOTAL PRESCRIBED DOSE: 5040 CGY
RAD ONC ARIA PLAN TOTAL PRESCRIBED DOSE: 5040 CGY
RAD ONC ARIA PLAN TOTAL PRESCRIBED DOSE: 866.8 CGY
RAD ONC ARIA REFERENCE POINT DOSAGE GIVEN TO DATE: 41.4 GY
RAD ONC ARIA REFERENCE POINT ID: NORMAL
RAD ONC ARIA REFERENCE POINT SESSION DOSAGE GIVEN: 1.8 GY

## 2022-08-23 PROCEDURE — 77412 RADIATION TX DELIVERY LVL 3: CPT | Performed by: RADIOLOGY

## 2022-08-24 ENCOUNTER — HOSPITAL ENCOUNTER (OUTPATIENT)
Dept: RADIATION ONCOLOGY | Facility: HOSPITAL | Age: 57
Setting detail: RADIATION/ONCOLOGY SERIES
Discharge: HOME | End: 2022-08-24

## 2022-08-24 LAB
RAD ONC ARIA COURSE ID: NORMAL
RAD ONC ARIA COURSE LAST TREATMENT DATE: NORMAL
RAD ONC ARIA COURSE START DATE: NORMAL
RAD ONC ARIA COURSE TREATMENT ELAPSED DAYS: 50
RAD ONC ARIA FIRST TREATMENT DATE: NORMAL
RAD ONC ARIA PLAN FRACTIONS TREATED TO DATE: 24
RAD ONC ARIA PLAN ID: NORMAL
RAD ONC ARIA PLAN PRESCRIBED DOSE PER FRACTION: 0.31 GY
RAD ONC ARIA PLAN PRESCRIBED DOSE PER FRACTION: 1.8 GY
RAD ONC ARIA PLAN PRESCRIBED DOSE PER FRACTION: 1.8 GY
RAD ONC ARIA PLAN PRIMARY REFERENCE POINT: NORMAL
RAD ONC ARIA PLAN TOTAL FRACTIONS PRESCRIBED: 28
RAD ONC ARIA PLAN TOTAL PRESCRIBED DOSE: 5040 CGY
RAD ONC ARIA PLAN TOTAL PRESCRIBED DOSE: 5040 CGY
RAD ONC ARIA PLAN TOTAL PRESCRIBED DOSE: 866.8 CGY
RAD ONC ARIA REFERENCE POINT DOSAGE GIVEN TO DATE: 43.2 GY
RAD ONC ARIA REFERENCE POINT ID: NORMAL
RAD ONC ARIA REFERENCE POINT SESSION DOSAGE GIVEN: 1.8 GY

## 2022-08-24 PROCEDURE — 77412 RADIATION TX DELIVERY LVL 3: CPT | Performed by: RADIOLOGY

## 2022-08-24 PROCEDURE — 77336 RADIATION PHYSICS CONSULT: CPT | Performed by: RADIOLOGY

## 2022-08-25 ENCOUNTER — HOSPITAL ENCOUNTER (OUTPATIENT)
Dept: RADIATION ONCOLOGY | Facility: HOSPITAL | Age: 57
Setting detail: RADIATION/ONCOLOGY SERIES
Discharge: HOME | End: 2022-08-25

## 2022-08-25 LAB
RAD ONC ARIA COURSE ID: NORMAL
RAD ONC ARIA COURSE LAST TREATMENT DATE: NORMAL
RAD ONC ARIA COURSE START DATE: NORMAL
RAD ONC ARIA COURSE TREATMENT ELAPSED DAYS: 51
RAD ONC ARIA FIRST TREATMENT DATE: NORMAL
RAD ONC ARIA PLAN FRACTIONS TREATED TO DATE: 25
RAD ONC ARIA PLAN ID: NORMAL
RAD ONC ARIA PLAN PRESCRIBED DOSE PER FRACTION: 0.31 GY
RAD ONC ARIA PLAN PRESCRIBED DOSE PER FRACTION: 1.8 GY
RAD ONC ARIA PLAN PRESCRIBED DOSE PER FRACTION: 1.8 GY
RAD ONC ARIA PLAN PRIMARY REFERENCE POINT: NORMAL
RAD ONC ARIA PLAN TOTAL FRACTIONS PRESCRIBED: 28
RAD ONC ARIA PLAN TOTAL PRESCRIBED DOSE: 5040 CGY
RAD ONC ARIA PLAN TOTAL PRESCRIBED DOSE: 5040 CGY
RAD ONC ARIA PLAN TOTAL PRESCRIBED DOSE: 866.8 CGY
RAD ONC ARIA REFERENCE POINT DOSAGE GIVEN TO DATE: 45 GY
RAD ONC ARIA REFERENCE POINT ID: NORMAL
RAD ONC ARIA REFERENCE POINT SESSION DOSAGE GIVEN: 1.8 GY

## 2022-08-25 PROCEDURE — 77412 RADIATION TX DELIVERY LVL 3: CPT | Performed by: RADIOLOGY

## 2022-08-26 ENCOUNTER — HOSPITAL ENCOUNTER (OUTPATIENT)
Dept: RADIATION ONCOLOGY | Facility: HOSPITAL | Age: 57
Setting detail: RADIATION/ONCOLOGY SERIES
Discharge: HOME | End: 2022-08-26

## 2022-08-26 LAB
RAD ONC ARIA COURSE ID: NORMAL
RAD ONC ARIA COURSE LAST TREATMENT DATE: NORMAL
RAD ONC ARIA COURSE START DATE: NORMAL
RAD ONC ARIA COURSE TREATMENT ELAPSED DAYS: 52
RAD ONC ARIA FIRST TREATMENT DATE: NORMAL
RAD ONC ARIA PLAN FRACTIONS TREATED TO DATE: 26
RAD ONC ARIA PLAN ID: NORMAL
RAD ONC ARIA PLAN PRESCRIBED DOSE PER FRACTION: 0.31 GY
RAD ONC ARIA PLAN PRESCRIBED DOSE PER FRACTION: 1.8 GY
RAD ONC ARIA PLAN PRESCRIBED DOSE PER FRACTION: 1.8 GY
RAD ONC ARIA PLAN PRIMARY REFERENCE POINT: NORMAL
RAD ONC ARIA PLAN TOTAL FRACTIONS PRESCRIBED: 28
RAD ONC ARIA PLAN TOTAL PRESCRIBED DOSE: 5040 CGY
RAD ONC ARIA PLAN TOTAL PRESCRIBED DOSE: 5040 CGY
RAD ONC ARIA PLAN TOTAL PRESCRIBED DOSE: 866.8 CGY
RAD ONC ARIA REFERENCE POINT DOSAGE GIVEN TO DATE: 46.8 GY
RAD ONC ARIA REFERENCE POINT ID: NORMAL
RAD ONC ARIA REFERENCE POINT SESSION DOSAGE GIVEN: 1.8 GY

## 2022-08-26 PROCEDURE — 77412 RADIATION TX DELIVERY LVL 3: CPT | Performed by: RADIOLOGY

## 2022-08-29 ENCOUNTER — HOSPITAL ENCOUNTER (OUTPATIENT)
Dept: RADIATION ONCOLOGY | Facility: HOSPITAL | Age: 57
Setting detail: RADIATION/ONCOLOGY SERIES
Discharge: HOME | End: 2022-08-29

## 2022-08-29 LAB
RAD ONC ARIA COURSE ID: NORMAL
RAD ONC ARIA COURSE LAST TREATMENT DATE: NORMAL
RAD ONC ARIA COURSE START DATE: NORMAL
RAD ONC ARIA COURSE TREATMENT ELAPSED DAYS: 55
RAD ONC ARIA FIRST TREATMENT DATE: NORMAL
RAD ONC ARIA PLAN FRACTIONS TREATED TO DATE: 27
RAD ONC ARIA PLAN ID: NORMAL
RAD ONC ARIA PLAN PRESCRIBED DOSE PER FRACTION: 0.31 GY
RAD ONC ARIA PLAN PRESCRIBED DOSE PER FRACTION: 1.8 GY
RAD ONC ARIA PLAN PRESCRIBED DOSE PER FRACTION: 1.8 GY
RAD ONC ARIA PLAN PRIMARY REFERENCE POINT: NORMAL
RAD ONC ARIA PLAN TOTAL FRACTIONS PRESCRIBED: 28
RAD ONC ARIA PLAN TOTAL PRESCRIBED DOSE: 5040 CGY
RAD ONC ARIA PLAN TOTAL PRESCRIBED DOSE: 5040 CGY
RAD ONC ARIA PLAN TOTAL PRESCRIBED DOSE: 866.8 CGY
RAD ONC ARIA REFERENCE POINT DOSAGE GIVEN TO DATE: 48.6 GY
RAD ONC ARIA REFERENCE POINT ID: NORMAL
RAD ONC ARIA REFERENCE POINT SESSION DOSAGE GIVEN: 1.8 GY

## 2022-08-29 PROCEDURE — 77417 THER RADIOLOGY PORT IMAGE(S): CPT | Performed by: RADIOLOGY

## 2022-08-29 PROCEDURE — 77412 RADIATION TX DELIVERY LVL 3: CPT | Performed by: RADIOLOGY

## 2022-08-30 ENCOUNTER — HOSPITAL ENCOUNTER (OUTPATIENT)
Dept: RADIATION ONCOLOGY | Facility: HOSPITAL | Age: 57
Setting detail: RADIATION/ONCOLOGY SERIES
Discharge: HOME | End: 2022-08-30

## 2022-08-30 LAB
RAD ONC ARIA COURSE ID: NORMAL
RAD ONC ARIA COURSE LAST TREATMENT DATE: NORMAL
RAD ONC ARIA COURSE START DATE: NORMAL
RAD ONC ARIA COURSE TREATMENT ELAPSED DAYS: 56
RAD ONC ARIA FIRST TREATMENT DATE: NORMAL
RAD ONC ARIA PLAN FRACTIONS TREATED TO DATE: 28
RAD ONC ARIA PLAN ID: NORMAL
RAD ONC ARIA PLAN PRESCRIBED DOSE PER FRACTION: 0.31 GY
RAD ONC ARIA PLAN PRESCRIBED DOSE PER FRACTION: 1.8 GY
RAD ONC ARIA PLAN PRESCRIBED DOSE PER FRACTION: 1.8 GY
RAD ONC ARIA PLAN PRIMARY REFERENCE POINT: NORMAL
RAD ONC ARIA PLAN TOTAL FRACTIONS PRESCRIBED: 28
RAD ONC ARIA PLAN TOTAL PRESCRIBED DOSE: 5040 CGY
RAD ONC ARIA PLAN TOTAL PRESCRIBED DOSE: 5040 CGY
RAD ONC ARIA PLAN TOTAL PRESCRIBED DOSE: 866.8 CGY
RAD ONC ARIA REFERENCE POINT DOSAGE GIVEN TO DATE: 50.4 GY
RAD ONC ARIA REFERENCE POINT ID: NORMAL
RAD ONC ARIA REFERENCE POINT SESSION DOSAGE GIVEN: 1.8 GY

## 2022-08-30 PROCEDURE — 77412 RADIATION TX DELIVERY LVL 3: CPT | Performed by: RADIOLOGY

## 2022-08-30 PROCEDURE — 77336 RADIATION PHYSICS CONSULT: CPT | Performed by: RADIOLOGY

## 2022-09-01 ENCOUNTER — HOSPITAL ENCOUNTER (OUTPATIENT)
Dept: OCCUPATIONAL THERAPY | Age: 57
Setting detail: THERAPIES SERIES
Discharge: HOME | End: 2022-09-01
Payer: COMMERCIAL

## 2022-09-01 VITALS — WEIGHT: 233 LBS | BODY MASS INDEX: 39.78 KG/M2 | HEIGHT: 64 IN

## 2022-09-01 PROCEDURE — 93702 BIS XTRACELL FLUID ANALYSIS: CPT

## 2022-09-01 PROCEDURE — 97165 OT EVAL LOW COMPLEX 30 MIN: CPT

## 2022-09-01 NOTE — PROGRESS NOTES
Occupational Therapy: Initial Evaluation   Patient: Eliseo Antonio (82 y.o. female)   Examination Date:   Plan of Care Certification Period: 2022 to  2022      :  1965  MRN: [id number]  CSN: [id number]   Insurance: Payor: [employer]oltanZoie Mir[employer] / Plan: Chanda Pour / Product Type: *No Product type* /   Insurance ID: [id number] - ([facility]) Secondary Insurance (if applicable): Insurance Information: [facility]   Referring Physician: DO Stephen Montoya DO   PCP: Parish Oakes DO Visits to Date/Visits Approved:   /      No Show/Cancelled Appts:    /       Medical Diagnosis: Malignant neoplasm of unspecified site of unspecified female breast [C50.919] C50.919 Infiltrating lobular carcinoma of breast in female  No data recorded     [facility]   Patient assessed for rehabilitation services?: Yes  Self reported health status[de-identified] Excellent    Medical History: Chart Reviewed: Yes   Past Medical History:   Diagnosis Date    Cancer ([facility] [address]     breast    COVID 2022    COVID-19 2022    cold symptoms with diarrhea    Diabetes mellitus ([facility] [address]     GERD (gastroesophageal reflux disease)     Hypertension     mild; no antihypertensives; just aspirin    Hypothyroidism      Surgical History:   Past Surgical History:   Procedure Laterality Date    [address] OF UTERUS  1991    HYSTERECTOMY, TOTAL ABDOMINAL (CERVIX REMOVED)      MASTECTOMY Bilateral 2022    Bilateral SIMPLE MASTECTOMY, Right SENTINEL LYMPH NODY BIOPSY, Bilateral PEC BLOCK performed by Stephen Parikh DO at [facility] [address] Right 2022     BREAST NEEDLE BIOPSY RIGHT 2022 [facility] [address]     History obtained from[de-identified] Patient, Chart Review,      Family/Caregiver Present: Yes    Subjective History:  Onset Date: 22 Lymphedema History (if Applicable):  Oncology History (if Applicable):   History of cellulitis: No  Family history of lymphedema: No  Previous lymphedema treament: No History of Cancer and/or Treatment: Yes  Medical Oncologist: Maynor Pedraza DO  Cancer Type/Stage: Infiltrating lobular carcinoma of breast in female  History of radiation : Yes  Side effects encountered: patient has radiation burn on R clavicular region from last radiation tx this week  History of chemotherapy: No  History of surgery: Yes  Procedure/Results: bilateral mastectomy  Location: both breasts  History of Lymph Node Removal: Yes  Type/Location of Lymph Nodes: right axillary                   Pain Screening    Pain Screening  Patient Currently in Pain: No    Functional Status    Dominant Hand: : Right      OBJECTIVE EXAMINATION       Review of Systems / Circulatory Assessment:  Follows Commands: Within Functional Limits  Height and Weight  Height: 5' 4\" (162.6 cm)  Weight: 233 lb (105.7 kg)  BSA (Calculated - sq m): 2.18 sq meters  BMI (Calculated): 40.1      Measurements: Circumferential Limb Measurements   Limb Measurements Assessed: UE  Left Measurements Right Measurements   Left UE Circumferential Measurements (cm)   Patient Position: Seated  Height: 5' 4\" (162.6 cm)  Weight: 233 lb (105.7 kg)  BMI (Calculated): 39.97  Reference for Starting Point: 11 cm measuring proximally from most distal point on 3rd digit; first measurement begins near MCPs, then every 5 cm after  0 (Starting Measurement): 18.9  (+ 4 cm): 19  (+8 cm): 17.5  (+12 cm): 20  (+16 cm): 24.5  (+20 cm): 27  (+24 cm): 29  (+28 cm): 33  (+32 cm): 38  (+36 cm): 40.1  (+40 cm): 39  Total Girth - Left UE: 306  Left LE Circumferential Measurements (cm)   Height: 5' 4\" (162.6 cm)  Weight: 233 lb (105.7 kg)  BMI (Calculated): 39.97 Right UE Circumferential Measurements (cm)   Height: 5' 4\" (162.6 cm)  Weight: 233 lb (105.7 kg)  BMI (Calculated): 39.97  Reference for Starting Point: 11 cm measuring proximally from most distal point on 3rd digit; first measurement begins near MCPs, then every 5 cm after  0 (Starting Measurement): 18.4  (+ 4 cm): 18.5  (+8 cm): 17.6  (+12 cm): 19  (+16 cm): 23.7  (+20 cm): 26.9  (+24 cm): 27.7  (+28 cm): 32.1  (+32 cm): 34.7  (+36 cm): 36.8  (+40 cm): 38  Total Girth - Right UE: 293.4  Right LE Circumferential Measurements (cm)   Height: 5' 4\" (162.6 cm)  Weight: 233 lb (105.7 kg)  BMI (Calculated): 39.97   Circumferential Limb Measurements Summary  Total Girth - Ininvolved Limb (cm): 293.4  Total Girth - Uninvolved Limb (cm): 306  Girth Difference (cm): -12.6  Percentage Difference (%): -4.12     ASSESSMENT     Stage of Lymphedema: Stage 0: Latency Stage/Lymphangiopathy (Pre-stage/Subclinical Stage)  Description: Stage 0 Latency: Transport capacity is \"sub-normal\" at present time, but still sufficient enough to manage the normal amount of lymphatic load. Lymphatic load could increase- placing strain on remaining, functioning lymph collectors. Over time collectors may fatigue from increased strain. This fatigue can lead to a further decline in transport capacity placing the patient at risk of developing lymphedema. Impression: Assessment: Patient participated in [facility] evaluation for lymphedema monitoring program this date s/p bilateral mastectomy on 4/8/2022. L-dex score -3.6 this date (within normal range) and BUE circumference measurement's taken for baseline. Based on measurements patient does not demonstrate lymphedema development at this time. Patient educated on programs purpose/benefits, anatomy/physiology of lymphedema, and signs/symptoms of lymphedema. Educated patient on RUE precautions of no needlesticks and BP readings being performed on effected UE. Patient verbalizes good understanding. Patient agreeable to continue monitoring program per MD request for one year post op returning every 3 months for another follow up.  Patient does not report any RUE swelling, discomfort, or tightness. Incisions intact with no patient concerns. She reports she just finished with radiation- she has palm width radiation burn on right clavicular region from last tx this week. All patient questions answered and 3 month follow up appt scheduled after evaluation. GOALS     Patient Goal(s): Patient goals : No goals identified. Evaluation only. TREATMENT PLAN       REQUIRES OT FOLLOW-UP: Yes  Plan Comment: Evaluation only. 3 month follow up scheduled. Eval Complexity:   Decision Making: Low Complexity  Exam: L-dex, BUE circumference measurements      Therapy Time  Individual Time In: 5871  Individual Time Out: 3172  Minutes: 25  Timed Code Treatment Minutes: 25 Minutes     Therapist Signature: Patt Lemus OT   Electronically signed by BEAU Garza, SHAWN/L, CLT on 9/1/2022 at 9:44 AM   Date: 8/5/8180     I certify that the above Occupational Therapy Services are being furnished while the patient is under my care. I agree with the treatment plan and certify that this therapy is necessary. [de-identified] Signature:  ___________________________   Date:_______                                                                   Khushboo Monterroso DO        Physician Comments: _______________________________________________    Please sign and return to [facility]. Please fax to the location listed below.  Nancy Haque for this referral!    [address] [facility]  [address] [address]  Dept: [address] [place] [address] [place]: [phone]       POC NOTE

## 2022-09-14 DIAGNOSIS — N95.1 HOT FLASHES DUE TO MENOPAUSE: ICD-10-CM

## 2022-09-14 DIAGNOSIS — C50.919 INFILTRATING LOBULAR CARCINOMA OF BREAST IN FEMALE (HCC): ICD-10-CM

## 2022-09-14 DIAGNOSIS — Z17.0 CARCINOMA OF RIGHT BREAST, ESTROGEN AND PROGESTERONE RECEPTOR POSITIVE (HCC): ICD-10-CM

## 2022-09-14 DIAGNOSIS — C50.911 CARCINOMA OF RIGHT BREAST, ESTROGEN AND PROGESTERONE RECEPTOR POSITIVE (HCC): ICD-10-CM

## 2022-09-14 RX ORDER — OXYBUTYNIN CHLORIDE 5 MG/1
TABLET ORAL
Qty: 180 TABLET | Refills: 0 | Status: SHIPPED | OUTPATIENT
Start: 2022-09-14

## 2022-09-14 RX ORDER — LETROZOLE 2.5 MG/1
TABLET, FILM COATED ORAL
Qty: 90 TABLET | Refills: 0 | Status: SHIPPED | OUTPATIENT
Start: 2022-09-14

## 2022-10-07 NOTE — PROGRESS NOTES
MEDICAL ONCOLOGY PROGRESS NOTE    Pt Name: Ned Ortega  MRN: [id number]  YOB: 1965  Date of evaluation: 10/10/2022  History Obtained From:  patient and old medical records    HISTORY OF PRESENT ILLNESS:    The patient is a 56years old female who has been diagnosed with infiltrative lobular carcinoma of the right breast in February 2022. She was started on letrozole. She underwent a bilateral mastectomy on 4/8/2022. She is tolerated with complaints of mild joint pains and hot flashes. She is on oxybutynin. Hot flashes are tolerable. She has a Chek 2 mutation. Last colonoscopy April 2018 showed hyperplastic polyps only. She established with [facility]. She has complaints of back pain over the last 2 days. Diagnosis  Infiltrating lobular carcinoma, right breast, Feb 2022  Favor grade 2  ER 65%, MD 65%, HER-2 negative, Ki67 8%  [id number]  CHEK-2 mutation, April 2022  Oncotype DX Recurrence Score 19    Treatment Summary  3/3/22 Initiate endocrine hormone therapy with Letrozole 2.5mg daily   4/8/2022 Bilateral Mastectomy  Anticipate chemotherapy  7/5/22-8/30/22 Completed radiation therapy 5040 cGy over 28 treatments to Rt. Axilla    Cancer History  Quang Agarwal was first seen by me on 3/3/2022. The patient was referred by Dr. Asif Yi for diagnosis of breast cancer. The patient noticed an abnormality in the skin of the right breast.  She missed mammogram in 2021 due to COVID-19. She had mammogram in 2020.  12/9/21 Bilateral mammogram [facility]): The breast tissue is heterogeneously dense. This may lower the sensitivity of mammography. There is heterogeneous increased density with distortion suggested in the upper outer right breast 9-10 o'clock. On the right MLO slab 8/18 there is a possible 7mm nodule toward 9-10 o'clock deep in the breast. No new abnormal density is noted in the left breast. There are no suspicious appearing clustered microcalcifications or skin thickening.   12/9/21 US breast [facility]): No lesion noted in the left breast. In the right breast there is a heterogeneous process suggesting a mass at 10 o'clock. This measures 2.5 x 2.2 x 3.3cm. There is shadowing from this process. There is blood flow suggested in the process. The possible small nodule noted in the right breast on the MLO projection, on the mammogram is not identified on ultrasound. 2/17/22 Breast, right breast needle core biopsies at 10 o'clock position:  Infiltrating lobular carcinoma, favor grade 2. Invasive carcinoma measures 0.9 cm in greatest linear dimension and is present in multiple cores. ER 65%, GA 65%, HER-2 negative, Ki67 8%. 3/3/22 MRI BREAST BILATERAL W WO CONTRAST: In the right breast around 10:00 in the mid depth region, there is residual enhancing mass. There is a marker clip in this area related to prior biopsy. The area of enhancement measures 4.3 x 1.7 x 2.8 cm. There is medium to rapid initial enhancement. The delayed enhancement demonstrates a mixture of type I and type II kinetics. There is a separate nodule in the right breast located close to the biopsied lesion. This measures about 1 x 0.7 x 1.2 cm and is located at 12-1:00 in the mid depth region. There is similar enhancement of this nodule with type I--type 2 kinetics. LEFT BREAST: There is a small nodule measuring 6 mm located anteriorly and fairly close to the nipple at the 9:00 location demonstrating persistent type I kinetics. The nodule is smoothly marginated. LYMPH NODES: There are no suspicious appearing lymph nodes in either axilla. .  No suspicious-appearing lymph nodes. ADDENDUM: The patient has an additional skin lesion on the medial side of the left breast around the 10:00 o'clock location. This measures about 1.3 x 1.3 cm and enhances. Direct examination of the skin lesion is recommended. The margins of the enhancement are somewhat ill-defined. 3/3/22 Initiate endocrine hormone therapy with Letrozole 2.5mg daily. Recommended to proceed with surgery. 4/8/2022 Bilateral Mastectomy:  Infiltrating lobular carcinoma, grade 1. Infiltrating carcinoma measures 3.2 cm in greatest dimension grossly. Infiltrating carcinoma is 2.0 cm from the nearest deep surgical excision margin. Changes consistent with fibrocystic mastopathy involving nonneoplastic breast parenchyma. Sections of nipple, negative for evidence of malignancy. Skin, excision of right breast inferolateral skin: Benign skin. Breast, left mastectomy: Benign breast parenchyma with changes consistent with fibrocystic mastopathy. Sections of nipple and skin, negative for evidence of malignancy. Lymph node, right sentinel lymph node biopsy:  2 out of 3 lymph nodes, positive for metastatic lobular carcinoma. Largest metastatic focus measures 0.7 cm in greatest dimension. Indeterminate for extracapsular spread  04/21/22-essentially, node positive invasive lobular carcinoma. Recommended Oncotype DX  5/12/2022 US Abdomen Limited [facility]) The gallbladder is absent. No free fluid at the right upper quadrant. Normal right kidney measuring 118 x 48 x 50 mm. The visualized portion of the pancreas head and body is normal. Most of the pancreas is obscured by bowel gas. The IVC and upper abdominal aorta are partially obscured by bowel gas. The visualized portion of the IVC and abdominal aorta are of normal size. Summary: Fatty liver. No acute or focal abnormality. 5/27/2022 Oncotype DX Recurrence Score 19  6/16/2022 Dexa Bone Density Normal  bone density. Lumbar Spine T-Score -0.2, Femoral Neck T-Score -0.1  7/5/22-8/30/22 Completed radiation therapy 5040 cGy over 28 treatments to Rt.  Axilla      Past Medical History:    Past Medical History:   Diagnosis Date    Cancer ([address]     breast    COVID 08/02/2022    COVID-19 01/25/2022    cold symptoms with diarrhea    Diabetes mellitus ([address]     GERD (gastroesophageal reflux disease)     Hypertension     mild; no antihypertensives; 15 mg by mouth daily      methotrexate (RHEUMATREX) 2.5 MG chemo tablet Take 5 mg by mouth once a week Was 20mg but pt taking zero for two weeks and then decreasing to 7.5mg      levothyroxine (SYNTHROID) 25 MCG tablet Take 25 mcg by mouth Daily       EUTHYROX 200 MCG tablet Take 200 mcg by mouth Daily       Fiber POWD Take 1 Dose by mouth every other day       omeprazole (PRILOSEC) 20 MG delayed release capsule Take 20 mg by mouth Daily       folic acid (FOLVITE) 1 MG tablet Take 1 mg by mouth daily       Multiple Vitamins-Minerals (THERA M PLUS) TABS Take 1 tablet by mouth daily      zinc gluconate 50 MG tablet Take 50 mg by mouth daily      cefdinir (OMNICEF) 300 MG capsule TAKE 1 CAPSULE BY MOUTH TWICE DAILY      predniSONE (DELTASONE) 10 MG tablet TAKE 4 TABLETS BY MOUTH ONCE DAILY FOR 2 DAYS AND 3 ONCE DAILY FOR 3 DAYS AND 2 ONCE DAILY FOR 3 DAYS AND 1 ONCE DAILY FOR 3 DAYS AND 1/2 (ONE-HALF) ONCE DAILY FOR 2 DAYS       No current facility-administered medications for this visit. Allergies:    Allergies   Allergen Reactions    Barium-Containing Compounds Shortness Of Breath and Anaphylaxis    Sulfa Antibiotics Itching and Rash         Subjective   REVIEW OF SYSTEMS:   CONSTITUTIONAL: no fever, no night sweats,weight gain, no fatigue;  HEENT: no blurring of vision, no double vision, no hearing difficulty, no tinnitus, no ulceration, no dysplasia, no epistaxis;   LUNGS: no cough, no hemoptysis, no wheeze,  no shortness of breath;  CARDIOVASCULAR: no palpitation, no chest pain, no shortness of breath;  GI: no abdominal pain, no nausea, no vomiting, no diarrhea, no constipation;  MARINA: no dysuria, no hematuria, no frequency or urgency, no nephrolithiasis;  MUSCULOSKELETAL: low back pain, no joint pain, no swelling, no stiffness;  ENDOCRINE: no polyuria, no polydipsia, hot flashes;  HEMATOLOGY: no easy bruising or bleeding, no history of clotting disorder;  DERMATOLOGY: no skin rash, no eczema, no pruritus;  PSYCHIATRY: no depression, no anxiety, no panic attacks, no suicidal ideation, no homicidal ideation;  NEUROLOGY: no syncope, no seizures, no numbness or tingling of hands, no numbness or tingling of feet, no paresis;     Objective   /62   Pulse 74   Ht 5' 4\" (1.626 m)   Wt 222 lb (100.7 kg)   SpO2 99%   BMI 38.11 kg/m²   Wt Readings from Last 3 Encounters:   10/10/22 222 lb (100.7 kg)   09/01/22 233 lb (105.7 kg)   08/11/22 233 lb (105.7 kg)       PHYSICAL EXAM:  CONSTITUTIONAL: Alert, appropriate, no acute distress  EYES: Non icteric, EOM intact, pupils equal round   ENT: Mucus membranes moist, no oral pharyngeal lesions, external inspection of ears and nose are normal  NECK: Supple, no masses. No palpable thyroid mass  CHEST/LUNGS: CTA bilaterally, normal respiratory effort   BREAST: Chaperoned breast exam with Symone Amin RN  CARDIOVASCULAR: RRR, no murmurs. No lower extremity edema  ABDOMEN: soft non-tender, active bowel sounds, no HSM. No palpable masses  EXTREMITIES: warm, full ROM in all 4 extremities, no focal weakness. SKIN: warm, dry with no rashes or lesions  LYMPH: No cervical, clavicular, axillary, or inguinal lymphadenopathy  NEUROLOGIC: follows commands, non focal   PSYCH: mood and affect appropriate. Alert and oriented to time, place, person      LABORATORY RESULTS REVIEWED/ANALYZED BY ME:  Lab Results   Component Value Date    WBC 6.77 10/10/2022    HGB 11.9 10/10/2022    HCT 36.7 10/10/2022    MCV 97.1 (H) 10/10/2022     (L) 10/10/2022     Lab Results   Component Value Date    NEUTROABS 3.75 10/10/2022       RADIOLOGY STUDIES REVIEWED BY ME:  6/16/2022 Dexa Bone Density Normal  bone density.  Lumbar Spine T-Score -0.2, Femoral Neck T-Score -0.1    ASSESSMENT:    Orders Placed This Encounter   Procedures    External Referral To Gastroenterology     Referral Priority:   Routine     Referral Type:   Eval and Treat     Referral Reason:   Specialty Services Required Referred to Provider:   Rosaura Gutierrez MD     Requested Specialty:   Gastroenterology     Number of Visits Requested:   1        Tim Castillo was seen today for follow-up. Diagnoses and all orders for this visit:    Infiltrating lobular carcinoma of breast in female [facility])  -     External Referral To Gastroenterology    Care plan discussed with patient    CHEK2-related breast cancer [facility])  -     External Referral To Gastroenterology    Hot flashes related to aromatase inhibitor therapy    Special screening for malignant neoplasm of colon       ILC ER 65%, WY 65%, HER-2 negative, Ki67 8%, [id number]  -S/p B/L mastectomy/LN sampling  Pathology:ILC G1, 3.2 cm, 2/3 LN (+)  -Letrozole since 3/3/2022.   -Discussed NCCN guidelines.    -Oncotype DX-low recurrence score.      -4/21/2022-Oncotype DX- RS 19  -Patient is postmenopausal therefore recurrence score is low. I recommend no adjuvant chemotherapy. Discussed with RT, [facility], Dr. Ascencion Rodriguez to proceed with -7/5/22-8/30/22 Completed radiation therapy 5040 cGy over 28 treatments to Rt. Axilla      AI related side effects-hot flashes  -Continue oxybutynin for hot flashes     Postmenopausal status-started on letrozole  -Status post HTA/SOB    CHEK_2 mutation. Strong family history of breast cancer-referred for genetic test  Mary comprehensive genetic test        Hypertension-follow-up with primary care physician. Bone health-  -6/16/22 bone density: normal  6/16/2022 Dexa Bone Density Normal  bone density. Lumbar Spine T-Score -0.2, Femoral Neck T-Score -0.1  -Repeat BMD June 2025    Colon cancer screening-last colonoscopy April 2018.  -Patient has CHEK2 mutation. Recommendations to check colonoscopy every 5 years if no premalignant polyps. Last colonoscopy/pathology showed hyperplastic polyps only.   -Proceed with repeat colonoscopy if 2023      PLAN:  RTC with PAOLA Loredo, 6 months  Continue Letrozole 2.5mg daily  Continue Oxybutynin 5mg twice a day   Repeat BMD June 2025  Continue follow up with Dr. Georgette Almaguer  Refer to Dr Anjana Bond/[facility] for repeat colonoscopy April 2023  Continue follow-up with Dr Julianne Snell, Carol Ann Aponte am pre charting  as Medical Assistant for Romy Anand MD. Electronically signed by Carol Ann Aponte MA on 10/10/2022 at 2:24 PM CDT. Gayle Fox am scribing for Romy Anand MD. Electronically signed by Tasha Thakur RN on 10/10/2022 at 11:43 AM CDT. I, Dr Gold Roman, personally performed the services described in this documentation as scribed by Tasha Thakur RN in my presence and is both accurate and complete. I have seen, examined and reviewed this patient medication list, appropriate labs and imaging studies. I reviewed relevant medical records and others physicians notes. I discussed the plans of care with the patient. I answered all the questions to the patients satisfaction. I have also reviewed the chief complaint (CC) and part of the history (History of Present Illness (HPI), Past Family Social History [facility]), or Review of Systems (ROS) and made changes when appropriated.        (Please note that portions of this note were completed with a voice recognition program. Efforts were made to edit the dictations but occasionally words are mis-transcribed.)  Electronically signed by Romy Anand MD on 10/10/2022 at 12:06 PM

## 2022-10-10 ENCOUNTER — HOSPITAL ENCOUNTER (OUTPATIENT)
Dept: INFUSION THERAPY | Age: 57
Discharge: HOME | End: 2022-10-10
Payer: COMMERCIAL

## 2022-10-10 ENCOUNTER — OFFICE VISIT (OUTPATIENT)
Dept: HEMATOLOGY | Age: 57
End: 2022-10-10
Payer: COMMERCIAL

## 2022-10-10 VITALS
SYSTOLIC BLOOD PRESSURE: 112 MMHG | HEART RATE: 74 BPM | DIASTOLIC BLOOD PRESSURE: 62 MMHG | OXYGEN SATURATION: 99 % | WEIGHT: 222 LBS | HEIGHT: 64 IN | BODY MASS INDEX: 37.9 KG/M2

## 2022-10-10 DIAGNOSIS — C50.919 INFILTRATING LOBULAR CARCINOMA OF BREAST IN FEMALE (HCC): ICD-10-CM

## 2022-10-10 DIAGNOSIS — Z15.09 CHEK2-RELATED BREAST CANCER (HCC): ICD-10-CM

## 2022-10-10 DIAGNOSIS — R23.2 HOT FLASHES RELATED TO AROMATASE INHIBITOR THERAPY: ICD-10-CM

## 2022-10-10 DIAGNOSIS — Z12.11 SPECIAL SCREENING FOR MALIGNANT NEOPLASM OF COLON: ICD-10-CM

## 2022-10-10 DIAGNOSIS — T45.1X5A HOT FLASHES RELATED TO AROMATASE INHIBITOR THERAPY: ICD-10-CM

## 2022-10-10 DIAGNOSIS — Z15.89 CHEK2-RELATED BREAST CANCER (HCC): ICD-10-CM

## 2022-10-10 DIAGNOSIS — C50.919 CHEK2-RELATED BREAST CANCER (HCC): ICD-10-CM

## 2022-10-10 DIAGNOSIS — Z15.02 CHEK2-RELATED BREAST CANCER (HCC): ICD-10-CM

## 2022-10-10 DIAGNOSIS — Z71.89 CARE PLAN DISCUSSED WITH PATIENT: ICD-10-CM

## 2022-10-10 DIAGNOSIS — C50.919 INFILTRATING LOBULAR CARCINOMA OF BREAST IN FEMALE (HCC): Primary | ICD-10-CM

## 2022-10-10 LAB
BASOPHILS ABSOLUTE: 0.02 K/UL (ref 0.01–0.08)
BASOPHILS RELATIVE PERCENT: 0.3 % (ref 0.1–1.2)
EOSINOPHILS ABSOLUTE: 0.24 K/UL (ref 0.04–0.54)
EOSINOPHILS RELATIVE PERCENT: 3.5 % (ref 0.7–7)
HCT VFR BLD CALC: 36.7 % (ref 34.1–44.9)
HEMOGLOBIN: 11.9 G/DL (ref 11.2–15.7)
LYMPHOCYTES ABSOLUTE: 2.21 K/UL (ref 1.18–3.74)
LYMPHOCYTES RELATIVE PERCENT: 32.6 % (ref 19.3–53.1)
MCH RBC QN AUTO: 31.5 PG (ref 25.6–32.2)
MCHC RBC AUTO-ENTMCNC: 32.4 G/DL (ref 32.3–35.5)
MCV RBC AUTO: 97.1 FL (ref 79.4–94.8)
MONOCYTES ABSOLUTE: 0.5 K/UL (ref 0.24–0.82)
MONOCYTES RELATIVE PERCENT: 7.4 % (ref 4.7–12.5)
NEUTROPHILS ABSOLUTE: 3.75 K/UL (ref 1.56–6.13)
NEUTROPHILS RELATIVE PERCENT: 55.5 % (ref 34–71.1)
PDW BLD-RTO: 15.9 % (ref 11.7–14.4)
PLATELET # BLD: 158 K/UL (ref 182–369)
PMV BLD AUTO: 11.6 FL (ref 7.4–10.4)
RBC # BLD: 3.78 M/UL (ref 3.93–5.22)
WBC # BLD: 6.77 K/UL (ref 3.98–10.04)

## 2022-10-10 PROCEDURE — 99212 OFFICE O/P EST SF 10 MIN: CPT

## 2022-10-10 PROCEDURE — 36415 COLL VENOUS BLD VENIPUNCTURE: CPT

## 2022-10-10 PROCEDURE — 99214 OFFICE O/P EST MOD 30 MIN: CPT | Performed by: INTERNAL MEDICINE

## 2022-10-10 PROCEDURE — 85025 COMPLETE CBC W/AUTO DIFF WBC: CPT

## 2022-10-10 RX ORDER — CARVEDILOL 25 MG/1
25 TABLET ORAL 2 TIMES DAILY
COMMUNITY
Start: 2022-09-22

## 2022-10-10 RX ORDER — SPIRONOLACTONE 25 MG/1
25 TABLET ORAL DAILY
COMMUNITY
Start: 2022-09-22

## 2022-10-10 RX ORDER — UMECLIDINIUM BROMIDE AND VILANTEROL TRIFENATATE 62.5; 25 UG/1; UG/1
POWDER RESPIRATORY (INHALATION)
COMMUNITY
Start: 2022-10-06

## 2022-10-10 RX ORDER — SACUBITRIL AND VALSARTAN 24; 26 MG/1; MG/1
24-26 TABLET, FILM COATED ORAL 2 TIMES DAILY
COMMUNITY
Start: 2022-09-22

## 2022-11-03 ENCOUNTER — HOSPITAL ENCOUNTER (OUTPATIENT)
Dept: RADIATION ONCOLOGY | Facility: HOSPITAL | Age: 57
Setting detail: RADIATION/ONCOLOGY SERIES
End: 2022-11-03

## 2022-11-03 DIAGNOSIS — C50.919 INFILTRATING LOBULAR CARCINOMA OF BREAST IN FEMALE (HCC): Primary | ICD-10-CM

## 2022-11-03 PROBLEM — Z92.3 HISTORY OF RADIATION THERAPY: Status: ACTIVE | Noted: 2022-11-03

## 2022-11-04 ENCOUNTER — OFFICE VISIT (OUTPATIENT)
Dept: RADIATION ONCOLOGY | Facility: HOSPITAL | Age: 57
End: 2022-11-04

## 2022-11-04 VITALS
HEIGHT: 64 IN | SYSTOLIC BLOOD PRESSURE: 130 MMHG | WEIGHT: 222 LBS | HEART RATE: 62 BPM | OXYGEN SATURATION: 96 % | DIASTOLIC BLOOD PRESSURE: 60 MMHG | BODY MASS INDEX: 37.9 KG/M2

## 2022-11-04 DIAGNOSIS — C50.919 LOBULAR CARCINOMA OF BREAST, UNSPECIFIED LATERALITY: Primary | ICD-10-CM

## 2022-11-04 DIAGNOSIS — Z98.890 S/P LYMPH NODE BIOPSY: ICD-10-CM

## 2022-11-04 DIAGNOSIS — Z87.891 FORMER SMOKER: ICD-10-CM

## 2022-11-04 DIAGNOSIS — Z92.3 HISTORY OF RADIATION THERAPY: ICD-10-CM

## 2022-11-04 DIAGNOSIS — Z90.13 S/P BILATERAL MASTECTOMY: ICD-10-CM

## 2022-11-04 PROCEDURE — G0463 HOSPITAL OUTPT CLINIC VISIT: HCPCS | Performed by: RADIOLOGY

## 2022-11-04 RX ORDER — CARVEDILOL 25 MG/1
25 TABLET ORAL 2 TIMES DAILY
COMMUNITY
Start: 2022-10-19

## 2022-11-04 RX ORDER — SACUBITRIL AND VALSARTAN 24; 26 MG/1; MG/1
1 TABLET, FILM COATED ORAL 2 TIMES DAILY
COMMUNITY
Start: 2022-10-19

## 2022-11-04 RX ORDER — UMECLIDINIUM BROMIDE AND VILANTEROL TRIFENATATE 62.5; 25 UG/1; UG/1
1 POWDER RESPIRATORY (INHALATION) DAILY
COMMUNITY
Start: 2022-11-03

## 2022-11-04 RX ORDER — ASPIRIN 81 MG/1
81 TABLET ORAL DAILY
COMMUNITY

## 2022-12-01 ENCOUNTER — OFFICE VISIT (OUTPATIENT)
Dept: SURGERY | Age: 57
End: 2022-12-01
Payer: COMMERCIAL

## 2022-12-01 ENCOUNTER — HOSPITAL ENCOUNTER (OUTPATIENT)
Dept: OCCUPATIONAL THERAPY | Age: 57
Setting detail: THERAPIES SERIES
Discharge: HOME | End: 2022-12-01
Payer: COMMERCIAL

## 2022-12-01 VITALS — HEIGHT: 64 IN | WEIGHT: 220 LBS | BODY MASS INDEX: 37.56 KG/M2

## 2022-12-01 VITALS — WEIGHT: 220 LBS | HEIGHT: 64 IN | BODY MASS INDEX: 37.56 KG/M2

## 2022-12-01 DIAGNOSIS — Z90.13 S/P MASTECTOMY, BILATERAL: ICD-10-CM

## 2022-12-01 DIAGNOSIS — N63.0 BREAST NODULE: Primary | ICD-10-CM

## 2022-12-01 DIAGNOSIS — C50.919 INVASIVE LOBULAR CARCINOMA OF BREAST IN FEMALE (HCC): ICD-10-CM

## 2022-12-01 PROCEDURE — 97165 OT EVAL LOW COMPLEX 30 MIN: CPT

## 2022-12-01 PROCEDURE — 99214 OFFICE O/P EST MOD 30 MIN: CPT | Performed by: SURGERY

## 2022-12-01 PROCEDURE — 93702 BIS XTRACELL FLUID ANALYSIS: CPT

## 2022-12-01 ASSESSMENT — ENCOUNTER SYMPTOMS
BACK PAIN: 0
ABDOMINAL DISTENTION: 0
CONSTIPATION: 0
VOMITING: 0
EYE PAIN: 0
COUGH: 0
SHORTNESS OF BREATH: 0
CHEST TIGHTNESS: 0
SORE THROAT: 0
NAUSEA: 0
EYE REDNESS: 0
DIARRHEA: 0
COLOR CHANGE: 0
ABDOMINAL PAIN: 0

## 2022-12-01 NOTE — PROGRESS NOTES
Occupational Therapy: Initial Evaluation   Patient: Bobbi Garcia (54 y.o. female)   Examination Date:   Plan of Care Certification Period: 2022 to  2022      :  1965  MRN: [id number]  CSN: [id number]   Insurance: Payor: [employer] / Plan: [employer] / Product Type: *No Product type* /   Insurance ID: [id number] - ([facility]) Secondary Insurance (if applicable): Insurance Information: [facility]   Referring Physician: DO Rhys Oneill DO   PCP: Hershall Bosworth, DO Visits to Date/Visits Approved:   /      No Show/Cancelled Appts:    /       Medical Diagnosis: Malignant neoplasm of unspecified site of unspecified female breast [C50.919] C50.919 Infiltrating lobular carcinoma of breast in female  No data recorded     [facility]   Patient assessed for rehabilitation services?: Yes  Self reported health status[de-identified] Excellent    Medical History: Chart Reviewed: Yes   Past Medical History:   Diagnosis Date    Cancer [address] [address]     breast    COVID 2022    COVID-19 2022    cold symptoms with diarrhea    Diabetes mellitus ([facility] [address]     GERD (gastroesophageal reflux disease)     Hypertension     mild; no antihypertensives; just aspirin    Hypothyroidism      Surgical History:   Past Surgical History:   Procedure Laterality Date     [address] OF [address]TERUS  1991    HYSTERECTOMY, TOTAL ABDOMINAL (CERVIX REMOVED)      MASTECTOMY Bilateral 2022    Bilateral SIMPLE MASTECTOMY, Right SENTINEL LYMPH NODY BIOPSY, Bilateral PEC BLOCK performed by Rhys Mckeon DO at [facility] [address] Right 2022     BREAST NEEDLE BIOPSY RIGHT 2022 [address]     History obtained from[de-identified] Patient, Chart Review,      Family/Caregiver Present: Yes    Subjective History:  Onset Date: 12/01/22      Lymphedema History (if Applicable): Oncology History (if Applicable):   History of cellulitis: No  Family history of lymphedema: No  Previous lymphedema treament: No History of Cancer and/or Treatment: Yes  Medical Oncologist: Denia Anders DO  Cancer Type/Stage: Infiltrating lobular carcinoma of breast in female  History of radiation : Yes  Side effects encountered: Patient had radiation burn on right clavicular area, but it is now healed. History of chemotherapy: No  Procedure/Results: bilateral mastectomy  Location: bilateral breasts  History of Lymph Node Removal: Yes  Type/Location of Lymph Nodes: right axillary               Pain Screening    Pain Screening  Patient Currently in Pain: No    Functional Status    Dominant Hand: : Right      OBJECTIVE EXAMINATION       Review of Systems / Circulatory Assessment:  Height and Weight  Height: 5' 4\" (162.6 cm)  Weight: 220 lb (99.8 kg)  BSA (Calculated - sq m): 2.12 sq meters  BMI (Calculated): 37.8      Measurements: Circumferential Limb Measurements   Limb Measurements Assessed: UE  Left Measurements Right Measurements   Left UE Circumferential Measurements (cm)   Patient Position: Seated  Height: 5' 4\" (162.6 cm)  Weight: 220 lb (99.8 kg)  BMI (Calculated): 37.74  Reference for Starting Point: 11 cm measuring proximally from most distal point on 3rd digit; first measurement begins near MCPs, then every 5 cm after  0 (Starting Measurement): 19  (+ 4 cm): 17.4  (+8 cm): 17  (+12 cm): 19.5  (+16 cm): 23.5  (+20 cm): 26.9  (+24 cm): 28.8  (+28 cm): 32.8  (+32 cm): 37  (+36 cm): 36.4  Total Girth - Left UE: 258. 3  Left LE Circumferential Measurements (cm)   Height: 5' 4\" (162.6 cm)  Weight: 220 lb (99.8 kg)  BMI (Calculated): 37.74 Right UE Circumferential Measurements (cm)   Patient Position: Seated  Height: 5' 4\" (162.6 cm)  Weight: 220 lb (99.8 kg)  BMI (Calculated): 37.74  Reference for Starting Point: 11 cm measuring proximally from most distal point on 3rd digit; first measurement begins near MCPs, then every 5 cm after  0 (Starting Measurement): 19  (+ 4 cm): 18.8  (+8 cm): 17  (+12 cm): 18.3  (+16 cm): 24.3  (+20 cm): 26.7  (+24 cm): 29  (+28 cm): 33.8  (+32 cm): 35.5  (+36 cm): 34.8  Total Girth - Right UE: 257.2  Right LE Circumferential Measurements (cm)   Height: 5' 4\" (162.6 cm)  Weight: 220 lb (99.8 kg)  BMI (Calculated): 37.74   Circumferential Limb Measurements Summary  Involved Limb : R UE  Uninvolved Limb : L UE  Total Girth - Involved Limb (cm): 257.2  Total Girth - Uninvolved Limb (cm): 258.3  Girth Difference (cm): -1.1  Percentage Difference (%): -0.43       ASSESSMENT     Stage of Lymphedema: Stage 0: Latency Stage/Lymphangiopathy (Pre-stage/Subclinical Stage)  Description: Stage 0 Latency: Transport capacity is \"sub-normal\" at present time, but still sufficient enough to manage the normal amount of lymphatic load. Lymphatic load could increase- placing strain on remaining, functioning lymph collectors. Over time collectors may fatigue from increased strain. This fatigue can lead to a further decline in transport capacity placing the patient at risk of developing lymphedema. Impression: Assessment: Patient participated in sozo evaluation for lymphedema monitoring program this date s/p bilateral mastectomy on 4/8/2022. L-dex score -2.2 this date (within normal range) and BUE circumference measurement's taken. Patient adament about doffing life vest to obtain sozo meaurement due to trunkal swelling. Life vest donned after assessment with no s/s of adverse reactions. She reports she takes it off when she needs to. Based on measurements patient does not demonstrate lymphedema development in RUE at this time. Patient educated on programs purpose/benefits, anatomy/physiology of lymphedema, and signs/symptoms of lymphedema. Educated patient on RUE precautions of no needlesticks and BP readings being performed on effected UE.  Patient verbalizes good understanding. Patient agreeable to continue monitoring program per MD request for one year post op returning every 3 months for another follow up. Patient reports some trunkal swelling on right side of trunk. Educated and taught patient trunk decongestion self MLD techniques this date and recommended returning back to pharmacy where she purchased her surgical bra to look at compression bra options. Patient nayla exercises well this date and reports she will inquire on compression bra after she has life vest discontinued by cardiology. All patient questions answered and 3 month follow up appt scheduled after evaluation. L-Dex® Analysis for Lymphedema    The patient had a 3 month f/u SOZO measurement which I reviewed today. The score is within normal limits at -2.2. Bioimpedance spectroscopy helps identify the onset of lymphedema in an arm or leg before patients experience noticeable swelling. Research has shown that 92% of patients with early detection of lymphedema using L-Dex combined with intervention do not progress to chronic lymphedema. Whenever possible, patients are tested for baseline L-Dex score before cancer treatment begins and then are reassessed during regular follow-up visits using the SOZO device. Otherwise, this can be started postoperatively and continued during regular follow-up visits. If the patients L-Dex score increases above normal levels, that is a sign that lymphedema is developing and a referral is made to physical therapy for further evaluation and early compression treatment. Lymphedema assessment with the SOZO L-Dex score is recommended to be done every 3 months for the first 3 years and then every 6 months for years 4 and 5 followed by annually afterwards. GOALS     Patient Goal(s): Patient goals : No goals identified. Evaluation only. TREATMENT PLAN       REQUIRES OT FOLLOW-UP: Yes  Additional Comments: Evaluation only.  3 month follow up scheduled. Eval Complexity:   Decision Making: Low Complexity  Exam: L-dex, BUE circumference measurements      Therapy Time  Individual Time In: 1301  Individual Time Out: 5101  Minutes: 44  Timed Code Treatment Minutes: 40 Minutes     Therapist Signature: Roger Palencia OT   Electronically signed by BEAU Vo OTR/L, CLT on 12/1/2022 at 1:58 PM   Date: 25/7/8491     I certify that the above Occupational Therapy Services are being furnished while the patient is under my care. I agree with the treatment plan and certify that this therapy is necessary. [de-identified] Signature:  ___________________________   Date:_______                                                                   Parviz Carreon DO        Physician Comments: _______________________________________________    Please sign and return to [facility] OCCUPATIONAL THERAPY. Please fax to the location listed below.  [facility]!    [address] [facility] OCCUPATIONAL [facility]ERAP[facility]  [address] [address]  Dept: [address] [facility] [address]: [phone]       POC NOTE

## 2022-12-01 NOTE — PROGRESS NOTES
SUBJECTIVE:  Ms. Trish Cuevas is a 56 y.o. female who presents today in follow up for her ER+, right breast multifocal invasive lobular carcinoma. She underwent bilateral mastectomy in April of 2022. She has completed her radiation therapy. She recently had COVID, and since that time has had to wear a life-vest due to a decreased cardiac ejection fraction of 20%. Patient's medications, allergies, past medical, surgical, social and family histories werereviewed and updated as appropriate. Review of Systems   Constitutional:  Negative for fatigue, fever and unexpected weight change. HENT:  Negative for hearing loss, nosebleeds and sore throat. Eyes:  Negative for pain, redness and visual disturbance. Respiratory:  Negative for cough, chest tightness and shortness of breath. Cardiovascular:  Negative for chest pain, palpitations and leg swelling. Gastrointestinal:  Negative for abdominal distention, abdominal pain, constipation, diarrhea, nausea and vomiting. Endocrine: Negative for cold intolerance, heat intolerance and polydipsia. Genitourinary:  Negative for difficulty urinating, frequency and urgency. Musculoskeletal:  Positive for arthralgias and myalgias. Negative for back pain, joint swelling and neck pain. Skin:  Negative for color change, rash and wound. Allergic/Immunologic: Negative for environmental allergies and food allergies. Neurological:  Negative for seizures, light-headedness and headaches. Hematological:  Negative for adenopathy. Does not bruise/bleed easily. Psychiatric/Behavioral:  Negative for confusion, sleep disturbance and suicidal ideas. OBJECTIVE:  Ht 5' 4\" (1.626 m)   Wt 220 lb (99.8 kg)   BMI 37.76 kg/m²   Physical Exam  Vitals reviewed. Exam conducted with a chaperone present. Constitutional:       Appearance: She is well-developed. HENT:      Head: Normocephalic and atraumatic. Eyes:      Pupils: Pupils are equal, round, and reactive to light. Cardiovascular:      Rate and Rhythm: Normal rate and regular rhythm. Pulmonary:      Effort: Pulmonary effort is normal.      Breath sounds: Normal breath sounds. No wheezing or rales. Chest:      Comments: B/l mastectomy scars well healed. Palpable nodule at the upper medial portion of the left breast scar. Slightly tender. No overlying skin changes. Right breast skin changes due to radiation therapy. No erythema. Abdominal:      General: Bowel sounds are normal. There is no distension. Palpations: Abdomen is soft. Musculoskeletal:         General: Normal range of motion. Cervical back: Normal range of motion and neck supple. Lymphadenopathy:      Cervical: No cervical adenopathy. Upper Body:      Right upper body: No supraclavicular or axillary adenopathy. Left upper body: No supraclavicular or axillary adenopathy. Skin:     General: Skin is warm and dry. Neurological:      Mental Status: She is alert and oriented to person, place, and time. Psychiatric:         Behavior: Behavior normal.         Thought Content: Thought content normal.         Judgment: Judgment normal.       ASSESSMENT:   Diagnosis Orders   1. Breast nodule  US BREAST LIMITED LEFT    Left Breast      2. Invasive lobular carcinoma of breast in female ([address]  US BREAST LIMITED LEFT      3. S/P mastectomy, bilateral  US BREAST LIMITED LEFT          PLAN:  Orders Placed This Encounter   Procedures    US BREAST LIMITED LEFT     Standing Status:   Future     Standing Expiration Date:   12/1/2023     No orders of the defined types were placed in this encounter. Will proceed with ultrasound of the left breast nodule. Suspect it will be scar tissue, but given history of lobular carcinoma, it is best to have it evaluated. She notes understanding. Continue letrozole. Continue oncology f/u. Return in about 3 months (around 3/1/2023).     DO Franklin 02/3/2283 2:35 PM

## 2022-12-02 ENCOUNTER — TELEPHONE (OUTPATIENT)
Dept: SURGERY | Age: 57
End: 2022-12-02

## 2022-12-02 NOTE — TELEPHONE ENCOUNTER
Called patient and gave her the following appt information:    Patient scheduled for left breast ultrasound on 12/8/22 at 10:30, arrive at [facility] at 10:15.

## 2022-12-08 ENCOUNTER — HOSPITAL ENCOUNTER (OUTPATIENT)
Dept: WOMENS IMAGING | Age: 57
Discharge: HOME | End: 2022-12-08
Payer: COMMERCIAL

## 2022-12-08 DIAGNOSIS — N63.0 BREAST NODULE: ICD-10-CM

## 2022-12-08 DIAGNOSIS — Z90.13 S/P MASTECTOMY, BILATERAL: ICD-10-CM

## 2022-12-08 DIAGNOSIS — C50.919 INVASIVE LOBULAR CARCINOMA OF BREAST IN FEMALE (HCC): ICD-10-CM

## 2022-12-08 PROCEDURE — 76642 ULTRASOUND BREAST LIMITED: CPT

## 2022-12-13 DIAGNOSIS — N95.1 HOT FLASHES DUE TO MENOPAUSE: ICD-10-CM

## 2022-12-13 DIAGNOSIS — C50.919 INFILTRATING LOBULAR CARCINOMA OF BREAST IN FEMALE (HCC): ICD-10-CM

## 2022-12-13 DIAGNOSIS — Z17.0 CARCINOMA OF RIGHT BREAST, ESTROGEN AND PROGESTERONE RECEPTOR POSITIVE (HCC): ICD-10-CM

## 2022-12-13 DIAGNOSIS — C50.911 CARCINOMA OF RIGHT BREAST, ESTROGEN AND PROGESTERONE RECEPTOR POSITIVE (HCC): ICD-10-CM

## 2022-12-13 RX ORDER — LETROZOLE 2.5 MG/1
TABLET, FILM COATED ORAL
Qty: 90 TABLET | Refills: 0 | Status: SHIPPED | OUTPATIENT
Start: 2022-12-13

## 2022-12-13 RX ORDER — OXYBUTYNIN CHLORIDE 5 MG/1
TABLET ORAL
Qty: 180 TABLET | Refills: 0 | Status: SHIPPED | OUTPATIENT
Start: 2022-12-13

## 2023-01-03 DIAGNOSIS — N95.1 HOT FLASHES DUE TO MENOPAUSE: ICD-10-CM

## 2023-01-03 RX ORDER — OXYBUTYNIN CHLORIDE 5 MG/1
TABLET ORAL
Qty: 180 TABLET | Refills: 0 | Status: SHIPPED | OUTPATIENT
Start: 2023-01-03

## 2023-02-14 DIAGNOSIS — C50.919 INVASIVE LOBULAR CARCINOMA OF BREAST IN FEMALE (HCC): Primary | ICD-10-CM

## 2023-03-02 ENCOUNTER — HOSPITAL ENCOUNTER (OUTPATIENT)
Dept: OCCUPATIONAL THERAPY | Age: 58
Setting detail: THERAPIES SERIES
Discharge: HOME | End: 2023-03-02
Payer: COMMERCIAL

## 2023-03-02 ENCOUNTER — OFFICE VISIT (OUTPATIENT)
Dept: SURGERY | Age: 58
End: 2023-03-02
Payer: COMMERCIAL

## 2023-03-02 VITALS — BODY MASS INDEX: 38.28 KG/M2 | HEIGHT: 64 IN | WEIGHT: 224.2 LBS

## 2023-03-02 VITALS
OXYGEN SATURATION: 96 % | HEIGHT: 64 IN | BODY MASS INDEX: 38.17 KG/M2 | HEART RATE: 74 BPM | WEIGHT: 223.6 LBS | TEMPERATURE: 98 F

## 2023-03-02 DIAGNOSIS — C50.919 INFILTRATING LOBULAR CARCINOMA OF BREAST IN FEMALE (HCC): Primary | Chronic | ICD-10-CM

## 2023-03-02 PROCEDURE — 97165 OT EVAL LOW COMPLEX 30 MIN: CPT

## 2023-03-02 PROCEDURE — 99213 OFFICE O/P EST LOW 20 MIN: CPT | Performed by: SURGERY

## 2023-03-02 PROCEDURE — 93702 BIS XTRACELL FLUID ANALYSIS: CPT

## 2023-03-02 RX ORDER — TIOTROPIUM BROMIDE AND OLODATEROL 3.124; 2.736 UG/1; UG/1
SPRAY, METERED RESPIRATORY (INHALATION)
COMMUNITY
Start: 2023-02-06

## 2023-03-02 RX ORDER — ZINC GLUCONATE 50 MG
50 TABLET ORAL DAILY
COMMUNITY

## 2023-03-02 RX ORDER — FUROSEMIDE 20 MG/1
TABLET ORAL
COMMUNITY
Start: 2022-12-29

## 2023-03-02 ASSESSMENT — ENCOUNTER SYMPTOMS
VOMITING: 0
BACK PAIN: 0
DIARRHEA: 0
COUGH: 0
CHEST TIGHTNESS: 0
ABDOMINAL PAIN: 0
NAUSEA: 0
ABDOMINAL DISTENTION: 0
SORE THROAT: 0
SHORTNESS OF BREATH: 0
EYE REDNESS: 0
COLOR CHANGE: 0
CONSTIPATION: 0
EYE PAIN: 0

## 2023-03-02 NOTE — PROGRESS NOTES
SUBJECTIVE:  Ms. Natali Corcoran is a 56 y.o. female who presents today in follow up for her ER+, right breast multifocal invasive lobular carcinoma. She underwent bilateral mastectomy in April of 2022. She has completed her radiation therapy. She notes no issues. Patient's medications, allergies, past medical, surgical, social and family histories werereviewed and updated as appropriate. Review of Systems   Constitutional:  Negative for fatigue, fever and unexpected weight change. HENT:  Negative for hearing loss, nosebleeds and sore throat. Eyes:  Negative for pain, redness and visual disturbance. Respiratory:  Negative for cough, chest tightness and shortness of breath. Cardiovascular:  Negative for chest pain, palpitations and leg swelling. Gastrointestinal:  Negative for abdominal distention, abdominal pain, constipation, diarrhea, nausea and vomiting. Endocrine: Negative for cold intolerance, heat intolerance and polydipsia. Genitourinary:  Negative for difficulty urinating, frequency and urgency. Musculoskeletal:  Positive for arthralgias and myalgias. Negative for back pain, joint swelling and neck pain. Skin:  Negative for color change, rash and wound. Allergic/Immunologic: Negative for environmental allergies and food allergies. Neurological:  Negative for seizures, light-headedness and headaches. Hematological:  Negative for adenopathy. Does not bruise/bleed easily. Psychiatric/Behavioral:  Negative for confusion, sleep disturbance and suicidal ideas. OBJECTIVE:  Pulse 74   Temp 98 °F (36.7 °C)   Ht 5' 4\" (1.626 m)   Wt 223 lb 9.6 oz (101.4 kg)   SpO2 96%   BMI 38.38 kg/m²   Physical Exam  Vitals reviewed. Exam conducted with a chaperone present. Constitutional:       Appearance: She is well-developed. HENT:      Head: Normocephalic and atraumatic. Eyes:      Pupils: Pupils are equal, round, and reactive to light.    Cardiovascular:      Rate and Rhythm: Normal rate and regular rhythm. Pulmonary:      Effort: Pulmonary effort is normal.      Breath sounds: Normal breath sounds. No wheezing or rales. Chest:      Comments: B/l mastectomy scars well healed. Palpable nodule at the upper medial portion of the left breast scar. Slightly tender. No overlying skin changes. Right breast skin changes due to radiation therapy. No erythema. Abdominal:      General: Bowel sounds are normal. There is no distension. Palpations: Abdomen is soft. Musculoskeletal:         General: Normal range of motion. Cervical back: Normal range of motion and neck supple. Lymphadenopathy:      Cervical: No cervical adenopathy. Upper Body:      Right upper body: No supraclavicular or axillary adenopathy. Left upper body: No supraclavicular or axillary adenopathy. Skin:     General: Skin is warm and dry. Neurological:      Mental Status: She is alert and oriented to person, place, and time. Psychiatric:         Behavior: Behavior normal.         Thought Content: Thought content normal.         Judgment: Judgment normal.       ASSESSMENT:   Diagnosis Orders   1. Infiltrating lobular carcinoma of breast in female [facility])            PLAN:  No orders of the defined types were placed in this encounter. No orders of the defined types were placed in this encounter. Will proceed with ultrasound of the left breast nodule. Suspect it will be scar tissue, but given history of lobular carcinoma, it is best to have it evaluated. She notes understanding. Continue letrozole. Continue oncology f/u. Return in about 4 months (around 7/2/2023).     DO Franklin 8/0/4834 3:35 PM

## 2023-03-02 NOTE — PROGRESS NOTES
Occupational Therapy: Initial Evaluation   Patient: Yolanda Torres (67 y.o. female)   Examination Date:   Plan of Care Certification Period: 3/2/2023 to  3/2/2023      :  1965  MRN: [id number]  CSN: [id number]   Insurance: Payor: [address] / Plan: [address] [place] [address] / Product Type: *No Product type* /   Insurance ID: [id number] - ([facility]) Secondary Insurance (if applicable):     Insurance Information: [facility]   Referring Physician: MD Jaspreet Frankel DO   PCP: Kwesi Phan DO Visits to Date/Visits Approved:   /      No Show/Cancelled Appts:    /       Medical Diagnosis: Malignant neoplasm of unspecified site of unspecified female breast [C50.919] C50.919 Infiltrating lobular carcinoma of breast in female  No data recorded     [facility]   Patient assessed for rehabilitation services?: Yes  Self reported health status[de-identified] Excellent    Medical History: Chart Reviewed: Yes   Past Medical History:   Diagnosis Date    Cancer ([facility]     breast    COVID 2022    COVID-19 2022    cold symptoms with diarrhea    Diabetes mellitus ([facility]     GERD (gastroesophageal reflux disease)     Hypertension     mild; no antihypertensives; just aspirin    Hypothyroidism      Surgical History:   Past Surgical History:   Procedure Laterality Date    BREAST SURGERY      CARDIAC CATHETERIZATION      22 [facility]     SECTION      CHOLECYSTECTOMY      DILATION AND CURETTAGE OF UTERUS      DILATION AND CURETTAGE OF UTERUS  1991    HYSTERECTOMY, TOTAL ABDOMINAL (CERVIX REMOVED)      MASTECTOMY Bilateral 2022    Bilateral SIMPLE MASTECTOMY, Right SENTINEL LYMPH NODY BIOPSY, Bilateral PEC BLOCK performed by Jaspreet Rivas DO at [address] [place] [address] RIGHT Right 2022     BREAST NEEDLE BIOPSY RIGHT 2022 [address]     History obtained from[de-identified] Patient, Chart Review,      Family/Caregiver Present: Yes    Subjective History: Onset Date: 03/02/23       Lymphedema History (if Applicable):  Oncology History (if Applicable):   History of cellulitis: No  Family history of lymphedema: No  Previous lymphedema treament: No History of Cancer and/or Treatment: Yes  Medical Oncologist: Kelechi Stephenson DO  Cancer Type/Stage: Infiltrating lobular carcinoma of breast in female  History of radiation : Yes  History of chemotherapy: No  History of surgery: Yes  Procedure/Results: bilateral mastectomy  Location: bilateral breasts  History of Lymph Node Removal: Yes  Type/Location of Lymph Nodes: right axillary               Pain Screening    Pain Screening  Patient Currently in Pain: No    Functional Status    Dominant Hand: : Right      OBJECTIVE EXAMINATION       Review of Systems / Circulatory Assessment:  Height and Weight  Height: 5' 4\" (162.6 cm)  Weight: 224 lb 3.2 oz (101.7 kg)  BSA (Calculated - sq m): 2.14 sq meters  BMI (Calculated): 38.6      Left AROM  Right AROM         AROM LUE (degrees)  LUE AROM : WNL    AROM RUE (degrees)  RUE AROM : WNL       Measurements: Circumferential Limb Measurements   Limb Measurements Assessed: UE  Left Measurements Right Measurements   Left UE Circumferential Measurements (cm)   Patient Position: Seated  Dominent Hand: Right  Height: 5' 4\" (162.6 cm)  Weight: 224 lb 3.2 oz (101.7 kg)  BMI (Calculated): 38.46  Reference for Starting Point: 11 cm measuring proximally from most distal point on 3rd digit; first measurement begins near MCPs, then every 5 cm after  0 (Starting Measurement): 18.8  (+ 4 cm): 19.3  (+8 cm): 26.9  (+12 cm): 29.5  (+16 cm): 23.5  (+20 cm): 26.3  (+24 cm): 27.8  (+28 cm): 31.7  (+32 cm): 36.5  (+36 cm): 38.3  (+40 cm): 36.6  Total Girth - Left UE: 315.2  Left LE Circumferential Measurements (cm)   Height: 5' 4\" (162.6 cm)  Weight: 224 lb 3.2 oz (101.7 kg)  BMI (Calculated): 38.46 Right UE Circumferential Measurements (cm)   Patient Position: Seated  Dominent Hand: Right  Height: 5' 4\" (162.6 cm)  Weight: 224 lb 3.2 oz (101.7 kg)  BMI (Calculated): 38.46  Reference for Starting Point: 11 cm measuring proximally from most distal point on 3rd digit; first measurement begins near MCPs, then every 5 cm after  0 (Starting Measurement): 19.3  (+ 4 cm): 17.6  (+8 cm): 17.3  (+12 cm): 19.4  (+16 cm): 24  (+20 cm): 26.4  (+24 cm): 27.5  (+28 cm): 30.8  (+32 cm): 33.8  (+36 cm): 34.9  (+40 cm): 35.6  Total Girth - Right UE: 286. 6  Right LE Circumferential Measurements (cm)   Height: 5' 4\" (162.6 cm)  Weight: 224 lb 3.2 oz (101.7 kg)  BMI (Calculated): 38.46   Circumferential Limb Measurements Summary  Involved Limb : R UE  Uninvolved Limb : L UE  Total Girth - Involved Limb (cm): 286.6  Total Girth - Uninvolved Limb (cm): 315.2  Girth Difference (cm): -28.6  Percentage Difference (%): -9.07       ASSESSMENT     Stage of Lymphedema: Stage 0: Latency Stage/Lymphangiopathy (Pre-stage/Subclinical Stage)  Description: Stage 0 Latency: Transport capacity is \"sub-normal\" at present time, but still sufficient enough to manage the normal amount of lymphatic load. Lymphatic load could increase- placing strain on remaining, functioning lymph collectors. Over time collectors may fatigue from increased strain. This fatigue can lead to a further decline in transport capacity placing the patient at risk of developing lymphedema. Impression: Assessment: Patient participated in sozo evaluation for lymphedema monitoring program this date s/p bilateral mastectomy on 4/8/2022. L-dex score -4.2 this date (within normal range) and BUE circumference measurement's taken (within normal range). Based on measurements patient does not demonstrate lymphedema development in RUE at this time. Patient educated on programs purpose/benefits, anatomy/physiology of lymphedema, and signs/symptoms of lymphedema.  Educated patient on RUE precautions of no needlesticks and BP readings being performed on effected UE. Patient verbalizes good understanding. Patient agreeable to continue monitoring program per MD request for one year post op returning every 3 months for another follow up. All patient questions answered and 3 month follow up appt scheduled after evaluation. L-Dex® Analysis for Lymphedema    The patient had a 3 month f/u SOZO measurement which I reviewed today. The score is within normal limits at -4.2. Bioimpedance spectroscopy helps identify the onset of lymphedema in an arm or leg before patients experience noticeable swelling. Research has shown that 92% of patients with early detection of lymphedema using L-Dex combined with intervention do not progress to chronic lymphedema. Whenever possible, patients are tested for baseline L-Dex score before cancer treatment begins and then are reassessed during regular follow-up visits using the SOZO device. Otherwise, this can be started postoperatively and continued during regular follow-up visits. If the patients L-Dex score increases above normal levels, that is a sign that lymphedema is developing and a referral is made to physical therapy for further evaluation and early compression treatment. Lymphedema assessment with the SOZO L-Dex score is recommended to be done every 3 months for the first 3 years and then every 6 months for years 4 and 5 followed by annually afterwards. GOALS     Patient Goal(s): Patient goals : No goals identified. Evaluation only. TREATMENT PLAN       REQUIRES OT FOLLOW-UP: Yes  Additional Comments: Evaluation only. 3 month follow up scheduled.     Eval Complexity:   Decision Making: Low Complexity  Exam: L-dex, BUE circumference measurements    Therapy Time  Individual Time In: 1304  Individual Time Out: 1333  Minutes: 29  Timed Code Treatment Minutes: 29 Minutes     Therapist Signature: Blake Juárez OT  Electronically signed by BEAU Reis, SHAWN/L, CLT on 3/2/2023 at 1:38 PM    Date: 3/2/2023 I certify that the above Occupational Therapy Services are being furnished while the patient is under my care. I agree with the treatment plan and certify that this therapy is necessary. Physician's Signature:  ___________________________   Date:_______                                                                   Raymond Barrios MD        Physician Comments: _______________________________________________    Please sign and return to [facility] OCCUPATIONAL THERAPY. Please fax to the location listed below.  Nancy Haque for this referral!    [address] [facility] OCCUPATIONAL THERAPY  [address] [address]  Dept: [address] [place] [address] [place]: [phone]       POC NOTE

## 2023-03-30 ENCOUNTER — OFFICE VISIT (OUTPATIENT)
Dept: GASTROENTEROLOGY | Facility: CLINIC | Age: 58
End: 2023-03-30
Payer: COMMERCIAL

## 2023-03-30 VITALS
TEMPERATURE: 98.2 F | SYSTOLIC BLOOD PRESSURE: 110 MMHG | WEIGHT: 228.8 LBS | BODY MASS INDEX: 39.06 KG/M2 | HEART RATE: 70 BPM | OXYGEN SATURATION: 99 % | HEIGHT: 64 IN | DIASTOLIC BLOOD PRESSURE: 70 MMHG

## 2023-03-30 DIAGNOSIS — K63.5 POLYP OF COLON, UNSPECIFIED PART OF COLON, UNSPECIFIED TYPE: Primary | ICD-10-CM

## 2023-03-30 DIAGNOSIS — R13.19 ESOPHAGEAL DYSPHAGIA: ICD-10-CM

## 2023-03-30 DIAGNOSIS — R19.7 DIARRHEA, UNSPECIFIED TYPE: ICD-10-CM

## 2023-03-30 DIAGNOSIS — R10.9 ABDOMINAL PAIN, UNSPECIFIED ABDOMINAL LOCATION: ICD-10-CM

## 2023-03-30 DIAGNOSIS — K62.5 RECTAL BLEEDING: ICD-10-CM

## 2023-03-30 DIAGNOSIS — K21.9 GASTROESOPHAGEAL REFLUX DISEASE, UNSPECIFIED WHETHER ESOPHAGITIS PRESENT: ICD-10-CM

## 2023-03-30 DIAGNOSIS — Z79.1 ENCOUNTER FOR LONG-TERM (CURRENT) USE OF NSAIDS: ICD-10-CM

## 2023-03-30 DIAGNOSIS — Z83.71 FAMILY HISTORY OF POLYPS IN THE COLON: ICD-10-CM

## 2023-03-30 PROCEDURE — 99204 OFFICE O/P NEW MOD 45 MIN: CPT | Performed by: NURSE PRACTITIONER

## 2023-03-30 RX ORDER — FUROSEMIDE 20 MG/1
20 TABLET ORAL 2 TIMES DAILY
COMMUNITY

## 2023-03-30 RX ORDER — SPIRONOLACTONE 25 MG/1
25 TABLET ORAL DAILY
COMMUNITY

## 2023-03-30 RX ORDER — SODIUM, POTASSIUM,MAG SULFATES 17.5-3.13G
1 SOLUTION, RECONSTITUTED, ORAL ORAL EVERY 12 HOURS
Qty: 354 ML | Refills: 0 | Status: SHIPPED | OUTPATIENT
Start: 2023-03-30

## 2023-03-30 RX ORDER — ASPIRIN 325 MG
325 TABLET ORAL DAILY
COMMUNITY

## 2023-03-30 RX ORDER — TIOTROPIUM BROMIDE AND OLODATEROL 3.124; 2.736 UG/1; UG/1
SPRAY, METERED RESPIRATORY (INHALATION)
COMMUNITY
Start: 2023-02-06

## 2023-03-30 NOTE — H&P (VIEW-ONLY)
Primary Physician: Lloyd Lynn DO    Chief Complaint   Patient presents with   • GI Problem     Colonoscopy/Endoscopy       Subjective     Jordy Mir is a 58 y.o. female.    HPI   Personal history of colon polyps  Pt reports a few changes with her bowel pattern.  Pt reports very vague symptoms of abdominal pain near her umbilicus. She can't tell me when she experiences it.  Does not seem to be triggered by food.  No constipation.  She does have diarrhea intermittently that is worse post prandially and associated with urgency.  Pt reports seeing bright red blood per rectum this week. It was a lot in nature.  No rectal pain.  Patient's last colonoscopy 2018 with many small mouth diverticula in the left colon, 2 polyps of the rectum found, these were hyperplastic in nature.       Family history of colon polyps  Mother has had colon polyps      GERD  Pt is having acid reflux daily and nightly.  Has noticed issues swallowing rice.  Pt denies taking her Omeprazole as directed.  Pt is taking Mobic once daily    Past Medical History:   Diagnosis Date   • Disease of thyroid gland    • Diverticulosis    • Family history of colonic polyps    • GERD (gastroesophageal reflux disease)    • History of colon polyps    • Hyperlipidemia    • Lobular carcinoma of breast (HCC) 2022   • Narcolepsy    • Rheumatoid arthritis (HCC)        Past Surgical History:   Procedure Laterality Date   •  SECTION     • CHOLECYSTECTOMY     • COLONOSCOPY N/A 2018    Diverticulosis in the left colon; Two 4-5mm hyperplastic polyps in the rectum; Repeat 5 years   • HYSTERECTOMY     • MASTECTOMY Bilateral 2022   • OOPHORECTOMY          Current Outpatient Medications:   •  albuterol (PROVENTIL HFA;VENTOLIN HFA) 108 (90 Base) MCG/ACT inhaler, Inhale 2 puffs Every 4 (Four) Hours As Needed for Wheezing., Disp: 1 inhaler, Rfl: 5  •  aspirin 325 MG tablet, Take 1 tablet by mouth Daily. As needed, Disp: , Rfl:   •   aspirin 81 MG EC tablet, Take 1 tablet by mouth Daily., Disp: , Rfl:   •  carvedilol (COREG) 25 MG tablet, Take 1 tablet by mouth 2 (Two) Times a Day., Disp: , Rfl:   •  Entresto 24-26 MG tablet, Take 1 tablet by mouth 2 (Two) Times a Day., Disp: , Rfl:   •  Fiber powder, Take  by mouth. 1 tablespoon daily, Disp: , Rfl:   •  folic acid (FOLVITE) 1 MG tablet, Take 1 tablet by mouth Daily., Disp: , Rfl:   •  furosemide (LASIX) 20 MG tablet, Take 1 tablet by mouth 2 (Two) Times a Day., Disp: , Rfl:   •  letrozole (FEMARA) 2.5 MG tablet, Take 1 tablet by mouth Daily., Disp: , Rfl:   •  levothyroxine (SYNTHROID) 200 MCG tablet, Take 1 tablet by mouth Daily., Disp: 60 tablet, Rfl: 0  •  levothyroxine (SYNTHROID, LEVOTHROID) 25 MCG tablet, Take 1 tablet by mouth Daily., Disp: , Rfl:   •  meloxicam (MOBIC) 15 MG tablet, Take 1 tablet by mouth Daily., Disp: , Rfl:   •  metFORMIN (GLUCOPHAGE) 500 MG tablet, Take 1 tablet by mouth 2 (Two) Times a Day., Disp: , Rfl:   •  methotrexate 2.5 MG tablet, Take 8 tablets by mouth 1 (One) Time Per Week., Disp: , Rfl:   •  multivitamin with minerals tablet tablet, Take 1 tablet by mouth Daily., Disp: , Rfl:   •  spironolactone (ALDACTONE) 25 MG tablet, Take 1 tablet by mouth Daily., Disp: , Rfl:   •  tiotropium bromide-olodaterol (Stiolto Respimat) 2.5-2.5 MCG/ACT aerosol solution inhaler, INHALE 2 PUFFS BY MOUTH ONCE DAILY FOR 30 DAYS, Disp: , Rfl:   •  Zinc 50 MG tablet, Take 1 tablet by mouth Daily., Disp: , Rfl:   •  Anoro Ellipta 62.5-25 MCG/ACT aerosol powder  inhaler, Inhale 1 puff Daily., Disp: , Rfl:   •  aspirin 81 MG EC tablet, Take 81 mg by mouth Daily., Disp: , Rfl:   •  DICLOFENAC PO, Take 75 mg by mouth Daily. 1-2 daily, Disp: , Rfl:   •  glucosamine-chondroitin 500-400 MG capsule capsule, Take 1 capsule by mouth 2 (Two) Times a Day With Meals., Disp: , Rfl:   •  omeprazole (priLOSEC) 20 MG capsule, Take 1 capsule by mouth Daily., Disp: 90 capsule, Rfl: 1  •  polyethyl  "glycol-propyl glycol (SYSTANE) 0.4-0.3 % solution ophthalmic solution, Every 1 (One) Hour As Needed., Disp: , Rfl:   •  simvastatin (ZOCOR) 20 MG tablet, Take 30 mg by mouth Every Night., Disp: , Rfl:   •  sodium-potassium-magnesium sulfates (Suprep Bowel Prep Kit) 17.5-3.13-1.6 GM/177ML solution oral solution, Take 1 bottle by mouth Every 12 (Twelve) Hours., Disp: 354 mL, Rfl: 0    Allergies   Allergen Reactions   • Contrast Dye (Echo Or Unknown Ct/Mr) Anaphylaxis   • Sulfa Antibiotics Rash       Social History     Socioeconomic History   • Marital status:    Tobacco Use   • Smoking status: Former   • Smokeless tobacco: Never   • Tobacco comments:     Quit in 2004   Substance and Sexual Activity   • Alcohol use: No   • Drug use: No   • Sexual activity: Defer       Family History   Problem Relation Age of Onset   • Arthritis Mother    • Colon polyps Mother    • Arthritis Father    • No Known Problems Sister    • Breast cancer Maternal Aunt    • Depression Maternal Grandmother    • Depression Paternal Grandfather    • Narcolepsy Daughter    • Breast cancer Maternal Cousin        Review of Systems   Constitutional: Negative for unexpected weight change.   Respiratory: Negative for shortness of breath.    Cardiovascular: Negative for chest pain.       Objective     /70   Pulse 70   Temp 98.2 °F (36.8 °C)   Ht 162.6 cm (64\")   Wt 104 kg (228 lb 12.8 oz)   SpO2 99%   Breastfeeding No   BMI 39.27 kg/m²     Physical Exam  Vitals reviewed.   Constitutional:       Appearance: Normal appearance.   Cardiovascular:      Rate and Rhythm: Normal rate and regular rhythm.      Heart sounds: Normal heart sounds.   Pulmonary:      Effort: Pulmonary effort is normal.      Breath sounds: Normal breath sounds.   Neurological:      Mental Status: She is alert.         Lab Results - Last 18 Months   Lab Units 04/08/22  1109 04/01/22  1320   GLUCOSE mg/dL 154* 136*   BUN mg/dL  --  13   CREATININE mg/dL  --  0.5 "   SODIUM mmol/L  --  144   POTASSIUM mmol/L  --  4.0   CHLORIDE mmol/L  --  102   TOTAL CO2 mmol/L  --  27       Lab Results - Last 18 Months   Lab Units 10/10/22  1054 04/21/22  1118 03/03/22  0910   HEMOGLOBIN g/dL 11.9 13.0 13.7   HEMATOCRIT % 36.7 40.3 41.4   MCV fL 97.1* 97.3* 101.0*   WBC K/uL 6.77 11.30* 8.60   RDW % 15.9* 13.3 13.4   MPV fL 11.6* 10.8* 11.3*   PLATELETS K/uL 158* 176* 175*           IMPRESSION/PLAN:    Assessment & Plan      Problem List Items Addressed This Visit        Family History    Family history of polyps in the colon    Overview     Mother            Gastrointestinal Abdominal     Colon polyps - Primary    Overview     last colonoscopy 4/12/2018 with many small mouth diverticula in the left colon, 2 polyps of the rectum found, these were hyperplastic in nature.         Relevant Medications    sodium-potassium-magnesium sulfates (Suprep Bowel Prep Kit) 17.5-3.13-1.6 GM/177ML solution oral solution    GERD (gastroesophageal reflux disease)    Relevant Medications    sodium-potassium-magnesium sulfates (Suprep Bowel Prep Kit) 17.5-3.13-1.6 GM/177ML solution oral solution    Esophageal dysphagia    Overview     Trouble swallowing rice         Relevant Medications    sodium-potassium-magnesium sulfates (Suprep Bowel Prep Kit) 17.5-3.13-1.6 GM/177ML solution oral solution    Rectal bleeding    Relevant Medications    sodium-potassium-magnesium sulfates (Suprep Bowel Prep Kit) 17.5-3.13-1.6 GM/177ML solution oral solution    Abdominal pain    Overview     Generalized, pt very vague in her description of symptoms         Diarrhea    Overview     Post prandially associated with urgency            Health Encounters    Encounter for long-term (current) use of NSAIDs    Overview     Takes Mobic daily since 2017          Endoscopy and Colonoscopy per Dr Colton Taylor Prep             ..The risks, benefits, and alternatives of colonoscopy were reviewed with the patient today.  Risks including  perforation of the colon possibly requiring surgery or colostomy.  Additional risks include risk of bleeding from biopsies or removal of colon tissue.  There is also the risk of a drug reaction or problems with anesthesia.  This will be discussed with the further by the anesthesia team on the day of the procedure.  Lastly there is a possibility of missing a colon polyp or cancer.  The benefits include the diagnosis and management of disease of the colon and rectum.  Alternatives to colonoscopy include barium enema, laboratory testing, radiographic evaluation, or no intervention.  The patient verbalizes understanding and agrees.    In accordance with requirements under the Affordable Care Act, [facility] has provided pricing for all hospital services and items on each of its websites. However, a patient's actual cost may differ based on the services the patient receives to meet individual healthcare needs and based on the benefits provided under the patient’s insurance coverage.        Trista Eason, APRN  03/30/23  10:08 CDT    Part of this note may be an electronic transcription/translation of spoken language to printed text.

## 2023-03-30 NOTE — PROGRESS NOTES
Primary Physician: Lloyd Lynn DO    Chief Complaint   Patient presents with   • GI Problem     Colonoscopy/Endoscopy       Subjective     Jordy Mir is a 58 y.o. female.    HPI   Personal history of colon polyps  Pt reports a few changes with her bowel pattern.  Pt reports very vague symptoms of abdominal pain near her umbilicus. She can't tell me when she experiences it.  Does not seem to be triggered by food.  No constipation.  She does have diarrhea intermittently that is worse post prandially and associated with urgency.  Pt reports seeing bright red blood per rectum this week. It was a lot in nature.  No rectal pain.  Patient's last colonoscopy 2018 with many small mouth diverticula in the left colon, 2 polyps of the rectum found, these were hyperplastic in nature.       Family history of colon polyps  Mother has had colon polyps      GERD  Pt is having acid reflux daily and nightly.  Has noticed issues swallowing rice.  Pt denies taking her Omeprazole as directed.  Pt is taking Mobic once daily    Past Medical History:   Diagnosis Date   • Disease of thyroid gland    • Diverticulosis    • Family history of colonic polyps    • GERD (gastroesophageal reflux disease)    • History of colon polyps    • Hyperlipidemia    • Lobular carcinoma of breast (HCC) 2022   • Narcolepsy    • Rheumatoid arthritis (HCC)        Past Surgical History:   Procedure Laterality Date   •  SECTION     • CHOLECYSTECTOMY     • COLONOSCOPY N/A 2018    Diverticulosis in the left colon; Two 4-5mm hyperplastic polyps in the rectum; Repeat 5 years   • HYSTERECTOMY     • MASTECTOMY Bilateral 2022   • OOPHORECTOMY          Current Outpatient Medications:   •  albuterol (PROVENTIL HFA;VENTOLIN HFA) 108 (90 Base) MCG/ACT inhaler, Inhale 2 puffs Every 4 (Four) Hours As Needed for Wheezing., Disp: 1 inhaler, Rfl: 5  •  aspirin 325 MG tablet, Take 1 tablet by mouth Daily. As needed, Disp: , Rfl:   •   aspirin 81 MG EC tablet, Take 1 tablet by mouth Daily., Disp: , Rfl:   •  carvedilol (COREG) 25 MG tablet, Take 1 tablet by mouth 2 (Two) Times a Day., Disp: , Rfl:   •  Entresto 24-26 MG tablet, Take 1 tablet by mouth 2 (Two) Times a Day., Disp: , Rfl:   •  Fiber powder, Take  by mouth. 1 tablespoon daily, Disp: , Rfl:   •  folic acid (FOLVITE) 1 MG tablet, Take 1 tablet by mouth Daily., Disp: , Rfl:   •  furosemide (LASIX) 20 MG tablet, Take 1 tablet by mouth 2 (Two) Times a Day., Disp: , Rfl:   •  letrozole (FEMARA) 2.5 MG tablet, Take 1 tablet by mouth Daily., Disp: , Rfl:   •  levothyroxine (SYNTHROID) 200 MCG tablet, Take 1 tablet by mouth Daily., Disp: 60 tablet, Rfl: 0  •  levothyroxine (SYNTHROID, LEVOTHROID) 25 MCG tablet, Take 1 tablet by mouth Daily., Disp: , Rfl:   •  meloxicam (MOBIC) 15 MG tablet, Take 1 tablet by mouth Daily., Disp: , Rfl:   •  metFORMIN (GLUCOPHAGE) 500 MG tablet, Take 1 tablet by mouth 2 (Two) Times a Day., Disp: , Rfl:   •  methotrexate 2.5 MG tablet, Take 8 tablets by mouth 1 (One) Time Per Week., Disp: , Rfl:   •  multivitamin with minerals tablet tablet, Take 1 tablet by mouth Daily., Disp: , Rfl:   •  spironolactone (ALDACTONE) 25 MG tablet, Take 1 tablet by mouth Daily., Disp: , Rfl:   •  tiotropium bromide-olodaterol (Stiolto Respimat) 2.5-2.5 MCG/ACT aerosol solution inhaler, INHALE 2 PUFFS BY MOUTH ONCE DAILY FOR 30 DAYS, Disp: , Rfl:   •  Zinc 50 MG tablet, Take 1 tablet by mouth Daily., Disp: , Rfl:   •  Anoro Ellipta 62.5-25 MCG/ACT aerosol powder  inhaler, Inhale 1 puff Daily., Disp: , Rfl:   •  aspirin 81 MG EC tablet, Take 81 mg by mouth Daily., Disp: , Rfl:   •  DICLOFENAC PO, Take 75 mg by mouth Daily. 1-2 daily, Disp: , Rfl:   •  glucosamine-chondroitin 500-400 MG capsule capsule, Take 1 capsule by mouth 2 (Two) Times a Day With Meals., Disp: , Rfl:   •  omeprazole (priLOSEC) 20 MG capsule, Take 1 capsule by mouth Daily., Disp: 90 capsule, Rfl: 1  •  polyethyl  "glycol-propyl glycol (SYSTANE) 0.4-0.3 % solution ophthalmic solution, Every 1 (One) Hour As Needed., Disp: , Rfl:   •  simvastatin (ZOCOR) 20 MG tablet, Take 30 mg by mouth Every Night., Disp: , Rfl:   •  sodium-potassium-magnesium sulfates (Suprep Bowel Prep Kit) 17.5-3.13-1.6 GM/177ML solution oral solution, Take 1 bottle by mouth Every 12 (Twelve) Hours., Disp: 354 mL, Rfl: 0    Allergies   Allergen Reactions   • Contrast Dye (Echo Or Unknown Ct/Mr) Anaphylaxis   • Sulfa Antibiotics Rash       Social History     Socioeconomic History   • Marital status:    Tobacco Use   • Smoking status: Former   • Smokeless tobacco: Never   • Tobacco comments:     Quit in 2004   Substance and Sexual Activity   • Alcohol use: No   • Drug use: No   • Sexual activity: Defer       Family History   Problem Relation Age of Onset   • Arthritis Mother    • Colon polyps Mother    • Arthritis Father    • No Known Problems Sister    • Breast cancer Maternal Aunt    • Depression Maternal Grandmother    • Depression Paternal Grandfather    • Narcolepsy Daughter    • Breast cancer Maternal Cousin        Review of Systems   Constitutional: Negative for unexpected weight change.   Respiratory: Negative for shortness of breath.    Cardiovascular: Negative for chest pain.       Objective     /70   Pulse 70   Temp 98.2 °F (36.8 °C)   Ht 162.6 cm (64\")   Wt 104 kg (228 lb 12.8 oz)   SpO2 99%   Breastfeeding No   BMI 39.27 kg/m²     Physical Exam  Vitals reviewed.   Constitutional:       Appearance: Normal appearance.   Cardiovascular:      Rate and Rhythm: Normal rate and regular rhythm.      Heart sounds: Normal heart sounds.   Pulmonary:      Effort: Pulmonary effort is normal.      Breath sounds: Normal breath sounds.   Neurological:      Mental Status: She is alert.         Lab Results - Last 18 Months   Lab Units 04/08/22  1109 04/01/22  1320   GLUCOSE mg/dL 154* 136*   BUN mg/dL  --  13   CREATININE mg/dL  --  0.5 "   SODIUM mmol/L  --  144   POTASSIUM mmol/L  --  4.0   CHLORIDE mmol/L  --  102   TOTAL CO2 mmol/L  --  27       Lab Results - Last 18 Months   Lab Units 10/10/22  1054 04/21/22  1118 03/03/22  0910   HEMOGLOBIN g/dL 11.9 13.0 13.7   HEMATOCRIT % 36.7 40.3 41.4   MCV fL 97.1* 97.3* 101.0*   WBC K/uL 6.77 11.30* 8.60   RDW % 15.9* 13.3 13.4   MPV fL 11.6* 10.8* 11.3*   PLATELETS K/uL 158* 176* 175*           IMPRESSION/PLAN:    Assessment & Plan      Problem List Items Addressed This Visit        Family History    Family history of polyps in the colon    Overview     Mother            Gastrointestinal Abdominal     Colon polyps - Primary    Overview     last colonoscopy 4/12/2018 with many small mouth diverticula in the left colon, 2 polyps of the rectum found, these were hyperplastic in nature.         Relevant Medications    sodium-potassium-magnesium sulfates (Suprep Bowel Prep Kit) 17.5-3.13-1.6 GM/177ML solution oral solution    GERD (gastroesophageal reflux disease)    Relevant Medications    sodium-potassium-magnesium sulfates (Suprep Bowel Prep Kit) 17.5-3.13-1.6 GM/177ML solution oral solution    Esophageal dysphagia    Overview     Trouble swallowing rice         Relevant Medications    sodium-potassium-magnesium sulfates (Suprep Bowel Prep Kit) 17.5-3.13-1.6 GM/177ML solution oral solution    Rectal bleeding    Relevant Medications    sodium-potassium-magnesium sulfates (Suprep Bowel Prep Kit) 17.5-3.13-1.6 GM/177ML solution oral solution    Abdominal pain    Overview     Generalized, pt very vague in her description of symptoms         Diarrhea    Overview     Post prandially associated with urgency            Health Encounters    Encounter for long-term (current) use of NSAIDs    Overview     Takes Mobic daily since 2017          Endoscopy and Colonoscopy per Dr Colton Taylor Prep             ..The risks, benefits, and alternatives of colonoscopy were reviewed with the patient today.  Risks including  perforation of the colon possibly requiring surgery or colostomy.  Additional risks include risk of bleeding from biopsies or removal of colon tissue.  There is also the risk of a drug reaction or problems with anesthesia.  This will be discussed with the further by the anesthesia team on the day of the procedure.  Lastly there is a possibility of missing a colon polyp or cancer.  The benefits include the diagnosis and management of disease of the colon and rectum.  Alternatives to colonoscopy include barium enema, laboratory testing, radiographic evaluation, or no intervention.  The patient verbalizes understanding and agrees.    In accordance with requirements under the Affordable Care Act, [facility] has provided pricing for all hospital services and items on each of its websites. However, a patient's actual cost may differ based on the services the patient receives to meet individual healthcare needs and based on the benefits provided under the patient’s insurance coverage.        Trista Eason, APRN  03/30/23  10:08 CDT    Part of this note may be an electronic transcription/translation of spoken language to printed text.

## 2023-04-03 ENCOUNTER — TELEPHONE (OUTPATIENT)
Dept: GASTROENTEROLOGY | Facility: CLINIC | Age: 58
End: 2023-04-03
Payer: COMMERCIAL

## 2023-04-27 ENCOUNTER — ANESTHESIA (OUTPATIENT)
Dept: GASTROENTEROLOGY | Facility: HOSPITAL | Age: 58
End: 2023-04-27
Payer: COMMERCIAL

## 2023-04-27 ENCOUNTER — HOSPITAL ENCOUNTER (OUTPATIENT)
Facility: HOSPITAL | Age: 58
Setting detail: HOSPITAL OUTPATIENT SURGERY
Discharge: HOME | End: 2023-04-27
Attending: INTERNAL MEDICINE | Admitting: INTERNAL MEDICINE
Payer: COMMERCIAL

## 2023-04-27 ENCOUNTER — ANESTHESIA EVENT (OUTPATIENT)
Dept: GASTROENTEROLOGY | Facility: HOSPITAL | Age: 58
End: 2023-04-27
Payer: COMMERCIAL

## 2023-04-27 VITALS
SYSTOLIC BLOOD PRESSURE: 127 MMHG | BODY MASS INDEX: 36.88 KG/M2 | RESPIRATION RATE: 15 BRPM | DIASTOLIC BLOOD PRESSURE: 70 MMHG | TEMPERATURE: 97.6 F | HEIGHT: 64 IN | WEIGHT: 216 LBS | OXYGEN SATURATION: 99 % | HEART RATE: 68 BPM

## 2023-04-27 DIAGNOSIS — K63.5 POLYP OF COLON, UNSPECIFIED PART OF COLON, UNSPECIFIED TYPE: ICD-10-CM

## 2023-04-27 DIAGNOSIS — K21.9 GASTROESOPHAGEAL REFLUX DISEASE, UNSPECIFIED WHETHER ESOPHAGITIS PRESENT: ICD-10-CM

## 2023-04-27 DIAGNOSIS — K62.5 RECTAL BLEEDING: ICD-10-CM

## 2023-04-27 DIAGNOSIS — R13.19 ESOPHAGEAL DYSPHAGIA: ICD-10-CM

## 2023-04-27 LAB — GLUCOSE BLDC GLUCOMTR-MCNC: 163 MG/DL (ref 70–130)

## 2023-04-27 PROCEDURE — 25010000002 PROPOFOL 10 MG/ML EMULSION: Performed by: NURSE ANESTHETIST, CERTIFIED REGISTERED

## 2023-04-27 PROCEDURE — 88305 TISSUE EXAM BY PATHOLOGIST: CPT | Performed by: INTERNAL MEDICINE

## 2023-04-27 PROCEDURE — 82962 GLUCOSE BLOOD TEST: CPT

## 2023-04-27 PROCEDURE — C1726 CATH, BAL DIL, NON-VASCULAR: HCPCS | Performed by: INTERNAL MEDICINE

## 2023-04-27 RX ORDER — OMEPRAZOLE 40 MG/1
40 CAPSULE, DELAYED RELEASE ORAL DAILY
Qty: 30 CAPSULE | Refills: 11 | Status: SHIPPED | OUTPATIENT
Start: 2023-04-27

## 2023-04-27 RX ORDER — SODIUM CHLORIDE 0.9 % (FLUSH) 0.9 %
10 SYRINGE (ML) INJECTION AS NEEDED
Status: DISCONTINUED | OUTPATIENT
Start: 2023-04-27 | End: 2023-04-27 | Stop reason: HOSPADM

## 2023-04-27 RX ORDER — LIDOCAINE HYDROCHLORIDE 10 MG/ML
0.5 INJECTION, SOLUTION EPIDURAL; INFILTRATION; INTRACAUDAL; PERINEURAL ONCE AS NEEDED
Status: DISCONTINUED | OUTPATIENT
Start: 2023-04-27 | End: 2023-04-27 | Stop reason: HOSPADM

## 2023-04-27 RX ORDER — MULTIVITAMIN WITH IRON
1 TABLET ORAL DAILY
COMMUNITY

## 2023-04-27 RX ORDER — LIDOCAINE HYDROCHLORIDE 20 MG/ML
INJECTION, SOLUTION EPIDURAL; INFILTRATION; INTRACAUDAL; PERINEURAL AS NEEDED
Status: DISCONTINUED | OUTPATIENT
Start: 2023-04-27 | End: 2023-04-27 | Stop reason: SURG

## 2023-04-27 RX ORDER — PROPOFOL 10 MG/ML
VIAL (ML) INTRAVENOUS AS NEEDED
Status: DISCONTINUED | OUTPATIENT
Start: 2023-04-27 | End: 2023-04-27 | Stop reason: SURG

## 2023-04-27 RX ORDER — SODIUM CHLORIDE 9 MG/ML
500 INJECTION, SOLUTION INTRAVENOUS CONTINUOUS PRN
Status: DISCONTINUED | OUTPATIENT
Start: 2023-04-27 | End: 2023-04-27 | Stop reason: HOSPADM

## 2023-04-27 RX ADMIN — LIDOCAINE HYDROCHLORIDE 200 MG: 20 INJECTION, SOLUTION EPIDURAL; INFILTRATION; INTRACAUDAL; PERINEURAL at 09:27

## 2023-04-27 RX ADMIN — SODIUM CHLORIDE 500 ML: 9 INJECTION, SOLUTION INTRAVENOUS at 08:43

## 2023-04-27 RX ADMIN — PROPOFOL INJECTABLE EMULSION 700 MG: 10 INJECTION, EMULSION INTRAVENOUS at 09:27

## 2023-04-27 RX ADMIN — GLYCOPYRROLATE 0.1 MG: 0.2 INJECTION INTRAMUSCULAR; INTRAVENOUS at 09:24

## 2023-04-27 NOTE — ANESTHESIA PREPROCEDURE EVALUATION
Anesthesia Evaluation     Patient summary reviewed   no history of anesthetic complications:  NPO Solid Status: > 8 hours             Airway   Mallampati: II  Dental    (+) upper dentures and lower dentures    Pulmonary    (+) asthma,  Cardiovascular   Exercise tolerance: good (4-7 METS)    (+) hypertension, CHF Systolic <55%, hyperlipidemia,       Neuro/Psych- negative ROS  GI/Hepatic/Renal/Endo    (+) morbid obesity, GERD,  diabetes mellitus, thyroid problem     Musculoskeletal     Abdominal    Substance History      OB/GYN          Other                        Anesthesia Plan    ASA 3     MAC       Anesthetic plan, risks, benefits, and alternatives have been provided, discussed and informed consent has been obtained with: patient.        CODE STATUS:

## 2023-04-28 DIAGNOSIS — C50.919 INFILTRATING LOBULAR CARCINOMA OF BREAST IN FEMALE (HCC): ICD-10-CM

## 2023-04-28 DIAGNOSIS — Z17.0 CARCINOMA OF RIGHT BREAST, ESTROGEN AND PROGESTERONE RECEPTOR POSITIVE (HCC): ICD-10-CM

## 2023-04-28 DIAGNOSIS — C50.911 CARCINOMA OF RIGHT BREAST, ESTROGEN AND PROGESTERONE RECEPTOR POSITIVE (HCC): ICD-10-CM

## 2023-04-28 LAB
CYTO UR: NORMAL
LAB AP CASE REPORT: NORMAL
Lab: NORMAL
PATH REPORT.FINAL DX SPEC: NORMAL
PATH REPORT.GROSS SPEC: NORMAL

## 2023-04-28 RX ORDER — LETROZOLE 2.5 MG/1
TABLET, FILM COATED ORAL
Qty: 90 TABLET | Refills: 0 | Status: SHIPPED | OUTPATIENT
Start: 2023-04-28

## 2023-05-03 DIAGNOSIS — C50.919 INFILTRATING LOBULAR CARCINOMA OF BREAST IN FEMALE (HCC): Primary | ICD-10-CM

## 2023-05-04 ENCOUNTER — OFFICE VISIT (OUTPATIENT)
Dept: HEMATOLOGY | Age: 58
End: 2023-05-04
Payer: COMMERCIAL

## 2023-05-04 ENCOUNTER — HOSPITAL ENCOUNTER (OUTPATIENT)
Dept: INFUSION THERAPY | Age: 58
Discharge: HOME | End: 2023-05-04
Payer: COMMERCIAL

## 2023-05-04 VITALS
DIASTOLIC BLOOD PRESSURE: 80 MMHG | HEIGHT: 64 IN | BODY MASS INDEX: 38.07 KG/M2 | WEIGHT: 223 LBS | SYSTOLIC BLOOD PRESSURE: 124 MMHG | OXYGEN SATURATION: 96 % | HEART RATE: 89 BPM

## 2023-05-04 DIAGNOSIS — C50.919 INFILTRATING LOBULAR CARCINOMA OF BREAST IN FEMALE (HCC): ICD-10-CM

## 2023-05-04 DIAGNOSIS — Z15.89 CHEK2-RELATED BREAST CANCER (HCC): ICD-10-CM

## 2023-05-04 DIAGNOSIS — C50.919 CHEK2-RELATED BREAST CANCER (HCC): ICD-10-CM

## 2023-05-04 DIAGNOSIS — Z91.89 AT RISK FOR BONE DENSITY LOSS: ICD-10-CM

## 2023-05-04 DIAGNOSIS — T45.1X5A HOT FLASHES RELATED TO AROMATASE INHIBITOR THERAPY: ICD-10-CM

## 2023-05-04 DIAGNOSIS — R23.2 HOT FLASHES RELATED TO AROMATASE INHIBITOR THERAPY: ICD-10-CM

## 2023-05-04 DIAGNOSIS — Z15.02 CHEK2-RELATED BREAST CANCER (HCC): ICD-10-CM

## 2023-05-04 DIAGNOSIS — Z15.09 CHEK2-RELATED BREAST CANCER (HCC): ICD-10-CM

## 2023-05-04 DIAGNOSIS — C50.919 INFILTRATING LOBULAR CARCINOMA OF BREAST IN FEMALE (HCC): Primary | ICD-10-CM

## 2023-05-04 DIAGNOSIS — Z79.811 AROMATASE INHIBITOR USE: ICD-10-CM

## 2023-05-04 LAB
BASOPHILS # BLD: 0.06 K/UL (ref 0.01–0.08)
BASOPHILS NFR BLD: 0.7 % (ref 0.1–1.2)
EOSINOPHIL # BLD: 0.35 K/UL (ref 0.04–0.54)
EOSINOPHIL NFR BLD: 3.9 % (ref 0.7–7)
ERYTHROCYTE [DISTWIDTH] IN BLOOD BY AUTOMATED COUNT: 13.7 % (ref 11.7–14.4)
HCT VFR BLD AUTO: 40.8 % (ref 34.1–44.9)
HGB BLD-MCNC: 12.6 G/DL (ref 11.2–15.7)
LYMPHOCYTES # BLD: 2.35 K/UL (ref 1.18–3.74)
LYMPHOCYTES NFR BLD: 26.4 % (ref 19.3–53.1)
MCH RBC QN AUTO: 33 PG (ref 25.6–32.2)
MCHC RBC AUTO-ENTMCNC: 30.9 G/DL (ref 32.3–35.5)
MCV RBC AUTO: 106.8 FL (ref 79.4–94.8)
MONOCYTES # BLD: 0.63 K/UL (ref 0.24–0.82)
MONOCYTES NFR BLD: 7.1 % (ref 4.7–12.5)
NEUTROPHILS # BLD: 5.46 K/UL (ref 1.56–6.13)
NEUTS SEG NFR BLD: 61.5 % (ref 34–71.1)
PLATELET # BLD AUTO: 162 K/UL (ref 182–369)
PMV BLD AUTO: 12.1 FL (ref 7.4–10.4)
RBC # BLD AUTO: 3.82 M/UL (ref 3.93–5.22)
WBC # BLD AUTO: 8.89 K/UL (ref 3.98–10.04)

## 2023-05-04 PROCEDURE — 99211 OFF/OP EST MAY X REQ PHY/QHP: CPT

## 2023-05-04 PROCEDURE — 85025 COMPLETE CBC W/AUTO DIFF WBC: CPT

## 2023-05-04 PROCEDURE — 99214 OFFICE O/P EST MOD 30 MIN: CPT | Performed by: NURSE PRACTITIONER

## 2023-05-04 PROCEDURE — 36415 COLL VENOUS BLD VENIPUNCTURE: CPT

## 2023-05-09 ASSESSMENT — ENCOUNTER SYMPTOMS
RESPIRATORY NEGATIVE: 1
NAUSEA: 0
CONSTIPATION: 0
WHEEZING: 0
BACK PAIN: 0
ABDOMINAL PAIN: 0
GASTROINTESTINAL NEGATIVE: 1
BLOOD IN STOOL: 0
DIARRHEA: 0
VOMITING: 0
EYE DISCHARGE: 0
EYE PAIN: 0
EYES NEGATIVE: 1
EYE REDNESS: 0
SORE THROAT: 0
COUGH: 0
SHORTNESS OF BREATH: 0

## 2023-06-01 DIAGNOSIS — C50.919 INFILTRATING LOBULAR CARCINOMA OF BREAST IN FEMALE (HCC): Primary | ICD-10-CM

## 2023-06-28 ENCOUNTER — TELEPHONE (OUTPATIENT)
Dept: SURGERY | Age: 58
End: 2023-06-28

## 2023-07-18 DIAGNOSIS — C50.911 CARCINOMA OF RIGHT BREAST, ESTROGEN AND PROGESTERONE RECEPTOR POSITIVE (HCC): ICD-10-CM

## 2023-07-18 DIAGNOSIS — Z17.0 CARCINOMA OF RIGHT BREAST, ESTROGEN AND PROGESTERONE RECEPTOR POSITIVE (HCC): ICD-10-CM

## 2023-07-18 DIAGNOSIS — C50.919 INFILTRATING LOBULAR CARCINOMA OF BREAST IN FEMALE (HCC): ICD-10-CM

## 2023-07-18 RX ORDER — LETROZOLE 2.5 MG/1
TABLET, FILM COATED ORAL
Qty: 90 TABLET | Refills: 0 | Status: SHIPPED | OUTPATIENT
Start: 2023-07-18

## 2023-07-27 DIAGNOSIS — C50.919 INFILTRATING LOBULAR CARCINOMA OF BREAST IN FEMALE (HCC): ICD-10-CM

## 2023-07-27 DIAGNOSIS — N63.0 BREAST NODULE: Primary | ICD-10-CM

## 2023-08-03 ENCOUNTER — HOSPITAL ENCOUNTER (OUTPATIENT)
Dept: WOMENS IMAGING | Age: 58
Discharge: HOME | End: 2023-08-03
Attending: SURGERY
Payer: COMMERCIAL

## 2023-08-03 DIAGNOSIS — C50.919 INFILTRATING LOBULAR CARCINOMA OF BREAST IN FEMALE (HCC): ICD-10-CM

## 2023-08-03 DIAGNOSIS — N63.0 BREAST NODULE: ICD-10-CM

## 2023-08-03 PROCEDURE — 76642 ULTRASOUND BREAST LIMITED: CPT

## 2023-08-10 NOTE — ANESTHESIA POSTPROCEDURE EVALUATION
"Patient: Jordy Mir    Procedure Summary     Date: 04/27/23 Room / Location: [facility] ENDOSCOPY 2 /  [facility]    Anesthesia Start: 0923 Anesthesia Stop: 0959    Procedures:       ESOPHAGOGASTRODUODENOSCOPY WITH ANESTHESIA      COLONOSCOPY WITH ANESTHESIA Diagnosis:       Polyp of colon, unspecified part of colon, unspecified type      Gastroesophageal reflux disease, unspecified whether esophagitis present      Esophageal dysphagia      Rectal bleeding      (Polyp of colon, unspecified part of colon, unspecified type [K63.5])      (Gastroesophageal reflux disease, unspecified whether esophagitis present [K21.9])      (Esophageal dysphagia [R13.19])      (Rectal bleeding [K62.5])    Surgeons: Mary Segura MD Provider: Deangelo Ricks CRNA    Anesthesia Type: MAC ASA Status: 3          Anesthesia Type: MAC    Vitals  Vitals Value Taken Time   /69 04/27/23 0957   Temp     Pulse 76 04/27/23 0959   Resp 20 04/27/23 0957   SpO2 99 % 04/27/23 0959   Vitals shown include unvalidated device data.        Post Anesthesia Care and Evaluation    Patient location during evaluation: PHASE II  Patient participation: complete - patient participated  Level of consciousness: awake and alert  Pain score: 0  Pain management: adequate    Airway patency: patent  Anesthetic complications: No anesthetic complications  PONV Status: none  Cardiovascular status: acceptable  Respiratory status: acceptable  Hydration status: acceptable    Comments: Blood pressure 126/69, pulse 75, temperature 97.6 °F (36.4 °C), temperature source Temporal, resp. rate 20, height 162.6 cm (64\"), weight 98 kg (216 lb), SpO2 99 %, not currently breastfeeding.    Pt discharged from PACU based on lamar score >8      "
No

## 2023-08-18 ENCOUNTER — OFFICE VISIT (OUTPATIENT)
Dept: SURGERY | Age: 58
End: 2023-08-18
Payer: COMMERCIAL

## 2023-08-18 ENCOUNTER — TELEPHONE (OUTPATIENT)
Dept: OTHER | Age: 58
End: 2023-08-18

## 2023-08-18 VITALS
BODY MASS INDEX: 36.43 KG/M2 | HEIGHT: 64 IN | WEIGHT: 213.4 LBS | HEART RATE: 76 BPM | OXYGEN SATURATION: 97 % | TEMPERATURE: 98.1 F

## 2023-08-18 DIAGNOSIS — C50.919 INFILTRATING LOBULAR CARCINOMA OF BREAST IN FEMALE (HCC): Primary | Chronic | ICD-10-CM

## 2023-08-18 PROCEDURE — 99213 OFFICE O/P EST LOW 20 MIN: CPT | Performed by: SURGERY

## 2023-08-18 ASSESSMENT — ENCOUNTER SYMPTOMS
ABDOMINAL PAIN: 0
EYE REDNESS: 0
DIARRHEA: 0
CONSTIPATION: 0
BACK PAIN: 0
ABDOMINAL DISTENTION: 0
EYE PAIN: 0
CHEST TIGHTNESS: 0
NAUSEA: 0
COUGH: 0
SORE THROAT: 0
VOMITING: 0
COLOR CHANGE: 0
SHORTNESS OF BREATH: 0

## 2023-08-18 NOTE — PROGRESS NOTES
and reactive to light. Cardiovascular:      Rate and Rhythm: Normal rate and regular rhythm. Pulmonary:      Effort: Pulmonary effort is normal.      Breath sounds: Normal breath sounds. No wheezing or rales. Chest:   Breasts:     Right: Absent. Left: Absent. Comments: B/l mastectomy scars well healed. No overlying skin changes. Right breast skin changes due to radiation therapy. No erythema. Abdominal:      General: Bowel sounds are normal. There is no distension. Palpations: Abdomen is soft. Musculoskeletal:         General: Normal range of motion. Cervical back: Normal range of motion and neck supple. Lymphadenopathy:      Cervical: No cervical adenopathy. Upper Body:      Right upper body: No supraclavicular or axillary adenopathy. Left upper body: No supraclavicular or axillary adenopathy. Skin:     General: Skin is warm and dry. Neurological:      Mental Status: She is alert and oriented to person, place, and time. Psychiatric:         Behavior: Behavior normal.         Thought Content: Thought content normal.         Judgment: Judgment normal.       7/27/23 Left Breast US   BI-RADS Category 2   The patient's information has been added to a  reminder system with a target due date for the next mammogram.  Signed by Dr Emma More    ASSESSMENT:   Diagnosis Orders   1. Infiltrating lobular carcinoma of breast in female [facility])            PLAN:  No orders of the defined types were placed in this encounter. No orders of the defined types were placed in this encounter. Doing well. Will see back in January for repeat physical exam.     Continue letrozole. Continue oncology f/u. Return in about 5 months (around 1/18/2024).     Shannan Amos DO 9/31/4651 11:43 AM

## 2023-10-10 ENCOUNTER — TELEPHONE (OUTPATIENT)
Dept: HEMATOLOGY | Age: 58
End: 2023-10-10

## 2023-10-10 NOTE — TELEPHONE ENCOUNTER
I called patient to remind them of their appointment on 10/12/2023 and had to leave a vm. Detailed vm was left for their upcoming appt and made them aware to call office if unable to keep this appointment. Patient was given date & time of appt.

## 2023-10-11 NOTE — PROGRESS NOTES
Progress Note      Pt Name: Ervin Ho  YOB: 1965  MRN: [id number]    Date of evaluation: 10/12/2023  History Obtained From:  patient, electronic medical record    CHIEF COMPLAINT:    Chief Complaint   Patient presents with    Follow-up     Infiltrating lobular carcinoma of breast in female      HISTORY OF PRESENT ILLNESS:    Ervin Ho is a 66 y.o.  female who is currently being followed for infiltrating lobular carcinoma of the right breast, ER/OR positive, HER2 negative, grade 2, diagnosed February 2022. She is status post bilateral mastectomy, adjuvant radiation therapy and current recommendation is for adjuvant endocrine therapy with letrozole 2.5 mg for anticipated 5 years through March 2027. She has had no known evidence to suggest recurrent disease. Tamiko Alonzo returns today in scheduled follow-up for evaluation for monitoring, side effect monitoring and further treatment recommendations. She reports being compliant with the letrozole and has no new chest wall complaints. Today's clinic visit to include physical assessment, review of systems, any lab or radiographic findings that were available and plan of care are documented below. ONCOLOGIC HISTORY:     Diagnosis  Infiltrating lobular carcinoma, right breast, Feb 2022  Favor grade 2  ER 65%, OR 65%, HER-2 negative, Ki67 8%  [id number]  CHEK-2 mutation, April 2022  Oncotype DX Recurrence Score 19     Treatment Summary  3/3/22 Initiate endocrine hormone therapy with Letrozole 2.5mg daily   4/8/2022 Bilateral Mastectomy  Anticipate chemotherapy  7/5/22-8/30/22 Completed radiation therapy 5040 cGy over 28 treatments to Rt. Axilla     Fausto Yanez was first seen by Dr Beth Lindsey on 3/3/2022. The patient was referred by Dr. Claudia Pitts for diagnosis of breast cancer. The patient noticed an abnormality in the skin of the right breast.  She missed mammogram in 2021 due to COVID-19.   She had mammogram in

## 2023-10-12 ENCOUNTER — HOSPITAL ENCOUNTER (OUTPATIENT)
Dept: INFUSION THERAPY | Age: 58
Discharge: HOME | End: 2023-10-12
Payer: COMMERCIAL

## 2023-10-12 ENCOUNTER — OFFICE VISIT (OUTPATIENT)
Dept: HEMATOLOGY | Age: 58
End: 2023-10-12
Payer: COMMERCIAL

## 2023-10-12 VITALS — OXYGEN SATURATION: 97 % | HEIGHT: 64 IN | WEIGHT: 212.6 LBS | HEART RATE: 66 BPM | BODY MASS INDEX: 36.29 KG/M2

## 2023-10-12 DIAGNOSIS — Z15.89 CHEK2-RELATED BREAST CANCER (HCC): ICD-10-CM

## 2023-10-12 DIAGNOSIS — C50.919 INFILTRATING LOBULAR CARCINOMA OF BREAST IN FEMALE (HCC): ICD-10-CM

## 2023-10-12 DIAGNOSIS — Z79.811 ENCOUNTER FOR MONITORING AROMATASE INHIBITOR THERAPY: ICD-10-CM

## 2023-10-12 DIAGNOSIS — Z91.89 AT RISK FOR BONE DENSITY LOSS: ICD-10-CM

## 2023-10-12 DIAGNOSIS — Z15.02 CHEK2-RELATED BREAST CANCER (HCC): ICD-10-CM

## 2023-10-12 DIAGNOSIS — C50.919 CHEK2-RELATED BREAST CANCER (HCC): ICD-10-CM

## 2023-10-12 DIAGNOSIS — C50.919 INFILTRATING LOBULAR CARCINOMA OF BREAST IN FEMALE (HCC): Primary | ICD-10-CM

## 2023-10-12 DIAGNOSIS — Z51.81 ENCOUNTER FOR MONITORING AROMATASE INHIBITOR THERAPY: ICD-10-CM

## 2023-10-12 DIAGNOSIS — Z15.09 CHEK2-RELATED BREAST CANCER (HCC): ICD-10-CM

## 2023-10-12 LAB
BASOPHILS # BLD: 0.03 K/UL (ref 0.01–0.08)
BASOPHILS NFR BLD: 0.2 % (ref 0.1–1.2)
EOSINOPHIL # BLD: 0.05 K/UL (ref 0.04–0.54)
EOSINOPHIL NFR BLD: 0.4 % (ref 0.7–7)
ERYTHROCYTE [DISTWIDTH] IN BLOOD BY AUTOMATED COUNT: 14.4 % (ref 11.7–14.4)
HCT VFR BLD AUTO: 35.4 % (ref 34.1–44.9)
HGB BLD-MCNC: 12.4 G/DL (ref 11.2–15.7)
LYMPHOCYTES # BLD: 1.22 K/UL (ref 1.18–3.74)
LYMPHOCYTES NFR BLD: 9.3 % (ref 19.3–53.1)
MCH RBC QN AUTO: 33 PG (ref 25.6–32.2)
MCHC RBC AUTO-ENTMCNC: 35 G/DL (ref 32.3–35.5)
MCV RBC AUTO: 94.1 FL (ref 79.4–94.8)
MONOCYTES # BLD: 0.6 K/UL (ref 0.24–0.82)
MONOCYTES NFR BLD: 4.6 % (ref 4.7–12.5)
NEUTROPHILS # BLD: 11.05 K/UL (ref 1.56–6.13)
NEUTS SEG NFR BLD: 84.1 % (ref 34–71.1)
PLATELET # BLD AUTO: 196 K/UL (ref 182–369)
PMV BLD AUTO: 11.7 FL (ref 7.4–10.4)
RBC # BLD AUTO: 3.76 M/UL (ref 3.93–5.22)
WBC # BLD AUTO: 13.14 K/UL (ref 3.98–10.04)

## 2023-10-12 PROCEDURE — 99211 OFF/OP EST MAY X REQ PHY/QHP: CPT

## 2023-10-12 PROCEDURE — 99213 OFFICE O/P EST LOW 20 MIN: CPT | Performed by: NURSE PRACTITIONER

## 2023-10-12 PROCEDURE — 85025 COMPLETE CBC W/AUTO DIFF WBC: CPT

## 2023-10-12 PROCEDURE — 36415 COLL VENOUS BLD VENIPUNCTURE: CPT

## 2023-10-12 RX ORDER — METHYLPREDNISOLONE 4 MG/1
TABLET ORAL
COMMUNITY
Start: 2023-10-09

## 2023-10-17 ASSESSMENT — ENCOUNTER SYMPTOMS
EYE DISCHARGE: 0
EYE PAIN: 0
CONSTIPATION: 0
DIARRHEA: 0
SHORTNESS OF BREATH: 0
GASTROINTESTINAL NEGATIVE: 1
NAUSEA: 0
SORE THROAT: 0
VOMITING: 0
ABDOMINAL PAIN: 0
BACK PAIN: 0
WHEEZING: 0
BLOOD IN STOOL: 0
RESPIRATORY NEGATIVE: 1
EYES NEGATIVE: 1
EYE REDNESS: 0
COUGH: 0

## 2023-10-27 DIAGNOSIS — C50.911 CARCINOMA OF RIGHT BREAST, ESTROGEN AND PROGESTERONE RECEPTOR POSITIVE (HCC): ICD-10-CM

## 2023-10-27 DIAGNOSIS — C50.919 INFILTRATING LOBULAR CARCINOMA OF BREAST IN FEMALE (HCC): ICD-10-CM

## 2023-10-27 DIAGNOSIS — Z17.0 CARCINOMA OF RIGHT BREAST, ESTROGEN AND PROGESTERONE RECEPTOR POSITIVE (HCC): ICD-10-CM

## 2023-10-30 RX ORDER — LETROZOLE 2.5 MG/1
TABLET, FILM COATED ORAL
Qty: 90 TABLET | Refills: 0 | Status: SHIPPED | OUTPATIENT
Start: 2023-10-30

## 2023-11-01 ENCOUNTER — HOSPITAL ENCOUNTER (OUTPATIENT)
Dept: RADIATION ONCOLOGY | Facility: HOSPITAL | Age: 58
Setting detail: RADIATION/ONCOLOGY SERIES
End: 2023-11-01
Payer: COMMERCIAL

## 2023-11-01 NOTE — PROGRESS NOTES
[facility]ell[facility] MD      Kosta Reid APRN  _______________________________________________  [facility]  Department of Radiation Oncology  [address] [place] [id number]  Office: [phone]  Fax: [phone]    DATE:  11/02/2023  PATIENT: Jordy Mir  1965                         MEDICAL RECORD #:  [id number]                                                       Reason for Visit:   Chief Complaint   Patient presents with    Breast Cancer     Jordy Mir is a very pleasant 58 y.o. patient that has completed radiation therapy for carcinoma of the breast and returns to the clinic today for routine follow up exam. Denies activity change, appetite change, unexpected weight change, nasuea/vomiting, diarrhea, light-headedness, weakness, and headaches. She follows .     HISTORY OF PRESENT ILLNESS  Stage IIA (T2, N1a, cM0, G1) ER/NC+ Infiltrating Lobular Carcinoma of right breast. Underwent bilateral mastectomy on 4/8/2022, revealing ILC 3.2 cm 2/3 right SLNs positive. She completed 5040 cGy in 28 fractions to the right chest wall on 08/30/2022.    [place]09[place] [facility] [place]  The breast tissue is heterogeneously dense. This may lower the sensitivity of mammography.   There is heterogeneous increased density with distortion suggested in the upper outer right breast 9-10 o'clock.   On the right MLO slab 8/18 there is a possible 7mm nodule toward 9-10 o'clock deep in the breast.   No new abnormal density is noted in the left breast.   There are no suspicious appearing clustered microcalcifications or skin thickening.     [place] [facility] [place]  No lesion noted in the left breast.   In the right breast there is a heterogeneous process suggesting a mass at 10 o'clock. This measures 2.5 x 2.2 x 3.3cm. There is shadowing from this process. There is blood flow suggested in the process. The possible small nodule  noted in the right breast on the MLO projection, on the mammogram is not identified on ultrasound.     02/17/2022 - Breast, right breast needle core biopsies at 10 o'clock position:  Infiltrating lobular carcinoma, favor grade 2.   Invasive carcinoma measures 0.9 cm in greatest linear dimension and is present in multiple cores.   ER 65%, KY 65%, HER-2 negative, Ki67 8%.    03/01/2022 - MRI Bilateral Breasts with and without contrast:  Residual enhancing mass at the biopsy site in the right breast at 10:00 in the mid depth region. This is consistent with known lobular carcinoma. BI-RADS 6.   There is an additional nodule located centrally in the right breast at 12-1:00 in the mid depth region with similar enhancing characteristics and likely representing an additional site of lobular neoplasm. Consider second look ultrasound if it might alter therapeutic decisions.   There is a smoothly marginated 6 mm nodule located close to the left nipple at 9:00 with persistent type I enhancement. This is likely a fibroadenoma or papilloma. Consider second look ultrasound. BI-RADS 3.   No suspicious-appearing lymph nodes.     03/03/2022 - Initiate endocrine hormone therapy with Letrozole 2.5mg daily.     04/08/2022 - Bilateral mastectomies and right sentinel lymph node biopsy per :  Breast, right mastectomy:   Infiltrating lobular carcinoma, grade 1.   Infiltrating carcinoma measures 3.2 cm in greatest dimension grossly.   Infiltrating carcinoma is 2.0 cm from the nearest deep surgical excision margin.   Changes consistent with fibrocystic mastopathy involving nonneoplastic breast parenchyma.   Sections of nipple, negative for evidence of malignancy.   Skin, excision of right breast inferolateral skin:   Benign skin.   Breast, left mastectomy:   Benign breast parenchyma with changes consistent with fibrocystic mastopathy.   Sections of nipple and skin, negative for evidence of malignancy.   Lymph node, right sentinel  lymph node biopsy:   2 out of 3 lymph nodes, positive for metastatic lobular carcinoma.   Largest metastatic focus measures 0.7 cm in greatest dimension.   Indeterminate for extracapsular spread.     AJCC STAGE: pT2, (sn)pN1a, pMx     Surgical Pathology Cancer Case Summary   Protocol posting date: February 2020   INVASIVE CARCINOMA OF THE BREAST: Resection   Select a single response unless otherwise indicated.   Procedure, Laterality, and Site may be listed separately or on 1 line.   Procedure (Note A)   __x_ Total mastectomy (including nipple-sparing and skin-sparing mastectomy)   Specimen Laterality   _x__ Right   Tumor Size (Note C)   _x__ Greatest dimension of largest invasive focus >1 mm (specify exact measurement) (millimeters): __32_ mm bkmkcolf0bkmkcolf0_heading=h.32n4qml   Histologic Type (Note D)   __x_ Invasive lobular carcinoma   Histologic Grade ([place] Histologic Score) (Note E) Glandular (Acinar)/Tubular Differentiation   __x_ Score 3 (<10% of tumor area forming glandular/tubular structures)   Nuclear Pleomorphism   _x__ Score 1 (nuclei small with little increase in size in comparison with normal breast epithelial cells, regular outlines, uniform nuclear chromatin, little variation in size) bkmkcolf0bkmkcolf0_heading=h.8dvy8omEacrurs Rate (see Table 1)   _x__ Score 1   Overall Grade   __x_ Grade 1 (scores of 3, 4, or 5)   ___ Grade 2 (scores of 6 or 7)   ___ Grade 3 (scores of 8 or 9)   ___ Only microinvasion present (not graded)   ___ No residual invasive carcinoma   ___ Score cannot be determined (explain: ________________)   + Tumor Focality (Note F)   + _x__ Single focus of invasive carcinoma   Ductal Carcinoma In Situ (DCIS) (Note G)   _x__ Not identified   + Lobular Carcinoma In Situ (LCIS)   + _x__ Present   Tumor Extension (required only if the structures are present and involved) (select all that apply) (Note H) Skin   _x__ Skin is present and uninvolved   Nipple   _x__ DCIS does not  involve the nipple epidermis   Note: This finding does not change the T classification of invasive carcinomas.   Skeletal Muscle   _x__ No skeletal muscle is present   Margins (Note I)   Invasive Carcinoma Margins (required only if residual invasive carcinoma is present in specimen)   _x__ Uninvolved by invasive carcinoma   Distance from closest margin (millimeters):   _x__ Specify _20__ mm   DCIS Margins (required only if DCIS is present in specimen)   _x__ Not applicable (no DCIS in specimen)   Regional Lymph Nodes (Note J)   _x__ Uninvolved by tumor cells   Total Number of Lymph Nodes Examined: __3__   Number of Charlotte Nodes Examined (if applicable): _3___   __x_ Involved by tumor cells   Number of Lymph Nodes with Macrometastases (>2 mm): __2__   Number of Lymph Nodes with Micrometastases (>0.2 mm to 2 mm and/or >200 cells): __0__   Number of Lymph Nodes with Isolated Tumor Cells (?0.2 mm or ?200 cells)#: _0___   # Reporting the number of lymph nodes with isolated tumor cells is required only in the absence of macrometastasis or micrometastasis in other lymph nodes. bkmkcolf0bkmkcolf0_heading=h.3vsdvk4Lhxy of Largest Metastatic Deposit (millimeters): _7___mm   Extranodal Extension   _x__ Cannot be determined   Treatment Effect in the Breast (Note K)   _x__ No known presurgical therapy   + Lymphovascular Invasion (Note L)   + _x__ Present   + Dermal Lymphovascular Invasion   + _x__ Not identified   Pathologic Stage Classification (pTNM, AJCC 8th Edition) (Note M)   Primary Tumor (pT)   _x__ pT2: Tumor >20 mm but ?50 mm in greatest dimension   Regional Lymph Nodes Modifier (required only if applicable)   _x__ (sn): Charlotte node(s) evaluated. If 6 or more nodes (sentinel or nonsentinel) are removed, this modifier should not be used.   Regional Lymph Nodes (pN) (choose a category based on lymph nodes received with the specimen; immunohistochemistry and/or molecular studies are not required)   _x__pN1a   Distant  Metastasis (pM) (required only if confirmed pathologically in this case)   Not applicable.     04/14/2022 - Appointment with :  Plan:  Continue any current medications  Wound care discussed. Continue to record drains. Drain number 2 removed today. Reviewed pathology. Will need XRT to right axilla given positive LN. Reviewed genetic testing. Will speak with Maddi, CHEK2 positive.  Pt is to increase activities as tolerated.   Usual diet  Follow up: 1 week.    04/21/2022 - Appointment with :  RTC with MD 4 weeks VV  Continue Letrozole 2.5mg daily  Continue Oxybutynin 5mg twice a day  Recommend genetic testing with Maddi Ibarra PA-C or Aleyda Talbert, Request Oncotype DX on pathology  Continue follow-up with Dr. Angie Yoder for surgery  Referral to radiation oncology for consideration of adjuvant radiation (positive note)  We will recommend to proceed with colonoscopy as well after completion of radiation    05/12/2022 - Consult with :  We have discussed the role of radiation therapy with this diagnosis as well as the indications and rationale of adjuvant radiation therapy according to the NCCN Guidelines. She has verbalized understanding of our conversation and agrees to the treatment recommendations for postoperative radiation therapy to the right chest wall and involved lymph nodes. following Simple Mastectomy. I anticipate a dose of 5040 cGy with 1000 cGy boost to the tumor bed for a total of 6040 cGy/33 fractions.. OncotypeDx results are pending. Should she need further systemic therapy, we will see her back for follow up and simulation to begin the treatment planning, final dose to be determined.  She will continue ongoing management per primary care physician and other specialists. Thank you for allowing me to assist in her care.   Plan:  We will wait and see if you need chemotherapy before radiation.  Plan on 33 daily treatments, Monday-Friday for 30 minutes each.  Side effects  may include fatigue, sunburn, blistering.    07/05/2022 - 08/30/2022 - Completed radiation course:  Received 5040 cGy in 28 fractions to the right chest wall via external beam radiation therapy.    10/10/2022 - Appointment with :  ILC ER 65%, MI 65%, HER-2 negative, Ki67 8%, [id number]  -S/p B/L mastectomy/LN sampling  Pathology:ILC G1, 3.2 cm, 2/3 LN (+)  Letrozole since 3/3/2022.   Discussed NCCN guidelines.   Oncotype DX-low recurrence score.  4/21/2022-Oncotype DX- RS 19  Patient is postmenopausal therefore recurrence score is low. I recommend no adjuvant chemotherapy.  Discussed with RT, P, Dr. Castellanos to proceed with -7/5/22-8/30/22 Completed radiation therapy 5040 cGy over 28 treatments to Rt. Axilla  PLAN:  RTC with Divya Shha, IVAN, 6 months  Continue Letrozole 2.5mg daily  Continue Oxybutynin 5mg twice a day   Repeat BMD June 2025  Continue follow up with Dr. Castellanos  Refer to Dr Mary Segura/[facility] for repeat colonoscopy April 2023  Continue follow-up with Dr Angie Yoder     11/04/2022 - Appointment with :  Follow up in one year     12/08/2022 - US Left breast:  Left breast, BI-RADS 1, negative.   There is no suspicious sonographic finding for a palpable area of concern in the left chest wall status post mastectomy.   Recommend clinical follow-up and any further management based on clinical assessment. As a courtesy to the patient, this was all discussed with her.   Overall assessment BI-RADS 1, negative.     08/18/2023 - Appointment with :  Doing well. Will see back in January for repeat physical exam.   Continue letrozole. Continue oncology f/u.   Return in about 5 months (around 1/18/2024).     08/03/2023 - US left breast:  Negative left breast ultrasound with a scar present. Follow-up as clinically indicated   BI-RADS Category 2   The patient's information has been added to a reminder system with a target due date for the next mammogram.     10/12/2023 - Appointment with  :  Infiltrating lobular carcinoma of right breast, ER 65%, ND 65%, HER-2 negative, Ki67 8%, [id number]. Status post bilateral mastectomy, adjuvant radiation therapy and current recommendation is for adjuvant endocrine therapy with letrozole 2.5 mg for anticipated 5 years through 2027.   She has had no known evidence to suggest recurrent disease.   Indicates compliant with letrozole daily with no significant side effects.  No bilateral chest wall complaints.  2023 Left breast ultrasound- Negative left breast ultrasound with a scar present.   Jordy was seen in scheduled follow-up by Dr Yoder on 2023, I reviewed the progress note from that office visit which documented exam with no new findings or concerns of recurrence.   Bilateral chest wall examination today revealed no dominant masses, no skin and no axillary adenopathy. No suspicious abnormality to suggest recurrent breast cancer.  -Letrozole 2.5 mg p.o. daily : Continue to decrease risk of recurrence of breast cancer  -Encouraged self chest wall exams  -Keep follow-up with Dr. Yoder on 2024  -Keep follow-up appointment with Dr. Castellanos on 2023  FOLLOW UP:  Follow-up appointment made for 5 months, or sooner, if needed, breast cancer  Continue to follow with medical providers as recommended  Labs at next visit: CBC and CMP        History obtained from  PATIENT and CHART    PAST MEDICAL HISTORY  Past Medical History:   Diagnosis Date    CHF (congestive heart failure)     COPD (chronic obstructive pulmonary disease)     Diabetes mellitus     Disease of thyroid gland     Diverticulosis     Family history of colonic polyps     GERD (gastroesophageal reflux disease)     History of colon polyps     Hyperlipidemia     Hypertension     Lobular carcinoma of breast 2022    Narcolepsy     Rheumatoid arthritis       PAST SURGICAL HISTORY  Past Surgical History:   Procedure Laterality Date    CARDIAC CATHETERIZATION        SECTION      CHOLECYSTECTOMY      COLONOSCOPY N/A 04/12/2018    Diverticulosis in the left colon; Two 4-5mm hyperplastic polyps in the rectum; Repeat 5 years    COLONOSCOPY N/A 4/27/2023    Procedure: COLONOSCOPY WITH ANESTHESIA;  Surgeon: Mary Segura MD;  Location: [facility] ENDOSCOPY;  Service: Gastroenterology;  Laterality: N/A;  pre: hx polyp  post: diverticulosis  Lloyd Lynn, DO    ENDOSCOPY N/A 4/27/2023    Procedure: ESOPHAGOGASTRODUODENOSCOPY WITH ANESTHESIA;  Surgeon: Mary Segura MD;  Location: [facility] ENDOSCOPY;  Service: Gastroenterology;  Laterality: N/A;  pre: dysphagia. gerd.  post: balloon dilation  Lloyd Lynn, DO    HYSTERECTOMY      MASTECTOMY Bilateral 04/08/2022    OOPHORECTOMY        FAMILY HISTORY  family history includes Arthritis in her father and mother; Breast cancer in her maternal aunt and maternal cousin; Colon polyps in her mother; Depression in her maternal grandmother and paternal grandfather; Heart failure in her father; Narcolepsy in her daughter; No Known Problems in her sister.     SOCIAL HISTORY  Social History     Tobacco Use    Smoking status: Former     Types: Cigarettes    Smokeless tobacco: Never    Tobacco comments:     Quit in 2004   Vaping Use    Vaping Use: Never used   Substance Use Topics    Alcohol use: No    Drug use: No      ALLERGIES  Contrast dye (echo or unknown ct/mr) and Sulfa antibiotics     MEDICATIONS  Current Outpatient Medications   Medication Sig Dispense Refill    albuterol (PROVENTIL HFA;VENTOLIN HFA) 108 (90 Base) MCG/ACT inhaler Inhale 2 puffs Every 4 (Four) Hours As Needed for Wheezing. 1 inhaler 5    aspirin 81 MG EC tablet Take 1 tablet by mouth Daily.      carvedilol (COREG) 25 MG tablet Take 1 tablet by mouth 2 (Two) Times a Day.      Entresto 24-26 MG tablet Take 1 tablet by mouth 2 (Two) Times a Day.      Fiber powder Take  by mouth. 1 tablespoon daily      folic acid (FOLVITE) 1 MG tablet Take 1 tablet by mouth Daily.       furosemide (LASIX) 20 MG tablet Take 1 tablet by mouth 2 (Two) Times a Day.      letrozole (FEMARA) 2.5 MG tablet Take 1 tablet by mouth Daily.      levothyroxine (SYNTHROID) 200 MCG tablet Take 1 tablet by mouth Daily. 60 tablet 0    levothyroxine (SYNTHROID, LEVOTHROID) 25 MCG tablet Take 1 tablet by mouth Daily.      Magnesium 250 MG tablet Take 1 tablet by mouth Daily.      meloxicam (MOBIC) 15 MG tablet Take 1 tablet by mouth Daily.      metFORMIN (GLUCOPHAGE) 500 MG tablet Take 1 tablet by mouth 2 (Two) Times a Day.      methotrexate 2.5 MG tablet Take 8 tablets by mouth 1 (One) Time Per Week.      multivitamin with minerals tablet tablet Take 1 tablet by mouth Daily.      omeprazole (priLOSEC) 40 MG capsule Take 1 capsule by mouth Daily. 30 capsule 11    spironolactone (ALDACTONE) 25 MG tablet Take 1 tablet by mouth Daily.      tiotropium bromide-olodaterol (Stiolto Respimat) 2.5-2.5 MCG/ACT aerosol solution inhaler INHALE 2 PUFFS BY MOUTH ONCE DAILY FOR 30 DAYS      Zinc 50 MG tablet Take 1 tablet by mouth Daily.       No current facility-administered medications for this visit.      The following portions of the patient's history were reviewed and updated as appropriate: allergies, current medications, past family history, past medical history, past social history, past surgical history and problem list.    REVIEW OF SYSTEMS  Review of Systems   Constitutional: Negative.  Negative for activity change, fatigue and unexpected weight change.   HENT: Negative.     Respiratory: Negative.  Negative for cough and shortness of breath.    Cardiovascular: Negative.  Negative for chest pain and palpitations.   Gastrointestinal: Negative.    Genitourinary: Negative.    Musculoskeletal: Negative.    Skin: Negative.    Neurological: Negative.  Negative for dizziness and weakness.   Hematological: Negative.  Negative for adenopathy.   Psychiatric/Behavioral: Negative.     All other systems reviewed and are negative.    "  I have reviewed and confirmed the accuracy of the ROS as documented by the MA/LPN/RN Tayo Castellanos III, MD    PHYSICAL EXAM  VITAL SIGNS:   Vitals:    11/02/23 1031   BP: 119/50   Pulse: 63   SpO2: 99%  Comment: room air   Weight: 97.9 kg (215 lb 14.4 oz)   Height: 162.6 cm (64\")   PainSc: 0-No pain        Physical Exam  Vitals reviewed.   Constitutional:       Appearance: Normal appearance.   HENT:      Head: Normocephalic.   Cardiovascular:      Rate and Rhythm: Normal rate and regular rhythm.      Pulses: Normal pulses.      Heart sounds: Normal heart sounds.   Pulmonary:      Effort: Pulmonary effort is normal. No respiratory distress.      Breath sounds: Normal breath sounds.   Chest:   Breasts:     Right: Absent.      Left: Absent.      Comments: Status post bilateral mastectomies; right chest wall s/p radiation therapy, well-healed; no palpable masses noted.   Abdominal:      General: Bowel sounds are normal.   Musculoskeletal:         General: Normal range of motion.      Cervical back: Normal range of motion and neck supple.   Lymphadenopathy:      Upper Body:      Right upper body: No supraclavicular, axillary or pectoral adenopathy.      Left upper body: No supraclavicular, axillary or pectoral adenopathy.   Skin:     General: Skin is warm.      Capillary Refill: Capillary refill takes less than 2 seconds.   Neurological:      General: No focal deficit present.      Mental Status: She is alert and oriented to person, place, and time.   Psychiatric:         Mood and Affect: Mood normal.         Behavior: Behavior normal.       Performance Status: ECOG (0) Fully active, able to carry on all predisease performance without restriction    Clinical Quality Measures  -Pain Documented by Standardized Tool, FPS Jordy Mir reports a pain score of 0. Given her pain assessment as noted, treatment options were discussed and the following options were decided upon as a follow-up plan to address the " patient's pain:  No pain, no plan given .   Pain Medications               aspirin 81 MG EC tablet Take 1 tablet by mouth Daily.    meloxicam (MOBIC) 15 MG tablet Take 1 tablet by mouth Daily.          Body Mass Index Screening and Follow-Up Plan  Class 2 Severe Obesity (BMI >=35 and <=39.9).     Tobacco Use: Screening and Cessation Intervention  Social History    Tobacco Use      Smoking status: Former        Types: Cigarettes      Smokeless tobacco: Never      Tobacco comments: Quit in 2004    Advanced Care Planning Advance Care Planning    ACP discussion was held with the patient during this visit. Patient does not have an advance directive, information provided.    ASSESSMENT AND PLAN  1. Lobular carcinoma of breast, unspecified laterality    2. S/P bilateral mastectomy    3. S/P lymph node biopsy    4. History of radiation therapy    5. Former smoker      Recommendations:  Jordy Mir is status post completion of adjuvant radiation therapy to the right chest wall and presents to our clinic today for follow up exam. Diagnosed with Stage IIA (T2, N1a, cM0, G1) ER/CA+ Infiltrating Lobular Carcinoma of right breast. Underwent bilateral mastectomy on 4/8/2022. She completed 5040 cGy in 28 fractions to the right chest wall on 08/30/2022.    We discussed expected post radiation long and short term effects, monthly self exams and NCCN surveillance recommendations. She is doing well and has no evidence of recurrent or metastatic disease today. Continue ongoing management with other physicians, will follow up with us in as needed.    Patient Instructions   1) follow up as needed    Time Spent: I spent 32 minutes caring for Jordy on this date of service. This time includes time spent by me in the following activities: preparing for the visit, reviewing tests, obtaining and/or reviewing a separately obtained history, performing a medically appropriate examination and/or evaluation, counseling and educating the  patient/family/caregiver, ordering medications, tests, or procedures, referring and communicating with other health care professionals, documenting information in the medical record and independently interpreting results and communicating that information with the patient/family/caregiver.   Tayo Castellanos III, MD  11/02/2023          normal...

## 2023-11-02 ENCOUNTER — OFFICE VISIT (OUTPATIENT)
Dept: RADIATION ONCOLOGY | Facility: HOSPITAL | Age: 58
End: 2023-11-02
Payer: COMMERCIAL

## 2023-11-02 VITALS
OXYGEN SATURATION: 99 % | HEIGHT: 64 IN | BODY MASS INDEX: 36.86 KG/M2 | WEIGHT: 215.9 LBS | DIASTOLIC BLOOD PRESSURE: 50 MMHG | SYSTOLIC BLOOD PRESSURE: 119 MMHG | HEART RATE: 63 BPM

## 2023-11-02 DIAGNOSIS — C50.919 LOBULAR CARCINOMA OF BREAST, UNSPECIFIED LATERALITY: Primary | ICD-10-CM

## 2023-11-02 DIAGNOSIS — Z98.890 S/P LYMPH NODE BIOPSY: ICD-10-CM

## 2023-11-02 DIAGNOSIS — Z92.3 HISTORY OF RADIATION THERAPY: ICD-10-CM

## 2023-11-02 DIAGNOSIS — Z90.13 S/P BILATERAL MASTECTOMY: ICD-10-CM

## 2023-11-02 DIAGNOSIS — Z87.891 FORMER SMOKER: ICD-10-CM

## 2023-11-02 PROCEDURE — G0463 HOSPITAL OUTPT CLINIC VISIT: HCPCS | Performed by: RADIOLOGY

## 2023-11-02 RX ORDER — ASPIRIN 81 MG/1
1 TABLET ORAL DAILY
COMMUNITY

## 2024-01-26 ENCOUNTER — OFFICE VISIT (OUTPATIENT)
Dept: SURGERY | Age: 59
End: 2024-01-26
Payer: COMMERCIAL

## 2024-01-26 VITALS — WEIGHT: 217 LBS | TEMPERATURE: 98.8 F | HEIGHT: 64 IN | BODY MASS INDEX: 37.05 KG/M2

## 2024-01-26 DIAGNOSIS — C50.919 INFILTRATING LOBULAR CARCINOMA OF BREAST IN FEMALE (HCC): Primary | Chronic | ICD-10-CM

## 2024-01-26 PROCEDURE — 99212 OFFICE O/P EST SF 10 MIN: CPT | Performed by: SURGERY

## 2024-01-26 RX ORDER — CHOLECALCIFEROL (VITAMIN D3) 125 MCG
500 CAPSULE ORAL DAILY
COMMUNITY

## 2024-01-26 RX ORDER — ROSUVASTATIN CALCIUM 5 MG/1
5 TABLET, COATED ORAL DAILY
COMMUNITY
Start: 2023-12-07

## 2024-01-26 ASSESSMENT — ENCOUNTER SYMPTOMS
CHEST TIGHTNESS: 0
COUGH: 0
EYE PAIN: 0
NAUSEA: 0
DIARRHEA: 0
CONSTIPATION: 0
SHORTNESS OF BREATH: 0
ABDOMINAL DISTENTION: 0
COLOR CHANGE: 0
ABDOMINAL PAIN: 0
VOMITING: 0
BACK PAIN: 0
SORE THROAT: 0
EYE REDNESS: 0

## 2024-01-26 NOTE — PROGRESS NOTES
reactive to light.   Cardiovascular:      Rate and Rhythm: Normal rate and regular rhythm.   Pulmonary:      Effort: Pulmonary effort is normal.      Breath sounds: Normal breath sounds. No wheezing or rales.   Chest:   Breasts:     Right: Absent.      Left: Absent.      Comments: B/l mastectomy scars well healed.  No overlying skin changes.     Right breast skin changes due to radiation therapy. No erythema.  Abdominal:      General: Bowel sounds are normal. There is no distension.      Palpations: Abdomen is soft.   Musculoskeletal:         General: Normal range of motion.      Cervical back: Normal range of motion and neck supple.   Lymphadenopathy:      Cervical: No cervical adenopathy.      Upper Body:      Right upper body: No supraclavicular or axillary adenopathy.      Left upper body: No supraclavicular or axillary adenopathy.   Skin:     General: Skin is warm and dry.   Neurological:      Mental Status: She is alert and oriented to person, place, and time.   Psychiatric:         Behavior: Behavior normal.         Thought Content: Thought content normal.         Judgment: Judgment normal.         7/27/23 Left Breast US   BI-RADS Category 2   The patient's information has been added to a  reminder system with a target due date for the next mammogram.  Signed by Dr Corwin Gillette    ASSESSMENT:   Diagnosis Orders   1. Infiltrating lobular carcinoma of breast in female (HCC)            PLAN:  No orders of the defined types were placed in this encounter.    No orders of the defined types were placed in this encounter.    Doing well. Will see back in 4 months for repeat physical exam. Expressed to patient she can concha me and see me sooner, should she have any concerns.     Continue letrozole. Continue oncology f/u.     Return in about 4 months (around 5/26/2024).    Mariola Stephens,  1/26/2024 10:57 AM

## 2024-02-02 DIAGNOSIS — Z17.0 CARCINOMA OF RIGHT BREAST, ESTROGEN AND PROGESTERONE RECEPTOR POSITIVE (HCC): ICD-10-CM

## 2024-02-02 DIAGNOSIS — C50.919 INFILTRATING LOBULAR CARCINOMA OF BREAST IN FEMALE (HCC): ICD-10-CM

## 2024-02-02 DIAGNOSIS — C50.911 CARCINOMA OF RIGHT BREAST, ESTROGEN AND PROGESTERONE RECEPTOR POSITIVE (HCC): ICD-10-CM

## 2024-02-02 RX ORDER — LETROZOLE 2.5 MG/1
TABLET, FILM COATED ORAL
Qty: 90 TABLET | Refills: 0 | Status: SHIPPED | OUTPATIENT
Start: 2024-02-02

## 2024-03-17 DIAGNOSIS — N95.1 HOT FLASHES DUE TO MENOPAUSE: ICD-10-CM

## 2024-03-18 RX ORDER — OXYBUTYNIN CHLORIDE 5 MG/1
TABLET ORAL
Qty: 180 TABLET | Refills: 0 | OUTPATIENT
Start: 2024-03-18

## 2024-04-09 ENCOUNTER — TELEPHONE (OUTPATIENT)
Dept: HEMATOLOGY | Age: 59
End: 2024-04-09

## 2024-04-11 ENCOUNTER — HOSPITAL ENCOUNTER (OUTPATIENT)
Dept: INFUSION THERAPY | Age: 59
Discharge: HOME | End: 2024-04-11
Payer: COMMERCIAL

## 2024-04-11 ENCOUNTER — OFFICE VISIT (OUTPATIENT)
Dept: HEMATOLOGY | Age: 59
End: 2024-04-11

## 2024-04-11 VITALS
TEMPERATURE: 97.9 F | OXYGEN SATURATION: 97 % | SYSTOLIC BLOOD PRESSURE: 130 MMHG | BODY MASS INDEX: 38.58 KG/M2 | DIASTOLIC BLOOD PRESSURE: 80 MMHG | WEIGHT: 226 LBS | HEART RATE: 67 BPM | HEIGHT: 64 IN

## 2024-04-11 DIAGNOSIS — C50.919 INFILTRATING LOBULAR CARCINOMA OF BREAST IN FEMALE (HCC): ICD-10-CM

## 2024-04-11 DIAGNOSIS — Z79.811 ENCOUNTER FOR MONITORING AROMATASE INHIBITOR THERAPY: ICD-10-CM

## 2024-04-11 DIAGNOSIS — C50.911 CARCINOMA OF RIGHT BREAST, ESTROGEN AND PROGESTERONE RECEPTOR POSITIVE (HCC): ICD-10-CM

## 2024-04-11 DIAGNOSIS — Z15.89 CHEK2-RELATED BREAST CANCER (HCC): ICD-10-CM

## 2024-04-11 DIAGNOSIS — Z15.02 CHEK2-RELATED BREAST CANCER (HCC): ICD-10-CM

## 2024-04-11 DIAGNOSIS — C50.919 INFILTRATING LOBULAR CARCINOMA OF BREAST IN FEMALE (HCC): Primary | ICD-10-CM

## 2024-04-11 DIAGNOSIS — C50.919 CHEK2-RELATED BREAST CANCER (HCC): ICD-10-CM

## 2024-04-11 DIAGNOSIS — Z17.0 CARCINOMA OF RIGHT BREAST, ESTROGEN AND PROGESTERONE RECEPTOR POSITIVE (HCC): ICD-10-CM

## 2024-04-11 DIAGNOSIS — Z78.0 POST-MENOPAUSAL: ICD-10-CM

## 2024-04-11 DIAGNOSIS — C50.911 CARCINOMA OF RIGHT BREAST, ESTROGEN AND PROGESTERONE RECEPTOR POSITIVE (HCC): Primary | ICD-10-CM

## 2024-04-11 DIAGNOSIS — Z51.81 ENCOUNTER FOR MONITORING AROMATASE INHIBITOR THERAPY: ICD-10-CM

## 2024-04-11 DIAGNOSIS — Z17.0 CARCINOMA OF RIGHT BREAST, ESTROGEN AND PROGESTERONE RECEPTOR POSITIVE (HCC): Primary | ICD-10-CM

## 2024-04-11 DIAGNOSIS — Z15.09 CHEK2-RELATED BREAST CANCER (HCC): ICD-10-CM

## 2024-04-11 DIAGNOSIS — Z79.811 AROMATASE INHIBITOR USE: ICD-10-CM

## 2024-04-11 LAB
ALBUMIN SERPL-MCNC: 4.5 G/DL (ref 3.5–5.2)
ALP SERPL-CCNC: 75 U/L (ref 35–104)
ALT SERPL-CCNC: 34 U/L (ref 9–52)
ANION GAP SERPL CALCULATED.3IONS-SCNC: 10 MMOL/L (ref 7–19)
AST SERPL-CCNC: 32 U/L (ref 14–36)
BASOPHILS # BLD: 0.03 K/UL (ref 0.01–0.08)
BASOPHILS NFR BLD: 0.4 % (ref 0.1–1.2)
BILIRUB SERPL-MCNC: 0.5 MG/DL (ref 0.2–1.3)
BUN SERPL-MCNC: 16 MG/DL (ref 7–17)
CALCIUM SERPL-MCNC: 9.6 MG/DL (ref 8.4–10.2)
CHLORIDE SERPL-SCNC: 102 MMOL/L (ref 98–111)
CO2 SERPL-SCNC: 30 MMOL/L (ref 22–29)
CREAT SERPL-MCNC: 0.9 MG/DL (ref 0.5–1)
EOSINOPHIL # BLD: 0.28 K/UL (ref 0.04–0.54)
EOSINOPHIL NFR BLD: 4 % (ref 0.7–7)
ERYTHROCYTE [DISTWIDTH] IN BLOOD BY AUTOMATED COUNT: 14 % (ref 11.7–14.4)
GLOBULIN: 3.2 G/DL
GLUCOSE SERPL-MCNC: 128 MG/DL (ref 74–106)
HCT VFR BLD AUTO: 35.2 % (ref 34.1–44.9)
HGB BLD-MCNC: 11.5 G/DL (ref 11.2–15.7)
LYMPHOCYTES # BLD: 1.72 K/UL (ref 1.18–3.74)
LYMPHOCYTES NFR BLD: 24.8 % (ref 19.3–53.1)
MCH RBC QN AUTO: 31.6 PG (ref 25.6–32.2)
MCHC RBC AUTO-ENTMCNC: 32.7 G/DL (ref 32.3–35.5)
MCV RBC AUTO: 96.7 FL (ref 79.4–94.8)
MONOCYTES # BLD: 0.47 K/UL (ref 0.24–0.82)
MONOCYTES NFR BLD: 6.8 % (ref 4.7–12.5)
NEUTROPHILS # BLD: 4.38 K/UL (ref 1.56–6.13)
NEUTS SEG NFR BLD: 63.1 % (ref 34–71.1)
PLATELET # BLD AUTO: 173 K/UL (ref 182–369)
PMV BLD AUTO: 10.6 FL (ref 7.4–10.4)
POTASSIUM SERPL-SCNC: 4.2 MMOL/L (ref 3.5–5.1)
PROT SERPL-MCNC: 7.7 G/DL (ref 6.3–8.2)
RBC # BLD AUTO: 3.64 M/UL (ref 3.93–5.22)
SODIUM SERPL-SCNC: 142 MMOL/L (ref 137–145)
WBC # BLD AUTO: 6.94 K/UL (ref 3.98–10.04)

## 2024-04-11 PROCEDURE — 80053 COMPREHEN METABOLIC PANEL: CPT

## 2024-04-11 PROCEDURE — 36415 COLL VENOUS BLD VENIPUNCTURE: CPT

## 2024-04-11 PROCEDURE — 85025 COMPLETE CBC W/AUTO DIFF WBC: CPT

## 2024-04-11 PROCEDURE — 99212 OFFICE O/P EST SF 10 MIN: CPT

## 2024-04-11 NOTE — PROGRESS NOTES
2020.  12/9/21 Bilateral mammogram ([facility]): The breast tissue is heterogeneously dense. This may lower the sensitivity of mammography. There is heterogeneous increased density with distortion suggested in the upper outer right breast 9-10 o'clock. On the right MLO slab 8/18 there is a possible 7mm nodule toward 9-10 o'clock deep in the breast. No new abnormal density is noted in the left breast. There are no suspicious appearing clustered microcalcifications or skin thickening.  12/9/21 US breast ([facility] [place]): No lesion noted in the left breast. In the right breast there is a heterogeneous process suggesting a mass at 10 o'clock. This measures 2.5 x 2.2 x 3.3cm. There is shadowing from this process. There is blood flow suggested in the process. The possible small nodule noted in the right breast on the MLO projection, on the mammogram is not identified on ultrasound.   2/17/22 Breast, right breast needle core biopsies at 10 o'clock position:  Infiltrating lobular carcinoma, favor grade 2. Invasive carcinoma measures 0.9 cm in greatest linear dimension and is present in multiple cores. ER 65%, AL 65%, HER-2 negative, Ki67 8%.  3/3/22 MRI BREAST BILATERAL W WO CONTRAST: In the right breast around 10:00 in the mid depth region, there is residual enhancing mass. There is a marker clip in this area related to prior biopsy. The area of enhancement measures 4.3 x 1.7 x 2.8 cm. There is medium to rapid initial enhancement. The delayed enhancement demonstrates a mixture of type I and type II kinetics. There is a separate nodule in the right breast located close to the biopsied lesion. This measures about 1 x 0.7 x 1.2 cm and is located at 12-1:00 in the mid depth region. There is similar enhancement of this nodule with type I--type 2 kinetics. LEFT BREAST: There is a small nodule measuring 6 mm located anteriorly and fairly close to the nipple at the 9:00 location demonstrating persistent type I kinetics. The nodule is smoothly

## 2024-04-13 ASSESSMENT — ENCOUNTER SYMPTOMS
EYE DISCHARGE: 0
SHORTNESS OF BREATH: 0
SORE THROAT: 0
BLOOD IN STOOL: 0
COUGH: 0
RESPIRATORY NEGATIVE: 1
DIARRHEA: 0
EYE REDNESS: 0
EYE PAIN: 0
NAUSEA: 0
ABDOMINAL PAIN: 0
WHEEZING: 0
CONSTIPATION: 0
VOMITING: 0
GASTROINTESTINAL NEGATIVE: 1
BACK PAIN: 0
EYES NEGATIVE: 1

## 2024-05-01 DIAGNOSIS — Z17.0 CARCINOMA OF RIGHT BREAST, ESTROGEN AND PROGESTERONE RECEPTOR POSITIVE (HCC): ICD-10-CM

## 2024-05-01 DIAGNOSIS — C50.911 CARCINOMA OF RIGHT BREAST, ESTROGEN AND PROGESTERONE RECEPTOR POSITIVE (HCC): ICD-10-CM

## 2024-05-01 DIAGNOSIS — K21.9 GASTROESOPHAGEAL REFLUX DISEASE WITHOUT ESOPHAGITIS: Primary | ICD-10-CM

## 2024-05-01 DIAGNOSIS — C50.919 INFILTRATING LOBULAR CARCINOMA OF BREAST IN FEMALE (HCC): ICD-10-CM

## 2024-05-01 RX ORDER — OMEPRAZOLE 40 MG/1
40 CAPSULE, DELAYED RELEASE ORAL DAILY
Qty: 90 CAPSULE | Refills: 0 | Status: SHIPPED | OUTPATIENT
Start: 2024-05-01

## 2024-05-01 RX ORDER — LETROZOLE 2.5 MG/1
TABLET, FILM COATED ORAL
Qty: 90 TABLET | Refills: 1 | Status: SHIPPED | OUTPATIENT
Start: 2024-05-01

## 2024-05-01 NOTE — TELEPHONE ENCOUNTER
Rx Refill Note  Requested Prescriptions     Pending Prescriptions Disp Refills    omeprazole (priLOSEC) 40 MG capsule [Pharmacy Med Name: Omeprazole 40 MG Oral Capsule Delayed Release] 90 capsule 0     Sig: Take 1 capsule by mouth once daily      Last office visit with prescribing clinician: 4/27/2023 for endo/colon     Last telemedicine visit with prescribing clinician: Visit date not found     Next office visit with prescribing clinician: Visit date not found     Pt due for yearly OV-pended 1 90-day refill to Dr. Segura to last until pt can schedule appt. I also placed note to pharmacy that pt needs to schedule appt for more refills after this one.                           Would you like a call back once the refill request has been completed: [] Yes [] No    If the office needs to give you a call back, can they leave a voicemail: [] Yes [] No    Abbie Brown MA  05/01/24, 10:24 CDT

## 2024-05-03 ENCOUNTER — TELEPHONE (OUTPATIENT)
Dept: GASTROENTEROLOGY | Facility: CLINIC | Age: 59
End: 2024-05-03
Payer: COMMERCIAL

## 2024-05-03 NOTE — TELEPHONE ENCOUNTER
We had sent in refills for pt's Omeprazole and I rec'd a fax back from [facility] stating it needed a PA. I got on CoverMyMeds just now and initiated authorization. I called and updated pt about it-she knows I will call her back once I hear back from insurance.

## 2024-06-20 ENCOUNTER — HOSPITAL ENCOUNTER (OUTPATIENT)
Dept: WOMENS IMAGING | Age: 59
Discharge: HOME | End: 2024-06-20
Payer: COMMERCIAL

## 2024-06-20 DIAGNOSIS — Z79.811 AROMATASE INHIBITOR USE: ICD-10-CM

## 2024-06-20 DIAGNOSIS — Z78.0 POST-MENOPAUSAL: ICD-10-CM

## 2024-06-20 PROCEDURE — 77080 DXA BONE DENSITY AXIAL: CPT

## 2024-07-29 DIAGNOSIS — K21.9 GASTROESOPHAGEAL REFLUX DISEASE WITHOUT ESOPHAGITIS: ICD-10-CM

## 2024-07-29 RX ORDER — OMEPRAZOLE 40 MG/1
CAPSULE, DELAYED RELEASE ORAL
Qty: 90 CAPSULE | Refills: 0 | OUTPATIENT
Start: 2024-07-29

## 2024-07-31 DIAGNOSIS — K21.9 GASTROESOPHAGEAL REFLUX DISEASE WITHOUT ESOPHAGITIS: ICD-10-CM

## 2024-08-01 RX ORDER — OMEPRAZOLE 40 MG/1
CAPSULE, DELAYED RELEASE ORAL
Qty: 90 CAPSULE | Refills: 0 | OUTPATIENT
Start: 2024-08-01

## 2024-08-15 DIAGNOSIS — K21.9 GASTROESOPHAGEAL REFLUX DISEASE WITHOUT ESOPHAGITIS: ICD-10-CM

## 2024-08-15 RX ORDER — OMEPRAZOLE 40 MG/1
CAPSULE, DELAYED RELEASE ORAL
Qty: 90 CAPSULE | Refills: 0 | OUTPATIENT
Start: 2024-08-15

## 2024-08-19 DIAGNOSIS — K21.9 GASTROESOPHAGEAL REFLUX DISEASE WITHOUT ESOPHAGITIS: ICD-10-CM

## 2024-08-19 RX ORDER — OMEPRAZOLE 40 MG/1
40 CAPSULE, DELAYED RELEASE ORAL DAILY
Qty: 90 CAPSULE | Refills: 0 | Status: SHIPPED | OUTPATIENT
Start: 2024-08-19

## 2024-08-19 NOTE — TELEPHONE ENCOUNTER
Rx Refill Note  Requested Prescriptions      No prescriptions requested or ordered in this encounter      Last office visit with prescribing clinician: 4/27/2023 for endo/colon     Last telemedicine visit with prescribing clinician: Visit date not found     Next office visit with prescribing clinician: 10/3/2024 with Denise.    Pt called me a few min ago stating she was out of her Omeprazole and the pharmacy told her she had to contact our office to get it filled. I advised her that she was overdue for yearly OV. She is agreeable to scheduling so I transferred her to [facility] to arrange OV. I advised pt I would pend 1 90-day refill to Dr. Segura to last until she can be seen in office.                           Would you like a call back once the refill request has been completed: [] Yes [] No    If the office needs to give you a call back, can they leave a voicemail: [] Yes [] No    Abbie Brown MA  08/19/24, 13:51 CDT

## 2024-10-03 ENCOUNTER — OFFICE VISIT (OUTPATIENT)
Dept: GASTROENTEROLOGY | Facility: CLINIC | Age: 59
End: 2024-10-03
Payer: COMMERCIAL

## 2024-10-03 VITALS
SYSTOLIC BLOOD PRESSURE: 142 MMHG | WEIGHT: 225 LBS | HEIGHT: 64 IN | BODY MASS INDEX: 38.41 KG/M2 | TEMPERATURE: 97.7 F | DIASTOLIC BLOOD PRESSURE: 84 MMHG | OXYGEN SATURATION: 97 % | HEART RATE: 57 BPM

## 2024-10-03 DIAGNOSIS — K21.9 GASTROESOPHAGEAL REFLUX DISEASE WITHOUT ESOPHAGITIS: ICD-10-CM

## 2024-10-03 PROCEDURE — 99213 OFFICE O/P EST LOW 20 MIN: CPT | Performed by: NURSE PRACTITIONER

## 2024-10-03 RX ORDER — CYANOCOBALAMIN (VITAMIN B-12) 1000 MCG
1 TABLET, SUBLINGUAL SUBLINGUAL DAILY
COMMUNITY

## 2024-10-03 RX ORDER — UPADACITINIB 15 MG/1
1 TABLET, EXTENDED RELEASE ORAL DAILY
COMMUNITY

## 2024-10-03 RX ORDER — OMEPRAZOLE 40 MG/1
40 CAPSULE, DELAYED RELEASE ORAL DAILY
Qty: 90 CAPSULE | Refills: 3 | Status: SHIPPED | OUTPATIENT
Start: 2024-10-03

## 2024-10-03 RX ORDER — ROSUVASTATIN CALCIUM 5 MG/1
1 TABLET, COATED ORAL DAILY
COMMUNITY

## 2024-10-03 NOTE — PROGRESS NOTES
Primary Physician: Lloyd Lynn,     Chief Complaint   Patient presents with    Med Refill     Pt presents today for yearly OV for GERD-Needs refills on Omeprazole; Pt states overall she feels good and feels like the medication works well for her        Subjective     Jordy Mir is a 59 y.o. female.    HPI  GERD  2023 endoscopy: Esophagus, stomach, and duodenum normal-appearing.  No esophageal abnormality to explain dysphagia.  Esophagus was dilated up to 18 mm.  Small bowel biopsies with no significant pathology.    Pt has no real dysphagia    Patient maintained on omeprazole 40 mg once daily.  2024 creatinine normal 0.9    Family Hx Colon Polyps  Mother has had colon polyps       Personal Hx Colon Polyps  Patient up-to-date on colonoscopy.  2023 colonoscopy: Diverticulosis of the left colon otherwise normal mucosa throughout.  No polyps seen.  Negative random colon biopsies.  5-year recall given.    NO family hx colon cancer  She reports    Past Medical History:   Diagnosis Date    CHF (congestive heart failure)     COPD (chronic obstructive pulmonary disease)     Diabetes mellitus     Disease of thyroid gland     Diverticulosis     Family history of colonic polyps     GERD (gastroesophageal reflux disease)     History of colon polyps     Hyperlipidemia     Hypertension     Lobular carcinoma of breast 2022    Narcolepsy     Rheumatoid arthritis        Past Surgical History:   Procedure Laterality Date    CARDIAC CATHETERIZATION       SECTION      CHOLECYSTECTOMY      COLONOSCOPY N/A 2018    Diverticulosis in the left colon; Two 4-5mm hyperplastic polyps in the rectum; Repeat 5 years    COLONOSCOPY N/A 2023    Diverticulosis in the left colon; Normal mucosa in the entire examined colon-biopsied; The examination was otherwise normal on direct and retroflexion views; Repeat 5 years    ENDOSCOPY N/A 2023    No endoscopic esophageal abnormality to explain  patient's dysphagia-Esophagus dilated; Normal stomach; Normal examined duodenum-biopsied    HYSTERECTOMY      MASTECTOMY Bilateral 04/08/2022    OOPHORECTOMY          Current Outpatient Medications:     albuterol (PROVENTIL HFA;VENTOLIN HFA) 108 (90 Base) MCG/ACT inhaler, Inhale 2 puffs Every 4 (Four) Hours As Needed for Wheezing., Disp: 1 inhaler, Rfl: 5    aspirin 81 MG EC tablet, Take 1 tablet by mouth Daily., Disp: , Rfl:     carvedilol (COREG) 25 MG tablet, Take 1 tablet by mouth 2 (Two) Times a Day., Disp: , Rfl:     Corn Dextrin (CLEAR FIBER POWDER PO), Take  by mouth Daily., Disp: , Rfl:     Cyanocobalamin (Vitamin B-12) 500 MCG sublingual tablet, Place 1 tablet under the tongue Daily., Disp: , Rfl:     Entresto 24-26 MG tablet, Take 1 tablet by mouth 2 (Two) Times a Day., Disp: , Rfl:     folic acid (FOLVITE) 1 MG tablet, Take 1 tablet by mouth Daily., Disp: , Rfl:     letrozole (FEMARA) 2.5 MG tablet, Take 1 tablet by mouth Daily., Disp: , Rfl:     levothyroxine (SYNTHROID) 200 MCG tablet, Take 1 tablet by mouth Daily. (Patient taking differently: Take 1 tablet by mouth Daily. Takes with 25mcg for total of 225mcg/daily), Disp: 60 tablet, Rfl: 0    levothyroxine (SYNTHROID, LEVOTHROID) 25 MCG tablet, Take 1 tablet by mouth Daily. Takes with 200mcg for total of 225mcg/daily, Disp: , Rfl:     Magnesium 250 MG tablet, Take 1 tablet by mouth Daily., Disp: , Rfl:     meloxicam (MOBIC) 15 MG tablet, Take 1 tablet by mouth Daily., Disp: , Rfl:     methotrexate 2.5 MG tablet, Take 2 tablets by mouth 1 (One) Time Per Week., Disp: , Rfl:     multivitamin with minerals tablet tablet, Take 1 tablet by mouth Daily., Disp: , Rfl:     omeprazole (priLOSEC) 40 MG capsule, Take 1 capsule by mouth Daily., Disp: 90 capsule, Rfl: 3    Rinvoq 15 MG tablet sustained-release 24 hour, Take 1 tablet by mouth Daily., Disp: , Rfl:     rosuvastatin (CRESTOR) 5 MG tablet, Take 1 tablet by mouth Daily., Disp: , Rfl:     Zinc 50 MG  "tablet, Take 1 tablet by mouth Daily., Disp: , Rfl:     Allergies   Allergen Reactions    Contrast Dye (Echo Or Unknown Ct/Mr) Anaphylaxis    Atorvastatin Myalgia    Sulfa Antibiotics Rash       Social History     Socioeconomic History    Marital status:    Tobacco Use    Smoking status: Former     Types: Cigarettes    Smokeless tobacco: Never    Tobacco comments:     Quit in 2004   Vaping Use    Vaping status: Never Used   Substance and Sexual Activity    Alcohol use: No    Drug use: No    Sexual activity: Defer       Family History   Problem Relation Age of Onset    Arthritis Mother     Colon polyps Mother         < 60 years of age    Arthritis Father     Heart failure Father     No Known Problems Sister     Breast cancer Maternal Aunt     Pancreatic cancer Maternal Aunt     Depression Maternal Grandmother     Depression Paternal Grandfather     Narcolepsy Daughter     Breast cancer Maternal Cousin     Colon cancer Neg Hx     Esophageal cancer Neg Hx     Liver cancer Neg Hx     Rectal cancer Neg Hx     Stomach cancer Neg Hx     Liver disease Neg Hx        Review of Systems   Constitutional:  Negative for unexpected weight change.   HENT:  Negative for trouble swallowing.    Gastrointestinal:  Negative for blood in stool, constipation and diarrhea.       Objective     /84 (BP Location: Left arm, Patient Position: Sitting, Cuff Size: Adult)   Pulse 57   Temp 97.7 °F (36.5 °C) (Infrared)   Ht 162.6 cm (64\")   Wt 102 kg (225 lb)   SpO2 97%   Breastfeeding No   BMI 38.62 kg/m²     Physical Exam  Vitals reviewed.   Constitutional:       Appearance: Normal appearance.   Neurological:      Mental Status: She is alert.             Lab Results - Last 18 Months   Lab Units 04/11/24  1048 10/12/23  1111 05/04/23  1014   HEMOGLOBIN g/dL 11.5 12.4 12.6   HEMATOCRIT % 35.2 35.4 40.8   MCV fL 96.7* 94.1 106.8*   WBC K/uL 6.94 13.14* 8.89   RDW % 14.0 14.4 13.7   MPV fL 10.6* 11.7* 12.1*   PLATELETS K/uL 173* " 196 162*             IMPRESSION/PLAN:    Assessment & Plan      Problem List Items Addressed This Visit       Gastroesophageal reflux disease without esophagitis    Overview     Endoscopy 4/2023 does not show esophagitis or Kurtz's.  Empiric dilation to 18 mm done.  Small bowel biopsies obtained due to complaints of diarrhea.  4/11/2024 creatinine normal 0.9         Relevant Medications    omeprazole (priLOSEC) 40 MG capsule       Refilled Omeprazole today  Follow up prn              Trista Eason, APRN  10/03/24  09:43 CDT    Part of this note may be an electronic transcription/translation of spoken language to printed text.

## 2024-10-14 DIAGNOSIS — C50.911 CARCINOMA OF RIGHT BREAST, ESTROGEN AND PROGESTERONE RECEPTOR POSITIVE (HCC): Primary | ICD-10-CM

## 2024-10-14 DIAGNOSIS — Z17.0 CARCINOMA OF RIGHT BREAST, ESTROGEN AND PROGESTERONE RECEPTOR POSITIVE (HCC): Primary | ICD-10-CM

## 2024-10-14 DIAGNOSIS — Z17.21 CARCINOMA OF RIGHT BREAST, ESTROGEN AND PROGESTERONE RECEPTOR POSITIVE (HCC): Primary | ICD-10-CM

## 2024-10-14 NOTE — PROGRESS NOTES
10/17/2024    CREATININE 0.7 10/17/2024    GLUCOSE 137 (H) 10/17/2024    CALCIUM 9.4 10/17/2024    BILITOT 0.6 10/17/2024    ALKPHOS 88 10/17/2024    AST 24 10/17/2024    ALT 37 (H) 10/17/2024    LABGLOM >90 10/17/2024    GFRAA >59 04/01/2022    GLOB 3.2 04/11/2024       ASSESSMENT/PLAN:      1. Infiltrating lobular carcinoma of right breast, ER 65%, CT 65%, HER-2 negative, Ki67 8%, [id number]. Status post bilateral mastectomy, adjuvant radiation therapy and current recommendation is for adjuvant endocrine therapy with letrozole 2.5 mg for anticipated 5 years through March 2027.    She has had no known evidence to suggest recurrent disease.      Reports compliant with adjuvant endocrine therapy.  No new complaints.     Bilateral chest wall examination today revealed no dominant masses, no skin and no axillary adenopathy.  No suspicious abnormality to suggest recurrent breast cancer.    -Letrozole 2.5 mg p.o. daily anticipate 5 years dosing through March 2027: Continue to decrease risk of recurrence of breast cancer  At 4.5-year deepika (December 2026) will consider obtaining breast cancer index study to see if any benefit would be from an additional 5 years of adjuvant endocrine therapy.  -Encouraged self chest wall exams  -Follow-up with Dr. Stephens as recommended  -CBC and CMP today    2. Hot flashes related to aromatase inhibitor therapy  Hot flashes : Denied  Chronic arthralgias, has diagnosis of RA: Stable with no change      3. At risk for bone density loss-06/20/2024 Bone density- normal; lumbar spine T score -0.7, left femoral neck T score -0.7, left hip T score -0.4  Encouraged calcium 1200 mg with vitamin D 1000 units daily    -Check vitamin D level    4. CHEK2-related breast cancer (HCC)  Recommendations to check colonoscopy every 5 years if no premalignant polyps.   04/27/2023 Colonoscopy ([facility])- diverticulosis; 5 year recall  04/27/2023 Endoscopy ([facility])- normal     Follow-up with Dr. Arlene Bond as recommended,

## 2024-10-15 ENCOUNTER — TELEPHONE (OUTPATIENT)
Dept: HEMATOLOGY | Age: 59
End: 2024-10-15

## 2024-10-15 NOTE — TELEPHONE ENCOUNTER
. Called pt. to remind them of appointment on 10/17/2024 and had to leave a detailed voicemail with appointment date and time. Reminded patient to just come at appointment time, and to not come at the lab appointment time. Reminded patient that we will not check them in any more than 30 minutes before appointment time. We have now moved to the [facility] [place] that is located between our old office and the ER at the [facility]. Letting the Pt know that our front entrance faces the  [email or web address] fields, and also leaving our address.

## 2024-10-17 ENCOUNTER — HOSPITAL ENCOUNTER (OUTPATIENT)
Dept: INFUSION THERAPY | Age: 59
Discharge: HOME | End: 2024-10-17
Payer: COMMERCIAL

## 2024-10-17 ENCOUNTER — OFFICE VISIT (OUTPATIENT)
Dept: HEMATOLOGY | Age: 59
End: 2024-10-17
Payer: COMMERCIAL

## 2024-10-17 VITALS
TEMPERATURE: 98 F | HEART RATE: 64 BPM | SYSTOLIC BLOOD PRESSURE: 142 MMHG | DIASTOLIC BLOOD PRESSURE: 80 MMHG | WEIGHT: 225.8 LBS | BODY MASS INDEX: 38.55 KG/M2 | HEIGHT: 64 IN | OXYGEN SATURATION: 97 %

## 2024-10-17 DIAGNOSIS — C50.919 CHEK2-RELATED BREAST CANCER (HCC): ICD-10-CM

## 2024-10-17 DIAGNOSIS — Z79.811 ENCOUNTER FOR MONITORING AROMATASE INHIBITOR THERAPY: ICD-10-CM

## 2024-10-17 DIAGNOSIS — C50.911 CARCINOMA OF RIGHT BREAST, ESTROGEN AND PROGESTERONE RECEPTOR POSITIVE (HCC): ICD-10-CM

## 2024-10-17 DIAGNOSIS — Z15.09 CHEK2-RELATED BREAST CANCER (HCC): ICD-10-CM

## 2024-10-17 DIAGNOSIS — Z17.21 CARCINOMA OF RIGHT BREAST, ESTROGEN AND PROGESTERONE RECEPTOR POSITIVE (HCC): ICD-10-CM

## 2024-10-17 DIAGNOSIS — C50.919 INFILTRATING LOBULAR CARCINOMA OF BREAST IN FEMALE (HCC): Primary | ICD-10-CM

## 2024-10-17 DIAGNOSIS — Z15.89 CHEK2-RELATED BREAST CANCER (HCC): ICD-10-CM

## 2024-10-17 DIAGNOSIS — Z91.89 AT RISK FOR BONE DENSITY LOSS: ICD-10-CM

## 2024-10-17 DIAGNOSIS — Z51.81 ENCOUNTER FOR MONITORING AROMATASE INHIBITOR THERAPY: ICD-10-CM

## 2024-10-17 DIAGNOSIS — E55.9 VITAMIN D DEFICIENCY: ICD-10-CM

## 2024-10-17 DIAGNOSIS — Z17.0 CARCINOMA OF RIGHT BREAST, ESTROGEN AND PROGESTERONE RECEPTOR POSITIVE (HCC): ICD-10-CM

## 2024-10-17 DIAGNOSIS — Z15.02 CHEK2-RELATED BREAST CANCER (HCC): ICD-10-CM

## 2024-10-17 LAB
ALBUMIN SERPL-MCNC: 4.3 G/DL (ref 3.5–5.2)
ALP SERPL-CCNC: 88 U/L (ref 35–104)
ALT SERPL-CCNC: 37 U/L (ref 5–33)
ANION GAP SERPL CALCULATED.3IONS-SCNC: 9 MMOL/L (ref 7–19)
AST SERPL-CCNC: 24 U/L (ref 5–32)
BASOPHILS # BLD: 0.03 K/UL (ref 0.01–0.08)
BASOPHILS NFR BLD: 0.4 % (ref 0.1–1.2)
BILIRUB SERPL-MCNC: 0.6 MG/DL (ref 0–1.2)
BUN SERPL-MCNC: 13 MG/DL (ref 6–20)
CALCIUM SERPL-MCNC: 9.4 MG/DL (ref 8.6–10)
CHLORIDE SERPL-SCNC: 102 MMOL/L (ref 98–107)
CO2 SERPL-SCNC: 30 MMOL/L (ref 22–29)
CREAT SERPL-MCNC: 0.7 MG/DL (ref 0.5–0.9)
EOSINOPHIL # BLD: 0.17 K/UL (ref 0.04–0.54)
EOSINOPHIL NFR BLD: 2.3 % (ref 0.7–7)
ERYTHROCYTE [DISTWIDTH] IN BLOOD BY AUTOMATED COUNT: 13.9 % (ref 11.7–14.4)
GLUCOSE SERPL-MCNC: 137 MG/DL (ref 70–99)
HCT VFR BLD AUTO: 32.4 % (ref 34.1–44.9)
HGB BLD-MCNC: 11.4 G/DL (ref 11.2–15.7)
LYMPHOCYTES # BLD: 1.7 K/UL (ref 1.18–3.74)
LYMPHOCYTES NFR BLD: 23.3 % (ref 19.3–53.1)
MCH RBC QN AUTO: 32.8 PG (ref 25.6–32.2)
MCHC RBC AUTO-ENTMCNC: 35.2 G/DL (ref 32.3–35.5)
MCV RBC AUTO: 93.1 FL (ref 79.4–94.8)
MONOCYTES # BLD: 0.57 K/UL (ref 0.24–0.82)
MONOCYTES NFR BLD: 7.8 % (ref 4.7–12.5)
NEUTROPHILS # BLD: 4.81 K/UL (ref 1.56–6.13)
NEUTS SEG NFR BLD: 65.8 % (ref 34–71.1)
PLATELET # BLD AUTO: 181 K/UL (ref 182–369)
PMV BLD AUTO: 11.3 FL (ref 7.4–10.4)
POTASSIUM SERPL-SCNC: 3.8 MMOL/L (ref 3.5–5.1)
PROT SERPL-MCNC: 7.3 G/DL (ref 6.4–8.3)
RBC # BLD AUTO: 3.48 M/UL (ref 3.93–5.22)
SODIUM SERPL-SCNC: 141 MMOL/L (ref 136–145)
WBC # BLD AUTO: 7.31 K/UL (ref 3.98–10.04)

## 2024-10-17 PROCEDURE — 99214 OFFICE O/P EST MOD 30 MIN: CPT | Performed by: NURSE PRACTITIONER

## 2024-10-17 PROCEDURE — 85025 COMPLETE CBC W/AUTO DIFF WBC: CPT

## 2024-10-17 PROCEDURE — 36415 COLL VENOUS BLD VENIPUNCTURE: CPT

## 2024-10-17 PROCEDURE — 99212 OFFICE O/P EST SF 10 MIN: CPT

## 2024-10-17 PROCEDURE — 80053 COMPREHEN METABOLIC PANEL: CPT

## 2024-10-17 RX ORDER — DEXTRIN 3 G/3.8 G
1 POWDER (GRAM) ORAL DAILY
COMMUNITY

## 2024-10-17 RX ORDER — UPADACITINIB 15 MG/1
15 TABLET, EXTENDED RELEASE ORAL DAILY
COMMUNITY

## 2024-10-17 RX ORDER — MELOXICAM 15 MG/1
15 TABLET ORAL DAILY
COMMUNITY

## 2024-10-24 DIAGNOSIS — Z17.0 CARCINOMA OF RIGHT BREAST, ESTROGEN AND PROGESTERONE RECEPTOR POSITIVE (HCC): ICD-10-CM

## 2024-10-24 DIAGNOSIS — Z17.21 CARCINOMA OF RIGHT BREAST, ESTROGEN AND PROGESTERONE RECEPTOR POSITIVE (HCC): ICD-10-CM

## 2024-10-24 DIAGNOSIS — C50.911 CARCINOMA OF RIGHT BREAST, ESTROGEN AND PROGESTERONE RECEPTOR POSITIVE (HCC): ICD-10-CM

## 2024-10-24 DIAGNOSIS — C50.919 INFILTRATING LOBULAR CARCINOMA OF BREAST IN FEMALE (HCC): ICD-10-CM

## 2024-10-24 RX ORDER — LETROZOLE 2.5 MG/1
TABLET, FILM COATED ORAL
Qty: 90 TABLET | Refills: 0 | Status: SHIPPED | OUTPATIENT
Start: 2024-10-24

## 2024-11-19 ENCOUNTER — TELEPHONE (OUTPATIENT)
Dept: GASTROENTEROLOGY | Facility: CLINIC | Age: 59
End: 2024-11-19
Payer: COMMERCIAL

## 2024-11-19 NOTE — TELEPHONE ENCOUNTER
I rec'd a fax from [facility] yesterday stating pt's Omeprazole needs a PA. I got on CoverMyMeds just now and initiated authorization. I called pt yesterday and left her a VM that I would work on PA. I will update pt and pharmacy once I hear back from insurance.

## 2024-11-22 NOTE — TELEPHONE ENCOUNTER
I rec'd a fax that pt's insurance denied the Omeprazole because she hasn't tried and failed all of the preferred OTC meds. I called and spoke to her about-she has tried all of them except Lansoprazole. I advised that she try that for the next month and that I will regroup with her at the end of Dec. If that isn't helpful-we will re-start a PA for Omeprazole. Pt advised to call me back with any further questions/problems.

## 2025-01-03 DIAGNOSIS — Z17.21 CARCINOMA OF RIGHT BREAST, ESTROGEN AND PROGESTERONE RECEPTOR POSITIVE (HCC): ICD-10-CM

## 2025-01-03 DIAGNOSIS — Z17.0 CARCINOMA OF RIGHT BREAST, ESTROGEN AND PROGESTERONE RECEPTOR POSITIVE (HCC): ICD-10-CM

## 2025-01-03 DIAGNOSIS — C50.911 CARCINOMA OF RIGHT BREAST, ESTROGEN AND PROGESTERONE RECEPTOR POSITIVE (HCC): ICD-10-CM

## 2025-01-03 DIAGNOSIS — C50.919 INFILTRATING LOBULAR CARCINOMA OF BREAST IN FEMALE (HCC): ICD-10-CM

## 2025-01-03 RX ORDER — LETROZOLE 2.5 MG/1
TABLET, FILM COATED ORAL
Qty: 90 TABLET | Refills: 1 | Status: SHIPPED | OUTPATIENT
Start: 2025-01-03

## 2025-04-14 ENCOUNTER — TELEPHONE (OUTPATIENT)
Dept: HEMATOLOGY | Age: 60
End: 2025-04-14

## 2025-04-14 NOTE — TELEPHONE ENCOUNTER
Called pt. to remind them of appointment on 04/17/2025 and had to leave a detailed voicemail with appointment date and time. Reminded patient to just come at appointment time, and to not come at the lab appointment time. Reminded patient that we will not check them in any more than 30 minutes before appointment time. We have now moved to the [facility] that is located between our old office and the ER [facility] the [facility]. Letting the Pt know that our front entrance faces the [employer] fields and leaving our address. Reminded pt to eat well and be well hydrated for their labs.

## 2025-04-16 DIAGNOSIS — C50.919 INFILTRATING LOBULAR CARCINOMA OF BREAST IN FEMALE: Primary | ICD-10-CM

## 2025-04-16 DIAGNOSIS — Z78.0 POST-MENOPAUSAL: ICD-10-CM

## 2025-04-16 NOTE — PROGRESS NOTES
Progress Note      Pt Name: Josef Pool  YOB: 1965  MRN: [id number]    Date of evaluation: 04/17/2025  History Obtained From:  patient, electronic medical record    CHIEF COMPLAINT:    Chief Complaint   Patient presents with    Follow-up     Infiltrating lobular carcinoma of breast in female (HCC)     HISTORY OF PRESENT ILLNESS:    Josef Pool is a 60 y.o.  female who is currently being followed for infiltrating lobular carcinoma of the right breast, ER/AL positive, HER2 negative, grade 2, diagnosed February 2022.  She is status post bilateral mastectomy, adjuvant radiation therapy and current recommendation is for adjuvant endocrine therapy with letrozole 2.5 mg for anticipated 5 years through March 2027.  She has had no known evidence to suggest recurrent disease.  She also has knee CHEK-2 gene mutation with a strong family history of breast cancer, she is up-to-date on routine health screening.  Josef returns today in scheduled follow-up for evaluation for monitoring, side effect monitoring and further treatment recommendations.      History of Present Illness  No new health concerns have been reported since the last visit. Letrozole is being tolerated well, with no hot flashes noted. Persistent joint pain is experienced, though it is not attributed to letrozole.     Self-breast exams are being conducted regularly. Calcium and vitamin D supplements are currently being taken.       Today's clinic visit to include physical assessment, review of systems, any lab or radiographic findings that were available and plan of care are documented below.    ONCOLOGIC HISTORY:     Diagnosis  Infiltrating lobular carcinoma, right breast, Feb 2022  Favor grade 2  ER 65%, AL 65%, HER-2 negative, Ki67 8%  [id number]  CHEK-2 mutation, April 2022  Oncotype DX Recurrence Score 19     Treatment Summary  3/3/22 Initiate endocrine hormone therapy with Letrozole 2.5mg daily   4/8/2022

## 2025-04-17 ENCOUNTER — HOSPITAL ENCOUNTER (OUTPATIENT)
Dept: INFUSION THERAPY | Age: 60
Discharge: HOME | End: 2025-04-17
Payer: COMMERCIAL

## 2025-04-17 ENCOUNTER — OFFICE VISIT (OUTPATIENT)
Dept: HEMATOLOGY | Age: 60
End: 2025-04-17
Payer: COMMERCIAL

## 2025-04-17 VITALS
BODY MASS INDEX: 38.6 KG/M2 | TEMPERATURE: 97.7 F | HEART RATE: 65 BPM | SYSTOLIC BLOOD PRESSURE: 136 MMHG | DIASTOLIC BLOOD PRESSURE: 82 MMHG | WEIGHT: 226.1 LBS | OXYGEN SATURATION: 97 % | HEIGHT: 64 IN

## 2025-04-17 DIAGNOSIS — Z78.0 POST-MENOPAUSAL: ICD-10-CM

## 2025-04-17 DIAGNOSIS — Z15.02 CHEK2-RELATED BREAST CANCER (HCC): Primary | ICD-10-CM

## 2025-04-17 DIAGNOSIS — C50.911 CARCINOMA OF RIGHT BREAST, ESTROGEN AND PROGESTERONE RECEPTOR POSITIVE (HCC): ICD-10-CM

## 2025-04-17 DIAGNOSIS — Z51.81 ENCOUNTER FOR MONITORING AROMATASE INHIBITOR THERAPY: ICD-10-CM

## 2025-04-17 DIAGNOSIS — Z17.0 CARCINOMA OF RIGHT BREAST, ESTROGEN AND PROGESTERONE RECEPTOR POSITIVE (HCC): ICD-10-CM

## 2025-04-17 DIAGNOSIS — Z15.09 CHEK2-RELATED BREAST CANCER (HCC): Primary | ICD-10-CM

## 2025-04-17 DIAGNOSIS — Z15.89 CHEK2-RELATED BREAST CANCER (HCC): Primary | ICD-10-CM

## 2025-04-17 DIAGNOSIS — C50.919 INFILTRATING LOBULAR CARCINOMA OF BREAST IN FEMALE (HCC): ICD-10-CM

## 2025-04-17 DIAGNOSIS — Z17.21 CARCINOMA OF RIGHT BREAST, ESTROGEN AND PROGESTERONE RECEPTOR POSITIVE (HCC): ICD-10-CM

## 2025-04-17 DIAGNOSIS — Z91.89 AT RISK FOR BONE DENSITY LOSS: ICD-10-CM

## 2025-04-17 DIAGNOSIS — C50.919 CHEK2-RELATED BREAST CANCER (HCC): Primary | ICD-10-CM

## 2025-04-17 DIAGNOSIS — Z79.811 ENCOUNTER FOR MONITORING AROMATASE INHIBITOR THERAPY: ICD-10-CM

## 2025-04-17 LAB
25(OH)D3 SERPL-MCNC: 29.8 NG/ML
ALBUMIN SERPL-MCNC: 4.2 G/DL (ref 3.5–5.2)
ALP SERPL-CCNC: 84 U/L (ref 35–104)
ALT SERPL-CCNC: 23 U/L (ref 5–33)
ANION GAP SERPL CALCULATED.3IONS-SCNC: 11 MMOL/L (ref 7–19)
AST SERPL-CCNC: 21 U/L (ref 5–32)
BASOPHILS # BLD: 0.02 K/UL (ref 0–0.2)
BASOPHILS NFR BLD: 0.3 % (ref 0–1)
BILIRUB SERPL-MCNC: 0.4 MG/DL (ref 0–1.2)
BUN SERPL-MCNC: 14 MG/DL (ref 6–20)
CALCIUM SERPL-MCNC: 9.1 MG/DL (ref 8.6–10)
CHLORIDE SERPL-SCNC: 101 MMOL/L (ref 98–107)
CO2 SERPL-SCNC: 28 MMOL/L (ref 22–29)
CREAT SERPL-MCNC: 0.7 MG/DL (ref 0.5–0.9)
EOSINOPHIL # BLD: 0.17 K/UL (ref 0–0.6)
EOSINOPHIL NFR BLD: 2.2 % (ref 0–5)
ERYTHROCYTE [DISTWIDTH] IN BLOOD BY AUTOMATED COUNT: 13.7 % (ref 11.5–14.5)
GLUCOSE SERPL-MCNC: 217 MG/DL (ref 70–99)
HCT VFR BLD AUTO: 34 % (ref 37–47)
HGB BLD-MCNC: 11.6 G/DL (ref 12–16)
LYMPHOCYTES # BLD: 1.56 K/UL (ref 1.1–4.5)
LYMPHOCYTES NFR BLD: 19.8 % (ref 20–40)
MCH RBC QN AUTO: 32.7 PG (ref 27–31)
MCHC RBC AUTO-ENTMCNC: 34.1 G/DL (ref 33–37)
MCV RBC AUTO: 95.8 FL (ref 81–99)
MONOCYTES # BLD: 0.59 K/UL (ref 0–0.9)
MONOCYTES NFR BLD: 7.5 % (ref 1–10)
NEUTROPHILS # BLD: 5.49 K/UL (ref 1.5–7.5)
NEUTS SEG NFR BLD: 69.7 % (ref 50–65)
PLATELET # BLD AUTO: 184 K/UL (ref 130–400)
PMV BLD AUTO: 11.5 FL (ref 9.4–12.3)
POTASSIUM SERPL-SCNC: 4.4 MMOL/L (ref 3.5–5.1)
PROT SERPL-MCNC: 7.1 G/DL (ref 6.4–8.3)
RBC # BLD AUTO: 3.55 M/UL (ref 4.2–5.4)
SODIUM SERPL-SCNC: 140 MMOL/L (ref 136–145)
WBC # BLD AUTO: 7.87 K/UL (ref 4.8–10.8)

## 2025-04-17 PROCEDURE — 99212 OFFICE O/P EST SF 10 MIN: CPT

## 2025-04-17 PROCEDURE — 82306 VITAMIN D 25 HYDROXY: CPT

## 2025-04-17 PROCEDURE — 85025 COMPLETE CBC W/AUTO DIFF WBC: CPT

## 2025-04-17 PROCEDURE — 36415 COLL VENOUS BLD VENIPUNCTURE: CPT

## 2025-04-17 PROCEDURE — 99214 OFFICE O/P EST MOD 30 MIN: CPT | Performed by: NURSE PRACTITIONER

## 2025-04-17 PROCEDURE — 80053 COMPREHEN METABOLIC PANEL: CPT

## 2025-04-17 PROCEDURE — G2211 COMPLEX E/M VISIT ADD ON: HCPCS | Performed by: NURSE PRACTITIONER

## 2025-04-17 RX ORDER — SACUBITRIL AND VALSARTAN 24; 26 MG/1; MG/1
1 TABLET, FILM COATED ORAL 2 TIMES DAILY WITH MEALS
COMMUNITY
Start: 2025-03-21

## 2025-04-17 RX ORDER — PHENOL 1.4 %
1 AEROSOL, SPRAY (ML) MUCOUS MEMBRANE DAILY
COMMUNITY

## 2025-04-17 RX ORDER — LETROZOLE 2.5 MG/1
2.5 TABLET, FILM COATED ORAL DAILY
Qty: 90 TABLET | Refills: 2 | Status: SHIPPED | OUTPATIENT
Start: 2025-04-17

## 2025-04-21 ENCOUNTER — RESULTS FOLLOW-UP (OUTPATIENT)
Dept: HEMATOLOGY | Age: 60
End: 2025-04-21

## 2025-04-21 ASSESSMENT — ENCOUNTER SYMPTOMS
EYE PAIN: 0
DIARRHEA: 0
EYE DISCHARGE: 0
NAUSEA: 0
VOMITING: 0
SORE THROAT: 0
ABDOMINAL PAIN: 0
WHEEZING: 0
COUGH: 0
EYE REDNESS: 0
BLOOD IN STOOL: 0
RESPIRATORY NEGATIVE: 1
GASTROINTESTINAL NEGATIVE: 1
CONSTIPATION: 0
SHORTNESS OF BREATH: 0
EYES NEGATIVE: 1
BACK PAIN: 0

## 2025-04-28 NOTE — TELEPHONE ENCOUNTER
Have attempted to call patient and have left 2 messages for her to call the clinic back to go over lab results.  No call to date.

## 2025-04-28 NOTE — TELEPHONE ENCOUNTER
----- Message from Linda MIDDLETON sent at 4/21/2025  6:55 AM CDT -----  Please call and discuss, vitamin D level is marginally low, recommend adding vitamin D 1000 units p.o. daily to regimen.  Will repeat vitamin D level upon return on 10/16/2025

## 2025-06-13 ENCOUNTER — TELEPHONE (OUTPATIENT)
Dept: ORTHOPEDIC SURGERY | Facility: HOSPITAL | Age: 60
End: 2025-06-13

## 2025-06-13 NOTE — TELEPHONE ENCOUNTER
Previously spoke with patient to notify of the approval of joint replacement surgery through the [facility] program with Dr. Johnson. Possible surgery dates were discussed. Today, the patient decided on a surgery date of July 15, 2025. Reviewed program criteria. Nurse navigator advised that pre-op clearance, lab work and EKG will need to be provided by their PCP.  Emerson will also be required to obtain cardiology clearance and a request has roxane faxed to her cardiologist office. Discussed that additional testing and/or clearances may be needed.  Discussed that patient will be set up for [facility] and that joint replacement class and questionnaires will need to be complete prior to surgery. Confirmed mode of travel and advised that patient will receive additional information from [employer] in regards to itinerary, ID card and pre-paid spending card.

## 2025-06-29 ENCOUNTER — TRANSCRIBE ORDERS (OUTPATIENT)
Dept: OPERATING ROOM | Facility: HOSPITAL | Age: 60
End: 2025-06-29
Payer: COMMERCIAL

## 2025-06-29 DIAGNOSIS — M17.12 UNILATERAL PRIMARY OSTEOARTHRITIS, LEFT KNEE: Primary | ICD-10-CM

## 2025-06-30 PROBLEM — M17.12 UNILATERAL PRIMARY OSTEOARTHRITIS, LEFT KNEE: Status: ACTIVE | Noted: 2025-06-29

## 2025-07-02 ENCOUNTER — TELEPHONE (OUTPATIENT)
Dept: OPERATING ROOM | Facility: HOSPITAL | Age: 60
End: 2025-07-02
Payer: COMMERCIAL

## 2025-07-02 NOTE — TELEPHONE ENCOUNTER
Spoke with patient to discuss surgery preparation and travel. Advised to call [facility] if they have not received their pre-paid spending card, temporary insurance identification card and itinerary. Advised that Hibiclens body wash should be used on hair and body daily, as per instructed on bottle, beginning four days before surgery and including the day of surgery for a total of five uses and that Chlorhexadine Gluconate mouthwash should be used, as instructed, the evening before surgery and the morning of surgery. Encouraged the use of Miralax postoperatively while taking narcotic pain medication. Reviewed home medications and advised patient which medications should be temporarily discontinued in relation to surgery. Patient verbalized understanding. Stressed the importance of staying hydrated in the days leading up to surgery. Discussed how to pack for travel, including loose-fitting clothing, shoes with good support, home medications and any items of comfort needed from home. Advised on which DME items are provided by  as part of the [facility] program. Outlined any outstanding requirements needed for surgery prior to travel (viewing pre-op class video and completing questionnaires). Instructed patient on what time to arrive at [facility] for their appointments and where to meet the nurse navigator. Ensured that patient is aware of phone numbers to call for support. All questions answered at this time.

## 2025-07-07 ENCOUNTER — APPOINTMENT (OUTPATIENT)
Dept: PREADMISSION TESTING | Facility: HOSPITAL | Age: 60
End: 2025-07-07
Payer: COMMERCIAL

## 2025-07-11 ENCOUNTER — CLINICAL SUPPORT (OUTPATIENT)
Dept: PREADMISSION TESTING | Facility: HOSPITAL | Age: 60
End: 2025-07-11
Payer: COMMERCIAL

## 2025-07-11 VITALS — HEIGHT: 63 IN | WEIGHT: 220 LBS | BODY MASS INDEX: 38.98 KG/M2

## 2025-07-11 RX ORDER — LETROZOLE 2.5 MG/1
2.5 TABLET, FILM COATED ORAL DAILY
COMMUNITY

## 2025-07-11 RX ORDER — CARVEDILOL 25 MG/1
25 TABLET ORAL
COMMUNITY
Start: 2025-05-31

## 2025-07-11 RX ORDER — MELOXICAM 15 MG/1
1 TABLET ORAL
COMMUNITY
Start: 2025-05-05

## 2025-07-11 RX ORDER — OMEPRAZOLE 40 MG/1
40 CAPSULE, DELAYED RELEASE ORAL DAILY
COMMUNITY

## 2025-07-11 RX ORDER — MAGNESIUM 250 MG
250 TABLET ORAL
COMMUNITY

## 2025-07-11 RX ORDER — MUPIROCIN 20 MG/G
OINTMENT TOPICAL 2 TIMES DAILY
COMMUNITY
Start: 2025-07-09

## 2025-07-11 RX ORDER — ROSUVASTATIN CALCIUM 10 MG/1
1 TABLET, COATED ORAL
COMMUNITY
Start: 2025-06-09

## 2025-07-11 RX ORDER — ZINC GLUCONATE 50 MG
50 TABLET ORAL DAILY
COMMUNITY

## 2025-07-11 RX ORDER — DOXYCYCLINE 100 MG/1
1 CAPSULE ORAL
COMMUNITY
Start: 2025-07-09

## 2025-07-11 RX ORDER — MULTIVIT-MIN/IRON FUM/FOLIC AC 7.5 MG-4
1 TABLET ORAL DAILY
COMMUNITY

## 2025-07-11 RX ORDER — ASPIRIN 81 MG/1
81 TABLET ORAL DAILY
COMMUNITY

## 2025-07-11 RX ORDER — FOLIC ACID 1 MG/1
1 TABLET ORAL
COMMUNITY
Start: 2025-03-03

## 2025-07-11 RX ORDER — UPADACITINIB 15 MG/1
15 TABLET, EXTENDED RELEASE ORAL
COMMUNITY

## 2025-07-11 RX ORDER — SACUBITRIL AND VALSARTAN 24; 26 MG/1; MG/1
1 TABLET, FILM COATED ORAL
COMMUNITY

## 2025-07-11 RX ORDER — VIT C/E/ZN/COPPR/LUTEIN/ZEAXAN 250MG-90MG
500 CAPSULE ORAL DAILY
COMMUNITY

## 2025-07-11 RX ORDER — LEVOTHYROXINE SODIUM 200 UG/1
1 TABLET ORAL
COMMUNITY
Start: 2025-05-31

## 2025-07-11 ASSESSMENT — ENCOUNTER SYMPTOMS
FEVER: 0
VOMITING: 0
SINUS CONGESTION: 0
RHINORRHEA: 0
COUGH: 0
DIFFICULTY URINATING: 0
NERVOUS/ANXIOUS: 0
WOUND: 0
BRUISES/BLEEDS EASILY: 0
CHILLS: 0
NAUSEA: 0
AGITATION: 0
NECK PAIN: 0
ABDOMINAL PAIN: 0
ARTHRALGIAS: 1
EYE PAIN: 0
DIARRHEA: 0
SHORTNESS OF BREATH: 0
EYE DISCHARGE: 0

## 2025-07-11 NOTE — CPM/PAT NURSE NOTE
CPM/PAT Nurse Note      Name: Emerson Araujo (Emerson Araujo)  /Age: 1965/60 y.o.       Medical History[1]    Surgical History[2]    Patient  reports being sexually active and has had partner(s) who are male. She reports using the following methods of birth control/protection: Post-menopausal and Male Sterilization.    Family History[3]    Allergies[4]    Prior to Admission medications    Medication Sig Start Date End Date Taking? Authorizing Provider   carvedilol (Coreg) 25 mg tablet Take 1 tablet (25 mg) by mouth 2 times daily (morning and late afternoon). 25  Yes Historical Provider, MD   doxycycline (Monodox) 100 mg capsule Take 1 capsule (100 mg) by mouth every 12 hours. 25  Yes Historical Provider, MD   folic acid (Folvite) 1 mg tablet Take 1 tablet (1 mg) by mouth early in the morning.. 3/3/25  Yes Historical Provider, MD   levothyroxine (Synthroid, Levoxyl) 200 mcg tablet Take 1 tablet (200 mcg) by mouth early in the morning.. 25  Yes Historical Provider, MD   meloxicam (Mobic) 15 mg tablet Take 1 tablet (15 mg) by mouth early in the morning.. 25  Yes Historical Provider, MD   mupirocin (Bactroban) 2 % ointment Apply topically 2 times a day. 25  Yes Historical Provider, MD   rosuvastatin (Crestor) 10 mg tablet Take 1 tablet (10 mg) by mouth early in the morning.. 25  Yes Historical Provider, MD   aspirin 81 mg EC tablet Take 1 tablet (81 mg) by mouth once daily.    Historical Provider, MD   calcium carbonate/vitamin D3 (CALCIUM 500 + D, D3, ORAL) Take by mouth.    Historical Provider, MD   chlorhexidine (Hibiclens) 4 % external liquid For use on hair and body daily, beginning 4 days before surgery and including the morning of surgery for a total of 5 uses. 25   Annamarie Handy PA-C   chlorhexidine (Peridex) 0.12 % solution Gargle, swish and spit. For use the evening before surgery and the morning of surgery. Discard unused portion. 25   Annamarie Handy PA-C    cyanocobalamin (Vitamin B-12) 500 mcg tablet Take 1 tablet (500 mcg) by mouth once daily.    Historical Provider, MD   Entresto 24-26 mg tablet Take 1 tablet by mouth 2 times daily (morning and late afternoon).    Historical Provider, MD   letrozole (Femara) 2.5 mg tablet Take 1 tablet (2.5 mg total) by mouth once daily.    Historical Provider, MD   magnesium 250 mg tablet Take 1 tablet (250 mg) by mouth once daily.    Historical Provider, MD   multivitamin with minerals tablet Take 1 tablet by mouth once daily.    Historical Provider, MD   omeprazole (PriLOSEC) 40 mg DR capsule Take 1 capsule (40 mg) by mouth once daily.    Historical Provider, MD   polyethylene glycol (Glycolax, Miralax) 17 gram/dose powder Mix of powder and drink. For use to prevent constipation associated with narcotic pain medication use. Use daily, beginning the day after surgery and continue daily while taking narcotic pain medication. 6/13/25   Annamarie Handy PA-C   Rinvoq 15 mg tablet extended release 24 hr Take 1 tablet (15 mg) by mouth once daily.    Historical Provider, MD   zinc gluconate 50 mg elemental tablet Take 1 tablet by mouth once daily.    Historical Provider, MD        PAT ROS:   Constitutional:    no fever   no chills  Neuro/Psych:    no agitation   not nervous/anxious  Eyes:    no discharge   no pain   use of corrective lenses  Ears:    no ear pain   no ear discharge   no hearing aides  Nose:    Patient reports 7-11-25 she was started on doxycycline and mupirocin on 7-9-25 due to her MRSA test   no nasal discharge   no sinus congestion  Mouth:   Throat:    no throat pain  Neck:    no neck pain  Cardio:    no chest pain  Respiratory:    no cough   no shortness of breath  Endocrine:   GI:    no abdominal pain   no diarrhea   no nausea   no vomiting  :    no difficulty urinating  Musculoskeletal:    arthralgias  Hematologic:    does not bruise/bleed easily   no history of blood transfusion   no blood clots  Skin:   no  sores/wound   no rash      DASI Risk Score      Flowsheet Row Questionnaire Series Submission from 7/2/2025 in [facility] OR with Generic Provider Shannan   Can you take care of yourself (eat, dress, bathe, or use toilet)?  2.75  filed at 07/02/2025 1334   Can you walk indoors, such as around your house? 1.75  filed at 07/02/2025 1334   Can you walk a block or two on level ground?  0  filed at 07/02/2025 1334   Can you climb a flight of stairs or walk up a hill? 5.5  filed at 07/02/2025 1334   Can you run a short distance? 0  filed at 07/02/2025 1334   Can you do light work around the house like dusting or washing dishes? 2.7  filed at 07/02/2025 1334   Can you do moderate work around the house like vacuuming, sweeping floors or carrying groceries? 3.5  filed at 07/02/2025 1334   Can you do heavy work around the house like scrubbing floors or lifting and moving heavy furniture?  0  filed at 07/02/2025 1334   Can you do yard work like raking leaves, weeding or pushing a mower? 0  filed at 07/02/2025 1334   Can you have sexual relations? 0  filed at 07/02/2025 1334   Can you participate in moderate recreational activities like golf, bowling, dancing, doubles tennis or throwing a baseball or football? 0  filed at 07/02/2025 1334   Can you participate in strenous sports like swimming, singles tennis, football, basketball, or skiing? 0  filed at 07/02/2025 1334   DASI SCORE 16.2  filed at 07/02/2025 1334   METS Score (Will be calculated only when all the questions are answered) 4.7  filed at 07/02/2025 1334          Caprini DVT Assessment    No data to display       Modified Frailty Index    No data to display       [id number] Stroke Risk Points  Current as of just now        N/A 0 to 9 Points:      Last Change: N/A          The [id number] risk score (Lip YOVANNY, et al. 2009. © 2010 [employer]) quantifies the risk of stroke for a patient with atrial fibrillation. For patients  without atrial fibrillation or under the age of 18 this score appears as N/A. Higher score values generally indicate higher risk of stroke.        This score is not applicable to this patient. Components are not calculated.          Revised Cardiac Risk Index    No data to display       Apfel Simplified Score    No data to display       Risk Analysis Index Results This Encounter    No data found in the last 10 encounters.       Stop Bang Score      Flowsheet Row Clinical Support from 7/11/2025 in [facility] Questionnaire Series Submission from 7/2/2025 in [facility] OR with Generic Provider Shannan   Do you snore loudly? 1 filed at 07/11/2025 0934 1  filed at 07/02/2025 1334   Do you often feel tired or fatigued after your sleep? 1 filed at 07/11/2025 0934 1  filed at 07/02/2025 1334   Has anyone ever observed you stop breathing in your sleep? 1 filed at 07/11/2025 0934 0  filed at 07/02/2025 1334   Do you have or are you being treated for high blood pressure? 1 filed at 07/11/2025 0934 1  filed at 07/02/2025 1334   Recent BMI (Calculated) 39 filed at 07/11/2025 0934 0  filed at 07/02/2025 1334   Is BMI greater than 35 kg/m2? 1=Yes filed at 07/11/2025 0934 --   Age older than 50 years old? 1=Yes filed at 07/11/2025 0934 1=Yes  filed at 07/02/2025 1334   Gender - Male 0=No filed at 07/11/2025 0934 0=No  filed at 07/02/2025 1334          Prodigy: High Risk  Total Score: 7              Prodigy CHF score          ARISCAT Score for Postoperative Pulmonary Complications    No data to display       [place] Perioperative Risk for Myocardial Infarction or Cardiac Arrest (ABIMBOLA)    No data to display         Nurse Plan of Action:                [1]   Past Medical History:  Diagnosis Date    Anemia     Only sometimes, other times I have been told i have think blood.    Arrhythmia 5/2022    Cardiomyopathy, after 4/8/22 double mastectomy  wore life vest for 3 months.    Arthritis 2017    RA and OA  per pt report 25    Autoimmune disorder (Multi) ?    Rheumatoid arthritis    Breast cancer 2021    Found positive  removed -double mastectomy 2022    Cardiomyopathy 2022    After mastectomy, failed stress test then wore life vest 3 months.    CHF (congestive heart failure)     Cholelithiasis     Chronic headaches ?    Now I drink coffee and the headaches don't hit as hard or as often.    Chronic pain disorder ?    Gets real bad and then the barametric pressure changes then different bones and joints flare up. I am told that inflammation plays a big role in my pain.    Colon polyp     Last colonoscopy all clear     Depression 200?    The doctor put on my social security file.    Dizziness ?    Was put on dramamine and told i would have to take for the rest of my life. I stopped taking after 1 year.    Epistaxis     childhood- had nose cauterized in childhood and no further issues    Esophageal disease     Gerd    Fractures ?    Broke my little finger on my right hand when i was young.    GERD (gastroesophageal reflux disease)     Gerd  was put on omeprazole    Hyperlipidemia     Hypertension 2022    Hypothyroidism     Dr Wai Trotter put me on levothyroxin    Joint pain ?    Diagnosed with rheumatoid arthritis with Dr Forest Maya    Lumbar disc disease 200?    Deteriorating disc L3 & L4, [place], [place]    Motion sickness     [employer] off balance    Prematurity ([facility]) 1990, May 1991    2 miscarriages    Scoliosis ?    Slight case of scoliosis    Sleep apnea     Substance addiction (Multi)     Clean & Sober since 3/25/1993 (that is the date i went back to AA/NA)    Urinary tract infection    [2]   Past Surgical History:  Procedure Laterality Date     SECTION, LOW TRANSVERSE  1992    First live birth - breech baby    CHOLECYSTECTOMY  ?    COLONOSCOPY          HYSTERECTOMY      MASTECTOMY   04/08/2022    UPPER GASTROINTESTINAL ENDOSCOPY     [3]   Family History  Problem Relation Name Age of Onset    Arthritis Mother Fátima Serrano     Vision loss Mother Fátima Serrano 40 - 49    Hearing loss Mother Fátima Serrano 60 - 69    Diabetes Father Roman Valenciaski     Hypertension Father Roman Mimsi     Vision loss Father Roman Valenciaski 50 - 59    Depression Father Roman Valenciaski 40 - 49    Hearing loss Father Roman Valenci[place]i 50 - 59    Diabetes Maternal Grandmother Valery Sarah Rosado     Hypertension Maternal Grandmother Valery Sarah Rosado     Heart disease Maternal Grandmother Valery Sarah Rosado     Vision loss Maternal Grandmother Valery Sarah Fontenoti 20 - 29    Stroke Maternal Grandfather Melchor Rosado     Alcohol abuse Maternal Grandfather Melchor Rosado     Diabetes Paternal Grandfather Roman Valenciaski     Alcohol abuse Paternal Grandfather Roman Cramer 20 - 29    Cancer Mother's Sister Bev Juliocesar 40 - 49    Cancer Cousin Florinda Jimenez 40 - 49   [4]   Allergies  Allergen Reactions    Barium Iodide Anaphylaxis    Iodinated Contrast Media Unknown    Sulfa (Sulfonamide Antibiotics) Unknown    Atorvastatin Myalgia    Iopamidol Rash

## 2025-07-14 ENCOUNTER — PRE-ADMISSION TESTING (OUTPATIENT)
Dept: PREADMISSION TESTING | Facility: HOSPITAL | Age: 60
End: 2025-07-14
Payer: COMMERCIAL

## 2025-07-14 ENCOUNTER — HOSPITAL ENCOUNTER (OUTPATIENT)
Dept: RADIOLOGY | Facility: HOSPITAL | Age: 60
Discharge: HOME | End: 2025-07-14
Payer: COMMERCIAL

## 2025-07-14 ENCOUNTER — OFFICE VISIT (OUTPATIENT)
Dept: ORTHOPEDIC SURGERY | Facility: HOSPITAL | Age: 60
End: 2025-07-14
Payer: COMMERCIAL

## 2025-07-14 VITALS
TEMPERATURE: 98.2 F | RESPIRATION RATE: 18 BRPM | BODY MASS INDEX: 38.98 KG/M2 | OXYGEN SATURATION: 95 % | SYSTOLIC BLOOD PRESSURE: 132 MMHG | WEIGHT: 220.02 LBS | HEIGHT: 63 IN | DIASTOLIC BLOOD PRESSURE: 77 MMHG | HEART RATE: 66 BPM

## 2025-07-14 DIAGNOSIS — M17.12 UNILATERAL PRIMARY OSTEOARTHRITIS, LEFT KNEE: Primary | ICD-10-CM

## 2025-07-14 DIAGNOSIS — M17.12 UNILATERAL PRIMARY OSTEOARTHRITIS, LEFT KNEE: ICD-10-CM

## 2025-07-14 PROCEDURE — 99204 OFFICE O/P NEW MOD 45 MIN: CPT

## 2025-07-14 PROCEDURE — E0143 WALKER FOLDING WHEELED W/O S: HCPCS | Performed by: ORTHOPAEDIC SURGERY

## 2025-07-14 PROCEDURE — 73562 X-RAY EXAM OF KNEE 3: CPT | Mod: LT

## 2025-07-14 PROCEDURE — 1036F TOBACCO NON-USER: CPT | Performed by: ORTHOPAEDIC SURGERY

## 2025-07-14 PROCEDURE — 99204 OFFICE O/P NEW MOD 45 MIN: CPT | Performed by: ORTHOPAEDIC SURGERY

## 2025-07-14 PROCEDURE — 77073 BONE LENGTH STUDIES: CPT

## 2025-07-14 PROCEDURE — 77073 BONE LENGTH STUDIES: CPT | Performed by: RADIOLOGY

## 2025-07-14 PROCEDURE — 73562 X-RAY EXAM OF KNEE 3: CPT | Mod: LEFT SIDE | Performed by: RADIOLOGY

## 2025-07-14 PROCEDURE — E0218 FLUID CIRC COLD PAD W PUMP: HCPCS | Performed by: ORTHOPAEDIC SURGERY

## 2025-07-14 ASSESSMENT — DUKE ACTIVITY SCORE INDEX (DASI)
CAN YOU PARTICIPATE IN STRENOUS SPORTS LIKE SWIMMING, SINGLES TENNIS, FOOTBALL, BASKETBALL, OR SKIING: NO
CAN YOU TAKE CARE OF YOURSELF (EAT, DRESS, BATHE, OR USE TOILET): YES
CAN YOU DO MODERATE WORK AROUND THE HOUSE LIKE VACUUMING, SWEEPING FLOORS OR CARRYING GROCERIES: YES
CAN YOU WALK A BLOCK OR TWO ON LEVEL GROUND: YES
CAN YOU DO LIGHT WORK AROUND THE HOUSE LIKE DUSTING OR WASHING DISHES: YES
CAN YOU DO HEAVY WORK AROUND THE HOUSE LIKE SCRUBBING FLOORS OR LIFTING AND MOVING HEAVY FURNITURE: NO
TOTAL_SCORE: 18.7
DASI METS SCORE: 5
CAN YOU WALK INDOORS, SUCH AS AROUND YOUR HOUSE: YES
CAN YOU PARTICIPATE IN MODERATE RECREATIONAL ACTIVITIES LIKE GOLF, BOWLING, DANCING, DOUBLES TENNIS OR THROWING A BASEBALL OR FOOTBALL: NO
CAN YOU DO YARD WORK LIKE RAKING LEAVES, WEEDING OR PUSHING A MOWER: NO
CAN YOU HAVE SEXUAL RELATIONS: YES
CAN YOU CLIMB A FLIGHT OF STAIRS OR WALK UP A HILL: NO
CAN YOU RUN A SHORT DISTANCE: NO

## 2025-07-14 ASSESSMENT — PAIN DESCRIPTION - DESCRIPTORS: DESCRIPTORS: SHARP

## 2025-07-14 ASSESSMENT — PAIN SCALES - GENERAL: PAINLEVEL_OUTOF10: 6

## 2025-07-14 ASSESSMENT — PAIN - FUNCTIONAL ASSESSMENT: PAIN_FUNCTIONAL_ASSESSMENT: 0-10

## 2025-07-14 NOTE — PREPROCEDURE INSTRUCTIONS
Medication List            Accurate as of July 14, 2025 12:20 PM. Always use your most recent med list.                aspirin 81 mg EC tablet  Additional Medication Adjustments for Surgery: Other (Comment)  Notes to patient: Follow prescriber preoperative instructions     CALCIUM 500 + D (D3) ORAL  Additional Medication Adjustments for Surgery: Take last dose 7 days before surgery  Notes to patient: Last dose preoperatively 7/7/2025     carvedilol 25 mg tablet  Commonly known as: Coreg  Medication Adjustments for Surgery: Take/Use as prescribed     * chlorhexidine 4 % external liquid  Commonly known as: Hibiclens  For use on hair and body daily, beginning 4 days before surgery and including the morning of surgery for a total of 5 uses.  Medication Adjustments for Surgery: Take/Use as prescribed     * chlorhexidine 0.12 % solution  Commonly known as: Peridex  Gargle, swish and spit. For use the evening before surgery and the morning of surgery. Discard unused portion.  Medication Adjustments for Surgery: Take/Use as prescribed     cyanocobalamin 500 mcg tablet  Commonly known as: Vitamin B-12  Additional Medication Adjustments for Surgery: Take last dose 7 days before surgery  Notes to patient: Last dose preoperatively 7/7/2025     doxycycline 100 mg capsule  Commonly known as: Monodox  Medication Adjustments for Surgery: Take/Use as prescribed     Entresto 24-26 mg tablet  Generic drug: sacubitriL-valsartan  Medication Adjustments for Surgery: Take last dose 1 day (24 hours) before surgery  Notes to patient: Last dose preoperatively 7/14/2025 AM dose only if applicable. If taken in evening last dose should be taken on 7/13/2025     folic acid 1 mg tablet  Commonly known as: Folvite  Additional Medication Adjustments for Surgery: Take last dose 7 days before surgery  Notes to patient: Last dose preoperatively 7/7/2025     letrozole 2.5 mg tablet  Commonly known as: Femara  Medication Adjustments for Surgery:  Take/Use as prescribed     levothyroxine 200 mcg tablet  Commonly known as: Synthroid, Levoxyl  Medication Adjustments for Surgery: Take/Use as prescribed     magnesium 250 mg tablet  Medication Adjustments for Surgery: Take/Use as prescribed     meloxicam 15 mg tablet  Commonly known as: Mobic  Additional Medication Adjustments for Surgery: Take last dose 7 days before surgery  Notes to patient: Last dose preoperatively 7/7/2025     multivitamin with minerals tablet  Additional Medication Adjustments for Surgery: Take last dose 7 days before surgery  Notes to patient: Last dose preoperatively 7/7/2025     mupirocin 2 % ointment  Commonly known as: Bactroban  Medication Adjustments for Surgery: Take/Use as prescribed     omeprazole 40 mg DR capsule  Commonly known as: PriLOSEC  Medication Adjustments for Surgery: Take/Use as prescribed     polyethylene glycol 17 gram/dose powder  Commonly known as: Glycolax, Miralax  Mix of powder and drink. For use to prevent constipation associated with narcotic pain medication use. Use daily, beginning the day after surgery and continue daily while taking narcotic pain medication.  Medication Adjustments for Surgery: Do Not take on the morning of surgery  Notes to patient: Last dose preoperatively 7/14/2025     Rinvoq 15 mg tablet extended release 24 hr  Generic drug: upadacitinib ER  Additional Medication Adjustments for Surgery: Other (Comment)  Notes to patient: Follow prescriber preoperative instructions     rosuvastatin 10 mg tablet  Commonly known as: Crestor  Medication Adjustments for Surgery: Take/Use as prescribed     zinc gluconate 50 mg elemental tablet  Additional Medication Adjustments for Surgery: Take last dose 7 days before surgery  Notes to patient: Last dose preoperatively 7/7/2025           * This list has 2 medication(s) that are the same as other medications prescribed for you. Read the directions carefully, and ask your doctor or other care provider to  review them with you.                NPO Instructions:     Do not eat any food after midnight the night before your surgery/procedure.  You may have clear liquids until TWO hours before surgery/procedure. This includes water, black tea/coffee, (no milk or cream) apple juice and electrolyte drinks (Gatorade).  You may chew gum up to TWO hours before your surgery/procedure.     Additional Instructions:      Seven/Six Days before Surgery:  Review your medication instructions, stop indicated medications  Five Days before Surgery:  Review your medication instructions, stop indicated medications  Begin using your Hibiclens  Three Days before Surgery:  Review your medication instructions, stop indicated medications  The Day before Surgery:  Start using 0.12% CHG mouthwash  No smoking or alcohol use 24 hours before surgery  Review your medication instructions, stop indicated medications  You will be contacted regarding the time of your arrival to facility and surgery time  Do not eat any food after Midnight  Day of Surgery:  Review your medication instructions, take indicated medications  If you have diabetes, please check your fasting blood sugar upon awakening.  If fasting blood sugar is <80 mg/dl, drink 100 ml of apple juice, time limit of 2 hours before  You may have clear liquids until TWO hours before surgery/procedure.  This includes water, black tea/coffee, (no milk or cream) apple juice and electrolyte drinks (Gatorade)  You may chew gum up to TWO hours before your surgery/procedure  Wear  comfortable loose fitting clothing  Do not use moisturizers, creams, lotions or perfume  All jewelry and valuables should be left at home     CONTACT SURGEON'S OFFICE IF YOU DEVELOP:  * Fever = 100.4 F   * New respiratory symptoms (e.g. cough, shortness of breath, respiratory distress, sore throat)  * Recent loss of taste or smell  *Flu like symptoms such as headache, fatigue or gastrointestinal symptoms  * You develop any open  sores, shingles, burning or painful urination   AND/OR:  * You no longer wish to have the surgery.  * Any other personal circumstances change that may lead to the need to cancel or defer this surgery.  *You were admitted to any hospital within one week of your planned procedure.     SMOKING:  *Quitting smoking can make a huge difference to your health and recovery from surgery.    *If you need help with quitting, call 4-034-QUIT-NOW.     THE DAY BEFORE SURGERY:  *Do not eat any food after midnight the night before your surgery.   *You may have up to TEN OUNCES of clear liquids until TWO hours before surgery/procedure.. This includes water, black tea/coffee, (no milk or cream) apple juice, clear broth and electrolyte drinks (Gatorade). Please avoid clear liquids that are red in color.   *You may chew gum/mints up to TWO hours before your surgery/procedure.     SURGICAL TIME:  *You will be contacted between 2 p.m. and 3 p.m. the business day before your surgery with your arrival time.  *If you haven't received a call by 3pm, call [phone]  *Scheduled surgery times may change and you will be notified if this occurs-check your personal voicemail for any updates.     ON THE MORNING OF SURGERY:  *Wear comfortable, loose fitting clothing.   *Do not use moisturizers, creams, lotions or perfume.  *All jewelry and valuables should be left at home.  *Prosthetic devices such as contact lenses, hearing aids, dentures, eyelash extensions, hairpins and body piercing must be removed before surgery.     BRING WITH YOU:  *Photo ID and insurance card  *Current list of medications and allergies  *Pacemaker/Defibrillator/Heart stent cards  *CPAP machine and mask  *Slings/splints/crutches  *Copy of your complete Advanced Directive/DHPOA-if applicable  *Neurostimulator implant remote     PARKING AND ARRIVAL:  *Check in at the Main Entrance desk and let them know you are here for surgery.     IF YOU ARE HAVING OUTPATIENT/SAME DAY  SURGERY:  *A responsible adult MUST accompany you at the time of discharge and stay with you for 24 hours after your surgery.  *You may NOT drive yourself home after surgery.  *You may use a taxi or ride sharing service ([employer], Uber) to return home ONLY if you are accompanied by a friend or family member.  *Instructions for resuming your medications will be provided by your surgeon.     Thank you for coming to Pre Admission testing.      If I have prescribed medication please don't forget to  at your pharmacy.      Any questions about today's visit call [phone] and leave a message in the general mailbox.     Patient instructed to ambulate as soon as possible postoperatively to decrease thromboembolic risk.     Dirk Hernandez, APRN-CNP     Thank you for visiting [facility].  If you have any changes to your health condition, please call the surgeons office to alert them and give them details of your symptoms.        Preoperative Fasting Guidelines     Why must I stop eating and drinking near surgery time?  With sedation, food or liquid in your stomach can enter your lungs causing serious complications  Increases nausea and vomiting     When do I need to stop eating and drinking before my surgery?  Do not eat any food after midnight the night before your surgery/procedure.  You may have up to TEN ounces of clear liquid until TWO hours before your surgery/procedure.  This includes water, black tea/coffee, (no milk or cream) apple juice, and electrolyte drinks (Gatorade)  You may chew gum until TWO hours before your surgery/procedure        Additional Instructions:      The Day before Surgery:  -Review your medication instructions, stop indicated medications  -You will be contacted in the evening regarding the time of your arrival to facility and surgery time     Day of Surgery:  -Review your medication instructions, take indicated medications  -Wear comfortable loose fitting  clothing  -Do not use moisturizers, creams, lotions or perfume  -All jewelry and valuables should be left at home                   Preoperative Brain Exercises     What are brain exercises?  A brain exercise is any activity that engages your thinking (cognitive) skills.     What types of activities are considered brain exercises?  Jigsaw puzzles, crossword puzzles, word jumble, memory games, word search, and many more.  Many can be found free online or on your phone via a mobile marlo.     Why should I do brain exercises before my surgery?  More recent research has shown brain exercise before surgery can lower the risk of postoperative delirium (confusion) which can be especially important for older adults.  Patients who did brain exercises for 5 to 10 minutes/day in the days before surgery, cut their risk of postoperative delirium in half up to 1 week after surgery.                         [facility]     Preoperative Deep Breathing Exercises     Why it is important to do deep breathing exercises before my surgery?  Deep breathing exercises strengthen your breathing muscles.  This helps you to recover after your surgery and decreases the chance of breathing complications.        How are the deep breathing exercises done?  Sit straight with your back supported.  Breathe in deeply and slowly through your nose. Your lower rib cage should expand and your abdomen may move forward.  Hold that breath for 3 to 5 seconds.  Breathe out through pursed lips, slowly and completely.  Rest and repeat 10 times every hour while awake.  Rest longer if you become dizzy or lightheaded.                      [facility]     Preoperative Deep Breathing Exercises     Why it is important to do deep breathing exercises before my surgery?  Deep breathing exercises strengthen your breathing muscles.  This helps you to recover after your surgery and decreases the chance of breathing  complications.        How are the deep breathing exercises done?  Sit straight with your back supported.  Breathe in deeply and slowly through your nose. Your lower rib cage should expand and your abdomen may move forward.  Hold that breath for 3 to 5 seconds.  Breathe out through pursed lips, slowly and completely.  Rest and repeat 10 times every hour while awake.  Rest longer if you become dizzy or lightheaded.        Patient Information: Incentive Spirometer  What is an incentive spirometer?  An incentive spirometer is a device used before and after surgery to “exercise” your lungs.  It helps you to take deeper breaths to expand your lungs.  Below is an example of a basic incentive spirometer.  The device you receive may differ slightly but they all function the same.    Why do I need to use an incentive spirometer?  Using your incentive spirometer prepares your lungs for surgery and helps prevent lung problems after surgery.  How do I use my incentive spirometer?  When you're using your incentive spirometer, make sure to breathe through your mouth. If you breathe through your nose, the incentive spirometer won't work properly. You can hold your nose if you have trouble.  If you feel dizzy at any time, stop and rest. Try again at a later time.  Follow the steps below:  Set up your incentive spirometer, expand the flexible tubing and connect to the outlet.  Sit upright in a chair or bed. Hold the incentive spirometer at eye level.   Put the mouthpiece in your mouth and close your lips tightly around it. Slowly breathe out (exhale) completely.  Breathe in (inhale) slowly through your mouth as deeply as you can. As you take a breath, you will see the piston rise inside the large column. While the piston rises, the indicator should move upwards. It should stay in between the 2 arrows (see Figure).  Try to get the piston as high as you can, while keeping the indicator between the arrows.   If the indicator doesn't  stay between the arrows, you're breathing either too fast or too slow.  When you get it as high as you can, hold your breath for 10 seconds, or as long as possible. While you're holding your breath, the piston will slowly fall to the base of the spirometer.  Once the piston reaches the bottom of the spirometer, breathe out slowly through your mouth. Rest for a few seconds.  Repeat 10 times. Try to get the piston to the same level with each breath.  Repeat every hour while awake  You can carefully clean the outside of the mouthpiece with an alcohol wipe or soap and water.       Patient and Family Education             Ways You Can Help Prevent Blood Clots                    This handout explains some simple things you can do to help prevent blood clots.      Blood clots are blockages that can form in the body's veins. When a blood clot forms in your deep veins, it may be called a deep vein thrombosis, or DVT for short. Blood clots can happen in any part of the body where blood flows, but they are most common in the arms and legs. If a piece of a blood clot breaks free and travels to the lungs, it is called a pulmonary embolus (PE). A PE can be a very serious problem.         Being in the hospital or having surgery can raise your chances of getting a blood clot because you may not be well enough to move around as much as you normally do.         Ways you can help prevent blood clots in the hospital           Wearing SCDs. SCDs stands for Sequential Compression Devices.   SCDs are special sleeves that wrap around your legs  They attach to a pump that fills them with air to gently squeeze your legs every few minutes.   This helps return the blood in your legs to your heart.   SCDs should only be taken off when walking or bathing.   SCDs may not be comfortable, but they can help save your life.                                            Wearing compression stockings - if your doctor orders them. These special snug  fitting stockings gently squeeze your legs to help blood flow.       Walking. Walking helps move the blood in your legs.   If your doctor says it is ok, try walking the halls at least   5 times a day. Ask us to help you get up, so you don't fall.      Taking any blood thinning medicines your doctor orders.        Page 1 of 2            [facility]; 3/23   Ways you can help prevent blood clots at home         Wearing compression stockings - if your doctor orders them. ? Walking - to help move the blood in your legs.       Taking any blood thinning medicines your doctor orders.      Signs of a blood clot or PE        Tell your doctor or nurse know right away if you have of the problems listed below.    If you are at home, seek medical care right away. Call 911 for chest pain or problems breathing.                Signs of a blood clot (DVT) - such as pain,  swelling, redness or warmth in your arm or leg      Signs of a pulmonary embolism (PE) - such as chest pain or feeling short of breath    Patient Information: Pre-Operative Infection Prevention Measures     Why did I have my nose, under my arms, and groin swabbed?  The purpose of the swab is to identify Staphylococcus aureus inside your nose or on your skin.  The swab was sent to the laboratory for culture.  A positive swab/culture for Staphylococcus aureus is called colonization or carriage.      What is Staphylococcus aureus?  Staphylococcus aureus, also known as “staph”, is a germ found on the skin or in the nose of healthy people.  Sometimes Staphylococcus aureus can get into the body and cause an infection.  This can be minor (such as pimples, boils, or other skin problems).  It might also be serious (such as a blood infection, pneumonia, or a surgical site infection).    What is Staphylococcus aureus colonization or carriage?  Colonization or carriage means that a person has the germ but is not sick from it.  These bacteria can be spread on the hands  or when breathing or sneezing.    How is Staphylococcus aureus spread?  It is most often spread by close contact with a person or item that carries it.    What happens if my culture is positive for Staphylococcus aureus?  Your doctor/medical team will use this information to guide any antibiotic treatment which may be necessary.  Regardless of the culture results, we will clean the inside of your nose with a betadine swab just before you have your surgery.      Will I get an infection if I have Staphylococcus aureus in my nose or on my skin?  Anyone can get an infection with Staphylococcus aureus.  However, the best way to reduce your risk of infection is to follow the instructions provided to you for the use of your CHG soap and dental rinse.        Patient Information: Oral/Dental Rinse    What is oral/dental rinse?   It is a mouthwash. It is a way of cleaning the mouth with a germ-killing solution before your surgery.  The solution contains chlorhexidine, commonly known as CHG.   It is used inside the mouth to kill a bacteria known as Staphylococcus aureus.  Let your doctor know if you are allergic to Chlorhexidine.    We have sent a prescription for CHG 0.12% mouthwash to your preferred pharmacy.  If you have not already, Please  your prescription and start using the day before before surgery.  Follow the instruction sheet provided to you at your CPM/PAT appointment. Please contact [facility] if you do not receive your CHG mouthwash prescription.     Why do I need to use CHG oral/dental rinse?  The CHG oral/dental rinse helps to kill a bacteria in your mouth known as Staphylococcus aureus.     This reduces the risk of infection at the surgical site.      Using your CHG oral/dental rinse  STEPS:  Use your CHG oral/dental rinse after you brush your teeth the night before (at bedtime) and the morning of your surgery.  Follow all directions on your prescription label.    Use the cap on the container to measure  15ml   Swish (gargle if you can) the mouthwash in your mouth for at least 30 seconds, (do not swallow) and spit out  After you use your CHG rinse, do not rinse your mouth with water, drink or eat.  Please refer to the prescription label for the appropriate time to resume oral intake      What side effects might I have using the CHG oral/dental rinse?  CHG rinse will stick to plaque on the teeth.  Brush and floss just before use.  Teeth brushing will help avoid staining of plaque during use.      Patient Information: Home Preoperative Antibacterial Shower      What is a home preoperative antibacterial shower?  This shower is a way of cleaning the skin with a germ-killing solution before surgery.  The solution contains chlorhexidine, commonly known as CHG.  CHG is a skin cleanser with germ-killing ability.  Let your doctor know if you are allergic to chlorhexidine.    Why do I need to take a preoperative antibacterial shower?  Skin is not sterile.  It is best to try to make your skin as free of germs as possible before surgery.  Proper cleansing with a germ-killing soap before surgery can lower the number of germs on your skin.  This helps to reduce the risk of infection at the surgical site.  Following the instructions listed below will help you prepare your skin for surgery.      How do I use the solution?  Steps:  Begin using your CHG soap 5 days before your scheduled surgery on 7/10/2025.    First, wash and rinse your hair using the CHG soap. Keep CHG soap away from ear canals and eyes.  Rinse completely, do not condition.  Hair extensions should be removed.  Wash your face with your normal soap and rinse.    Apply the CHG solution to a clean wet washcloth.  Turn the water off or move away from the water spray to avoid premature rinsing of the CHG soap as you are applying.   Firmly lather your entire body from the neck down.  Do not use on your face.  Pay special attention to the area(s) where your incision(s)  will be located unless they are on your face.  Avoid scrubbing your skin too hard.  The important point is to have the CHG soap sit on your skin for 3 minutes.    When the 3 minutes are up, turn on the water and rinse the CHG solution off your body completely.   DO NOT wash with regular soap after you have used the CHG soap solution  Pat yourself dry with a clean, freshly-laundered towel.  DO NOT apply powders, deodorants, or lotions.  Dress in clean, freshly laundered nightclothes.    Be sure to sleep with clean, freshly laundered sheets.  Be aware that CHG will cause stains on fabrics; if you wash them with bleach after use.  Rinse your washcloth and other linens that have contact with CHG completely.  Use only non-chlorine detergents to launder the items used.   The morning of surgery is the fifth day.  Repeat the above steps and dress in clean comfortable clothing     Whom should I contact if I have any questions regarding the use of CHG soap?  Call the [facility] at [phone] if you have any questions.      Preoperative Clearances  -If you are informed by the preadmission testing team that you need clearance for your surgery, please reach out to the provider in question to assisting accommodating obtaining your clearance.   -Please have you provider fax your clearance letter to [phone] if needed.   -A blank clearance letter will be provided to you if indicated.     Anticoagulation  -If you are on oral anticoagulation such as Coumadin, Eliquis, Xarelto, Plavix, Brilinta, Pradaxa; we will need to obtain preoperative anticoagulation instructions from the prescribing provider.   -We will reach out to the prescriber but do encourage you to call their office as well as it will increase the chances of getting the necessary information.   -We will contact you with the instruction once we obtain them.

## 2025-07-14 NOTE — CPM/PAT H&P
CPM/PAT Evaluation       Name: Emerson Araujo (Emerson Araujo)  /Age: 1965/60 y.o.     In-Person       Chief Complaint: Unilateral primary osteoarthritis, left knee     HPI  Patient is an alert and oriented 60 year old female scheduled for a left total knee arthroplasty on 7/15/2025 with Dr. Johnson for unilateral primary osteoarthritis, left knee. She endorses left knee pain that she currently rates at a 5/10 which worsens during ambulation and activity. She is ambulatory with a cane, current METS score of 5. PMHX includes OA, obesity, HTN, HLD, GERD, CHF, COPD, breast cancer, hypothyroidism, and RA. Presents to [facility] today for perioperative risk stratification and optimization.     Medical History[1]    Surgical History[2]    Patient  reports being sexually active and has had partner(s) who are male. She reports using the following methods of birth control/protection: Post-menopausal and Male Sterilization.    Family History[3]    Allergies[4]    Prior to Admission medications    Medication Sig Start Date End Date Taking? Authorizing Provider   aspirin 81 mg EC tablet Take 1 tablet (81 mg) by mouth once daily.    Historical Provider, MD   calcium carbonate/vitamin D3 (CALCIUM 500 + D, D3, ORAL) Take by mouth.    Historical Provider, MD   carvedilol (Coreg) 25 mg tablet Take 1 tablet (25 mg) by mouth 2 times daily (morning and late afternoon). 25   Historical Provider, MD   chlorhexidine (Hibiclens) 4 % external liquid For use on hair and body daily, beginning 4 days before surgery and including the morning of surgery for a total of 5 uses. 25   Annamarie Handy PA-C   chlorhexidine (Peridex) 0.12 % solution Gargle, swish and spit. For use the evening before surgery and the morning of surgery. Discard unused portion. 25   Annamarie Handy PA-C   cyanocobalamin (Vitamin B-12) 500 mcg tablet Take 1 tablet (500 mcg) by mouth once daily.    Historical Provider, MD   doxycycline (Monodox) 100  Pt report given to oncoming RN for continuation of care.    mg capsule Take 1 capsule (100 mg) by mouth every 12 hours. 7/9/25   Historical Provider, MD   Entresto 24-26 mg tablet Take 1 tablet by mouth 2 times daily (morning and late afternoon).    Historical Provider, MD   folic acid (Folvite) 1 mg tablet Take 1 tablet (1 mg) by mouth early in the morning.. 3/3/25   Historical Provider, MD   letrozole (Femara) 2.5 mg tablet Take 1 tablet (2.5 mg total) by mouth once daily.    Historical Provider, MD   levothyroxine (Synthroid, Levoxyl) 200 mcg tablet Take 1 tablet (200 mcg) by mouth early in the morning.. 5/31/25   Historical Provider, MD   magnesium 250 mg tablet Take 1 tablet (250 mg) by mouth once daily.    Historical Provider, MD   meloxicam (Mobic) 15 mg tablet Take 1 tablet (15 mg) by mouth early in the morning.. 5/5/25   Historical Provider, MD   multivitamin with minerals tablet Take 1 tablet by mouth once daily.    Historical Provider, MD   mupirocin (Bactroban) 2 % ointment Apply topically 2 times a day. 7/9/25   Historical Provider, MD   omeprazole (PriLOSEC) 40 mg DR capsule Take 1 capsule (40 mg) by mouth once daily.    Historical Provider, MD   polyethylene glycol (Glycolax, Miralax) 17 gram/dose powder Mix of powder and drink. For use to prevent constipation associated with narcotic pain medication use. Use daily, beginning the day after surgery and continue daily while taking narcotic pain medication. 6/13/25   Annamarie Handy PA-C   Rinvoq 15 mg tablet extended release 24 hr Take 1 tablet (15 mg) by mouth once daily.    Historical Provider, MD   rosuvastatin (Crestor) 10 mg tablet Take 1 tablet (10 mg) by mouth early in the morning.. 6/9/25   Historical Provider, MD   zinc gluconate 50 mg elemental tablet Take 1 tablet by mouth once daily.    Historical Provider, MD       Review of Systems   Constitutional: Negative for chills, decreased appetite, diaphoresis, fever and malaise/fatigue.   Eyes:  Negative for blurred vision and double vision.    Cardiovascular:  Negative for chest pain, claudication, cyanosis, irregular heartbeat, leg swelling, near-syncope and palpitations. Positive for dyspnea on exertion  Respiratory:  Negative for cough, hemoptysis, shortness of breath and wheezing.    Endocrine: Negative for cold intolerance, heat intolerance, polydipsia, polyphagia and polyuria.   Gastrointestinal:  Negative for abdominal pain, constipation, diarrhea, dysphagia, nausea and vomiting.   Genitourinary:  Negative for bladder incontinence, dysuria, hematuria, incomplete emptying, nocturia, frequency, pelvic pain and urgency.   Neurological:  Negative for headaches, light-headedness, paresthesias, sensory change and weakness.   Psychiatric/Behavioral:  Negative for altered mental status.    Musculoskeletal: Negative for myalgias. Positive for arthralgias     Vitals and nursing note reviewed.     Physical exam  Constitutional:       Appearance: Normal appearance. She is Obese.   HENT:      Head: Normocephalic and atraumatic.      Mouth/Throat:      Mouth: Mucous membranes are moist.      Pharynx: Oropharynx is clear.   Eyes:      Extraocular Movements: Extraocular movements intact.      Conjunctiva/sclera: Conjunctivae normal.      Pupils: Pupils are equal, round, and reactive to light.   Cardiovascular:      PMI at left midclavicular line. Normal rate. Regular rhythm. Normal S1. Normal S2.       Murmurs: Grade 1/6 SARWAT RUSB     No gallop.  No click. No rub.       No audible carotid bruit     No lower extremity edema on exam  Pulmonary:      Effort: Pulmonary effort is normal.      Breath sounds: Normal breath sounds.   Abdominal:      General: Abdomen is flat. Bowel sounds are normal.      Palpations: Abdomen is soft and non-tender  Musculoskeletal:      Cervical back: Normal range of motion and neck supple.      Knee, left: Limited ROM  Skin:     General: Skin is warm and dry.      Capillary Refill: Capillary refill takes less than 2 seconds.  "  Neurological:      General: No focal deficit present.      Mental Status: She is alert and oriented to person, place, and time. Mental status is at baseline.   Psychiatric:         Mood and Affect: Mood normal.         Behavior: Behavior normal.         Thought Content: Thought content normal.         Judgment: Judgment normal.     Vitals and nursing note reviewed. Physical exam within normal limits.     Visit Vitals  /77   Pulse 66   Temp 36.8 °C (98.2 °F) (Temporal)   Resp 18   Ht 1.6 m (5' 3\")   Wt 99.8 kg (220 lb 0.3 oz)   SpO2 95%   BMI 38.97 kg/m²   Smoking Status Former   BSA 2.11 m²     DASI Risk Score      Flowsheet Row Pre-Admission Testing from 7/14/2025 in [facility] Questionnaire Series Submission from 7/2/2025 in [facility] OR with Generic Provider Shannan   Can you take care of yourself (eat, dress, bathe, or use toilet)?  2.75 filed at 07/14/2025 1239 2.75  filed at 07/02/2025 1334   Can you walk indoors, such as around your house? 1.75 filed at 07/14/2025 1239 1.75  filed at 07/02/2025 1334   Can you walk a block or two on level ground?  2.75 filed at 07/14/2025 1239 0  filed at 07/02/2025 1334   Can you climb a flight of stairs or walk up a hill? 0 filed at 07/14/2025 1239 5.5  filed at 07/02/2025 1334   Can you run a short distance? 0 filed at 07/14/2025 1239 0  filed at 07/02/2025 1334   Can you do light work around the house like dusting or washing dishes? 2.7 filed at 07/14/2025 1239 2.7  filed at 07/02/2025 1334   Can you do moderate work around the house like vacuuming, sweeping floors or carrying groceries? 3.5 filed at 07/14/2025 1239 3.5  filed at 07/02/2025 1334   Can you do heavy work around the house like scrubbing floors or lifting and moving heavy furniture?  0 filed at 07/14/2025 1239 0  filed at 07/02/2025 1334   Can you do yard work like raking leaves, weeding or pushing a mower? 0 filed at 07/14/2025 1239 0  filed at 07/02/2025 1334   Can " you have sexual relations? 5.25 filed at 07/14/2025 1239 0  filed at 07/02/2025 1334   Can you participate in moderate recreational activities like golf, bowling, dancing, doubles tennis or throwing a baseball or football? 0 filed at 07/14/2025 1239 0  filed at 07/02/2025 1334   Can you participate in strenous sports like swimming, singles tennis, football, basketball, or skiing? 0 filed at 07/14/2025 1239 0  filed at 07/02/2025 1334   DASI SCORE 18.7 filed at 07/14/2025 1239 16.2  filed at 07/02/2025 1334   METS Score (Will be calculated only when all the questions are answered) 5 filed at 07/14/2025 1239 4.7  filed at 07/02/2025 1334          Caprini DVT Assessment      Flowsheet Row Pre-Admission Testing from 7/14/2025 in [facility]   DVT Score (IF A SCORE IS NOT CALCULATING, MUST SELECT A BMI TO COMPLETE) 13 filed at 07/14/2025 1238   Medical Factors Present cancer, chemotherapy, or previous malignancy, Congestive Heart Failure < 1 month, COPD filed at 07/14/2025 1238   Surgical Factors Elective major lower extremity arthroplasty filed at 07/14/2025 1238   BMI (BMI MUST BE CHOSEN) 31-40 (Obesity) filed at 07/14/2025 1238          Modified Frailty Index      Flowsheet Row Pre-Admission Testing from 7/14/2025 in [facility]   Non-independent functional status (problems with dressing, bathing, personal grooming, or cooking) 0 filed at 07/14/2025 1239   History of diabetes mellitus  0 filed at 07/14/2025 1239   History of COPD 0.0909 filed at 07/14/2025 1239   History of CHF 0.0909 filed at 07/14/2025 1239   History of MI 0 filed at 07/14/2025 1239   History of Percutaneous Coronary Intervention, Cardiac Surgery, or Angina No filed at 07/14/2025 1239   Hypertension requiring the use of medication  0.0909 filed at 07/14/2025 1239   Peripheral vascular disease 0 filed at 07/14/2025 1239   Impaired sensorium (cognitive impairement or loss, clouding, or delirium) 0 filed at 07/14/2025  1239   TIA or CVA withouy residual deficit 0 filed at 07/14/2025 1239   Cerebrovascular accident with deficit 0 filed at 07/14/2025 1239   Modified Frailty Index Calculator .2727 filed at 07/14/2025 1239          [id number] Stroke Risk Points  Current as of a minute ago        N/A 0 to 9 Points:      Last Change: N/A          The GCD2CK2-WKOh risk score (Lip YOVANNY, et al. 2009. © 2010 [employer]) quantifies the risk of stroke for a patient with atrial fibrillation. For patients without atrial fibrillation or under the age of 18 this score appears as N/A. Higher score values generally indicate higher risk of stroke.        This score is not applicable to this patient. Components are not calculated.          Revised Cardiac Risk Index      Flowsheet Row Pre-Admission Testing from 7/14/2025 in [facility]   High-Risk Surgery (Intraperitoneal, Intrathoracic,Suprainguinal vascular) 0 filed at 07/14/2025 1239   History of ischemic heart disease (History of MI, History of positive exercuse test, Current chest paint considered due to myocardial ischemia, Use of nitrate therapy, ECG with pathological Q Waves) 0 filed at 07/14/2025 1239   History of congestive heart failure (pulmonary edemia, bilateral rales or S3 gallop, Paroxysmal nocturnal dyspnea, CXR showing pulmonary vascular redistribution) 1 filed at 07/14/2025 1239   History of cerebrovascular disease (Prior TIA or stroke) 0 filed at 07/14/2025 1239   Pre-operative insulin treatment 0 filed at 07/14/2025 1239   Pre-operative creatinine>2 mg/dl 0 filed at 07/14/2025 1239   Revised Cardiac Risk Calculator 1 filed at 07/14/2025 1239          Apfel Simplified Score    No data to display       Risk Analysis Index Results This Encounter    No data found in the last 10 encounters.       Stop Bang Score      Flowsheet Row Pre-Admission Testing from 7/14/2025 in [facility] Clinical Support from 7/11/2025 in   [facility]   Do you snore loudly? 1 filed at 07/14/2025 1207 1 filed at 07/11/2025 0934   Do you often feel tired or fatigued after your sleep? 1 filed at 07/14/2025 1207 1 filed at 07/11/2025 0934   Has anyone ever observed you stop breathing in your sleep? 0 filed at 07/14/2025 1207 1 filed at 07/11/2025 0934   Do you have or are you being treated for high blood pressure? 1 filed at 07/14/2025 1207 1 filed at 07/11/2025 0934   Recent BMI (Calculated) 39 filed at 07/14/2025 1207 39 filed at 07/11/2025 0934   Is BMI greater than 35 kg/m2? 1=Yes filed at 07/14/2025 1207 1=Yes filed at 07/11/2025 0934   Age older than 50 years old? 1=Yes filed at 07/14/2025 1207 1=Yes filed at 07/11/2025 0934   Is your neck circumference greater than 17 inches (Male) or 16 inches (Female)? 0 filed at 07/14/2025 1207 --   Gender - Male 0=No filed at 07/14/2025 1207 0=No filed at 07/11/2025 0934   STOP-BANG Total Score 5 filed at 07/14/2025 1207 --          Prodigy: High Risk  Total Score: 7              Prodigy CHF score          ARISCAT Score for Postoperative Pulmonary Complications      Flowsheet Row Pre-Admission Testing from 7/14/2025 in [facility]   Age Calculated Score 3 filed at 07/14/2025 1239   Preoperative SpO2 8 filed at 07/14/2025 1239   Respiratory infection in the last month Either upper or lower (i.e., URI, bronchitis, pneumonia), with fever and antibiotic treatment 0 filed at 07/14/2025 1239   Preoperative anemia (Hgb less than 10 g/dl) 0 filed at 07/14/2025 1239   Surgical incision  0 filed at 07/14/2025 1239   Duration of surgery  16 filed at 07/14/2025 1239   Emergency Procedure  0 filed at 07/14/2025 1239   ARISCAT Total Score  27 filed at 07/14/2025 1239          Ghada Perioperative Risk for Myocardial Infarction or Cardiac Arrest (ABIMBOLA)      Flowsheet Row Pre-Admission Testing from 7/14/2025 in [facility]   Calculated Age Score 1.16 filed at 07/14/2025 1249    Functional Status  0 filed at 07/14/2025 1240   ASA Class  -1.92 filed at 07/14/2025 1240   Creatinine 0 filed at 07/14/2025 1240   Type of Procedure  0.80 filed at 07/14/2025 1240   ABIMBOLA Total Score  -5.21 filed at 07/14/2025 1240   ABIMBOLA % 0.54 filed at 07/14/2025 1240          Assessment & Plan:    Neuro:  No diagnosis or significant findings on chart review or clinical presentation and evaluation.     HEENT/Airway:  No significant findings on chart review or clinical presentation and evaluation.   History of Obstructive sleep apnea-Non-compliant with CPAP. States was diagnosed as mild and did not need CPAP.   STOP-BANG Score-5 points high risk for ERICA    Mallampati::  II    TM distance::  >3 FB    Neck ROM::  Full  Dentures-reports upper/lower partials  Crowns-reports x 1  Implants-denies    Cardiovascular:  No significant findings on chart review or clinical presentation and evaluation.   History of Congestive heart failure-Managed with Entresto and Carvedilol. She is currently euvolemic with acceptable functional capacity, METS 5. She does report AVILEZ which she states is baseline. LSCTAB. She is speaking full sentences and her breathing is non-labored with a resting SPO2 of 95% RA. Last echo completed 2024 LVEF 50-55% with grade I diastolic dysfunction.   History of Coronary artery disease-Managed with Aspirin. Nonobstructive per last cardiac catheterization.   History of Aortic stenosis-Mild per last echo from 2024. Grade 1/6 SARWAT heard over RUSB  History of Hypertension-Managed with Carvedilol and Entresto. BP in PAT was 132/77.   History of Hyperlipidemia-Managed with Rosuvastatin.   METS: 5  RCRI: 1 point, 6.0% risk for postoperative MACE   ABIMBOLA: 0.54% risk for postoperative MACE  EKG -Completed 6/19/2025  Sinus rhythm  Non-specific T wave abnormality  Borderline EKG    Transthoracic echocardiogram-Completed 11/14/2024  minimal aortic stenosis w/ trileaflet valve, mild MR, mild TR, normal LV Fx (EF 50-55%),  grade 1 diastolic dysfunction    Pulmonary:  No significant findings on chart review or clinical presentation and evaluation.   History of Chronic obstructive pulmonary disease-No current medications. Quit smoking 18 years ago. LSCTAB with a resting SPO2 of 95% RA.   ARISCAT: 26-44 points, 13.3% risk of in-hospital postoperative pulmonary complication  PRODIGY: High risk for opioid induced respiratory depression  Smoking History-She quit smoking approximately 18 years ago. Previously smoked 1-2 PPD x 18 years.   Discussed smoking cessation and deep breathing handout given    Renal/Urinary:  No diagnosis or significant findings on chart review or clinical presentation and evaluation, however, the patient is at increased risk of perioperative renal complications secondary to age>/= 56, active chf, and HTN. Preventative measures include BP monitoring, medication compliance, and hydration management.   CMP-Reviewed, stable  Creatinine-0.85  GFR-79    Endocrine:  No significant findings on chart review or clinical presentation and evaluation.   History of Hypothyroidism-Managed with Levothyroxine. No clinical S/S of hyperthyroidism    LNF2I-1.2%    Hematologic/Immunology:  No significant findings on chart review or clinical presentation and evaluation.  History of Breast cancer-Currently managed with Letrozole. Diagnosed in 2022. Treated wityh a bilateral mastectomy and radiation. No reoccurrence.   The patient is not a Jehovah’s witness and will accept blood and blood products if medically indicated.   History of previous blood transfusions No  CBC-Reviewed, stable  HGB-12.6  Caprini Score 13, patient at High for postoperative DVT. Pt supplied education/VTE handout  Anticoagulation use: Yes   Aspirin-OK to DC 7 days preoperatively per prescriber. Patient is aware ands has no questions at this time.     Gastrointestinal:   No significant findings on chart review or clinical presentation and evaluation.   History of  Gastroesophageal reflux disease-Managed with Omeprazole.   Recreational drug use: none  Alcohol use none    Infectious disease:   No diagnosis or significant findings on chart review or clinical presentation and evaluation.   Prescription provided for CHG body wash and dental rinse. CHG use instructions reviewed and provided to patient.  Staph screen collected-Positive for MRSA, Currently on Doxycycline and intranasal Mupirocin.     Musculoskeletal:   No diagnosis on chart review or clinical presentation and evaluation.   Positive for Obesity-BMI 38.97  History of Rheumatoid arthritis-Managed with Rinvoq. Patient currently holding medication as prescriber instructed her to hold 2 weeks before and 2 weeks after surgery.   JHFRAT score-7 points. moderate risk for falls    Anesthesia:  ASA 3 - Patient with moderate systemic disease with functional limitations  Anticipated anesthesia-Consult  History of General anesthesia- yes  Complications- No anesthesia complications  Family history of anesthesia complications-Father: PONV    Labs & Imaging ordered:  GELY  Nickel/metal allergy-negative  Shellfish allergy-negative    Overall, patient Moderate Risk for the scheduled Moderate Risk surgery. Discussed with patient medication instructions, NPO guidelines, and any questions or concerns.     Face to face time spent 45 minutes  All resulted testing from PAT was forwarded to the surgeon and the patient's PCP if applicable.     Preoperative clearance requested from Cardiology  Reason: Cardiac history  Risk stratification: Moderate risk  Provider: Dr Leonor Cope  Recommendations: NA  Surgery: left total knee arthroplasty on 7/15/2025 with Dr. Johnson             [1]   Past Medical History:  Diagnosis Date    Anemia     Only sometimes, other times I have been told i have think blood.    Arrhythmia 5/2022    Cardiomyopathy, after 4/8/22 double mastectomy  wore life vest for 3 months.    Arthritis 2017    RA and OA per pt  report 25    Autoimmune disorder (Multi) ?    Rheumatoid arthritis    Breast cancer 2021    Found positive  removed -double mastectomy 2022    Cardiomyopathy 2022    After mastectomy, failed stress test then wore life vest 3 months.    CHF (congestive heart failure)     Cholelithiasis     Chronic headaches ?    Now I drink coffee and the headaches don't hit as hard or as often.    Chronic pain disorder ?    Gets real bad and then the barametric pressure changes then different bones and joints flare up. I am told that inflammation plays a big role in my pain.    Colon polyp     Last colonoscopy all clear     Depression 200?    The doctor put on my social security file.    Dizziness ?    Was put on dramamine and told i would have to take for the rest of my life. I stopped taking after 1 year.    Epistaxis     childhood- had nose cauterized in childhood and no further issues    Esophageal disease     Gerd    Fractures ?    Broke my little finger on my right hand when i was young.    GERD (gastroesophageal reflux disease)     Gerd  was put on omeprazole    Hyperlipidemia     Hypertension 2022    Hypothyroidism     Dr Wai Trotter put me on levothyroxin    Joint pain ?    Diagnosed with rheumatoid arthritis with Dr Forest Maya    Lumbar disc disease 200?    Deteriorating disc L3 & L4, [place], [place]    Motion sickness     [employer] off balance    Prematurity ([facility]) 1990, May 1991    2 miscarriages    Scoliosis ?    Slight case of scoliosis    Sleep apnea     Substance addiction (Multi)     Clean & Sober since 3/25/1993 (that is the date i went back to AA/NA)    Urinary tract infection    [2]   Past Surgical History:  Procedure Laterality Date     SECTION, LOW TRANSVERSE  1992    First live birth - breech baby    CHOLECYSTECTOMY  ?    COLONOSCOPY          HYSTERECTOMY  2015    MASTECTOMY  2022     UPPER GASTROINTESTINAL ENDOSCOPY     [3]   Family History  Problem Relation Name Age of Onset    Arthritis Mother Fátima Serrano     Vision loss Mother Fátima Serrano 40 - 49    Hearing loss Mother Fátima Serrano 60 - 69    Diabetes Father Roman Valenciaski     Hypertension Father Roman Mimsi     Vision loss Father Roman Valenciaski 50 - 59    Depression Father Roman Mimsi 40 - 49    Hearing loss Father Roman Valenciaski 50 - 59    Diabetes Maternal Grandmother Valery Sarah Rosado     Hypertension Maternal Grandmother Valery Sarah Rosado     Heart disease Maternal Grandmother Valery Sarah Rosado     Vision loss Maternal Grandmother Valery Sarah Rosado 20 - 29    Stroke Maternal Grandfather Melchor Rosado     Alcohol abuse Maternal Grandfather Melchor Rosado     Diabetes Paternal Grandfather Roman Valenciaskmyriam     Alcohol abuse Paternal Grandfather Roman Valenciaski 20 - 29    Cancer Mother's Sister Bev Gandara 40 - 49    Cancer Cousin Florinda Jimenez 40 - 49   [4]   Allergies  Allergen Reactions    Barium Iodide Anaphylaxis    Iodinated Contrast Media Unknown    Sulfa (Sulfonamide Antibiotics) Unknown    Atorvastatin Myalgia    Iopamidol Rash      93

## 2025-07-14 NOTE — H&P (VIEW-ONLY)
CPM/PAT Evaluation       Name: Emerson Araujo (Emerson Araujo)  /Age: 1965/60 y.o.     In-Person       Chief Complaint: Unilateral primary osteoarthritis, left knee     HPI  Patient is an alert and oriented 60 year old female scheduled for a left total knee arthroplasty on 7/15/2025 with Dr. Johnson for unilateral primary osteoarthritis, left knee. She endorses left knee pain that she currently rates at a 5/10 which worsens during ambulation and activity. She is ambulatory with a cane, current METS score of 5. PMHX includes OA, obesity, HTN, HLD, GERD, CHF, COPD, breast cancer, hypothyroidism, and RA. Presents to [facility] today for perioperative risk stratification and optimization.     Medical History[1]    Surgical History[2]    Patient  reports being sexually active and has had partner(s) who are male. She reports using the following methods of birth control/protection: Post-menopausal and Male Sterilization.    Family History[3]    Allergies[4]    Prior to Admission medications    Medication Sig Start Date End Date Taking? Authorizing Provider   aspirin 81 mg EC tablet Take 1 tablet (81 mg) by mouth once daily.    Historical Provider, MD   calcium carbonate/vitamin D3 (CALCIUM 500 + D, D3, ORAL) Take by mouth.    Historical Provider, MD   carvedilol (Coreg) 25 mg tablet Take 1 tablet (25 mg) by mouth 2 times daily (morning and late afternoon). 25   Historical Provider, MD   chlorhexidine (Hibiclens) 4 % external liquid For use on hair and body daily, beginning 4 days before surgery and including the morning of surgery for a total of 5 uses. 25   Annamarie Handy PA-C   chlorhexidine (Peridex) 0.12 % solution Gargle, swish and spit. For use the evening before surgery and the morning of surgery. Discard unused portion. 25   Annamarie Handy PA-C   cyanocobalamin (Vitamin B-12) 500 mcg tablet Take 1 tablet (500 mcg) by mouth once daily.    Historical Provider, MD   doxycycline (Monodox) 100  mg capsule Take 1 capsule (100 mg) by mouth every 12 hours. 7/9/25   Historical Provider, MD   Entresto 24-26 mg tablet Take 1 tablet by mouth 2 times daily (morning and late afternoon).    Historical Provider, MD   folic acid (Folvite) 1 mg tablet Take 1 tablet (1 mg) by mouth early in the morning.. 3/3/25   Historical Provider, MD   letrozole (Femara) 2.5 mg tablet Take 1 tablet (2.5 mg total) by mouth once daily.    Historical Provider, MD   levothyroxine (Synthroid, Levoxyl) 200 mcg tablet Take 1 tablet (200 mcg) by mouth early in the morning.. 5/31/25   Historical Provider, MD   magnesium 250 mg tablet Take 1 tablet (250 mg) by mouth once daily.    Historical Provider, MD   meloxicam (Mobic) 15 mg tablet Take 1 tablet (15 mg) by mouth early in the morning.. 5/5/25   Historical Provider, MD   multivitamin with minerals tablet Take 1 tablet by mouth once daily.    Historical Provider, MD   mupirocin (Bactroban) 2 % ointment Apply topically 2 times a day. 7/9/25   Historical Provider, MD   omeprazole (PriLOSEC) 40 mg DR capsule Take 1 capsule (40 mg) by mouth once daily.    Historical Provider, MD   polyethylene glycol (Glycolax, Miralax) 17 gram/dose powder Mix of powder and drink. For use to prevent constipation associated with narcotic pain medication use. Use daily, beginning the day after surgery and continue daily while taking narcotic pain medication. 6/13/25   Annamarie Handy PA-C   Rinvoq 15 mg tablet extended release 24 hr Take 1 tablet (15 mg) by mouth once daily.    Historical Provider, MD   rosuvastatin (Crestor) 10 mg tablet Take 1 tablet (10 mg) by mouth early in the morning.. 6/9/25   Historical Provider, MD   zinc gluconate 50 mg elemental tablet Take 1 tablet by mouth once daily.    Historical Provider, MD       Review of Systems   Constitutional: Negative for chills, decreased appetite, diaphoresis, fever and malaise/fatigue.   Eyes:  Negative for blurred vision and double vision.    Cardiovascular:  Negative for chest pain, claudication, cyanosis, irregular heartbeat, leg swelling, near-syncope and palpitations. Positive for dyspnea on exertion  Respiratory:  Negative for cough, hemoptysis, shortness of breath and wheezing.    Endocrine: Negative for cold intolerance, heat intolerance, polydipsia, polyphagia and polyuria.   Gastrointestinal:  Negative for abdominal pain, constipation, diarrhea, dysphagia, nausea and vomiting.   Genitourinary:  Negative for bladder incontinence, dysuria, hematuria, incomplete emptying, nocturia, frequency, pelvic pain and urgency.   Neurological:  Negative for headaches, light-headedness, paresthesias, sensory change and weakness.   Psychiatric/Behavioral:  Negative for altered mental status.    Musculoskeletal: Negative for myalgias. Positive for arthralgias     Vitals and nursing note reviewed.     Physical exam  Constitutional:       Appearance: Normal appearance. She is Obese.   HENT:      Head: Normocephalic and atraumatic.      Mouth/Throat:      Mouth: Mucous membranes are moist.      Pharynx: Oropharynx is clear.   Eyes:      Extraocular Movements: Extraocular movements intact.      Conjunctiva/sclera: Conjunctivae normal.      Pupils: Pupils are equal, round, and reactive to light.   Cardiovascular:      PMI at left midclavicular line. Normal rate. Regular rhythm. Normal S1. Normal S2.       Murmurs: Grade 1/6 SARWAT RUSB     No gallop.  No click. No rub.       No audible carotid bruit     No lower extremity edema on exam  Pulmonary:      Effort: Pulmonary effort is normal.      Breath sounds: Normal breath sounds.   Abdominal:      General: Abdomen is flat. Bowel sounds are normal.      Palpations: Abdomen is soft and non-tender  Musculoskeletal:      Cervical back: Normal range of motion and neck supple.      Knee, left: Limited ROM  Skin:     General: Skin is warm and dry.      Capillary Refill: Capillary refill takes less than 2 seconds.  "  Neurological:      General: No focal deficit present.      Mental Status: She is alert and oriented to person, place, and time. Mental status is at baseline.   Psychiatric:         Mood and Affect: Mood normal.         Behavior: Behavior normal.         Thought Content: Thought content normal.         Judgment: Judgment normal.     Vitals and nursing note reviewed. Physical exam within normal limits.     Visit Vitals  /77   Pulse 66   Temp 36.8 °C (98.2 °F) (Temporal)   Resp 18   Ht 1.6 m (5' 3\")   Wt 99.8 kg (220 lb 0.3 oz)   SpO2 95%   BMI 38.97 kg/m²   Smoking Status Former   BSA 2.11 m²     DASI Risk Score      Flowsheet Row Pre-Admission Testing from 7/14/2025 in [facility] Questionnaire Series Submission from 7/2/2025 in [facility] OR with Generic Provider Shannan   Can you take care of yourself (eat, dress, bathe, or use toilet)?  2.75 filed at 07/14/2025 1239 2.75  filed at 07/02/2025 1334   Can you walk indoors, such as around your house? 1.75 filed at 07/14/2025 1239 1.75  filed at 07/02/2025 1334   Can you walk a block or two on level ground?  2.75 filed at 07/14/2025 1239 0  filed at 07/02/2025 1334   Can you climb a flight of stairs or walk up a hill? 0 filed at 07/14/2025 1239 5.5  filed at 07/02/2025 1334   Can you run a short distance? 0 filed at 07/14/2025 1239 0  filed at 07/02/2025 1334   Can you do light work around the house like dusting or washing dishes? 2.7 filed at 07/14/2025 1239 2.7  filed at 07/02/2025 1334   Can you do moderate work around the house like vacuuming, sweeping floors or carrying groceries? 3.5 filed at 07/14/2025 1239 3.5  filed at 07/02/2025 1334   Can you do heavy work around the house like scrubbing floors or lifting and moving heavy furniture?  0 filed at 07/14/2025 1239 0  filed at 07/02/2025 1334   Can you do yard work like raking leaves, weeding or pushing a mower? 0 filed at 07/14/2025 1239 0  filed at 07/02/2025 1334   Can " you have sexual relations? 5.25 filed at 07/14/2025 1239 0  filed at 07/02/2025 1334   Can you participate in moderate recreational activities like golf, bowling, dancing, doubles tennis or throwing a baseball or football? 0 filed at 07/14/2025 1239 0  filed at 07/02/2025 1334   Can you participate in strenous sports like swimming, singles tennis, football, basketball, or skiing? 0 filed at 07/14/2025 1239 0  filed at 07/02/2025 1334   DASI SCORE 18.7 filed at 07/14/2025 1239 16.2  filed at 07/02/2025 1334   METS Score (Will be calculated only when all the questions are answered) 5 filed at 07/14/2025 1239 4.7  filed at 07/02/2025 1334          Caprini DVT Assessment      Flowsheet Row Pre-Admission Testing from 7/14/2025 in [facility]   DVT Score (IF A SCORE IS NOT CALCULATING, MUST SELECT A BMI TO COMPLETE) 13 filed at 07/14/2025 1238   Medical Factors Present cancer, chemotherapy, or previous malignancy, Congestive Heart Failure < 1 month, COPD filed at 07/14/2025 1238   Surgical Factors Elective major lower extremity arthroplasty filed at 07/14/2025 1238   BMI (BMI MUST BE CHOSEN) 31-40 (Obesity) filed at 07/14/2025 1238          Modified Frailty Index      Flowsheet Row Pre-Admission Testing from 7/14/2025 in [facility]   Non-independent functional status (problems with dressing, bathing, personal grooming, or cooking) 0 filed at 07/14/2025 1239   History of diabetes mellitus  0 filed at 07/14/2025 1239   History of COPD 0.0909 filed at 07/14/2025 1239   History of CHF 0.0909 filed at 07/14/2025 1239   History of MI 0 filed at 07/14/2025 1239   History of Percutaneous Coronary Intervention, Cardiac Surgery, or Angina No filed at 07/14/2025 1239   Hypertension requiring the use of medication  0.0909 filed at 07/14/2025 1239   Peripheral vascular disease 0 filed at 07/14/2025 1239   Impaired sensorium (cognitive impairement or loss, clouding, or delirium) 0 filed at 07/14/2025  1239   TIA or CVA withouy residual deficit 0 filed at 07/14/2025 1239   Cerebrovascular accident with deficit 0 filed at 07/14/2025 1239   Modified Frailty Index Calculator .2727 filed at 07/14/2025 1239          [id number] Stroke Risk Points  Current as of a minute ago        N/A 0 to 9 Points:      Last Change: N/A          The [id number] risk score (Lip YOVANNY, et al. 2009. © 2010 [employer]) quantifies the risk of stroke for a patient with atrial fibrillation. For patients without atrial fibrillation or under the age of 18 this score appears as N/A. Higher score values generally indicate higher risk of stroke.        This score is not applicable to this patient. Components are not calculated.          Revised Cardiac Risk Index      Flowsheet Row Pre-Admission Testing from 7/14/2025 in [facility]   High-Risk Surgery (Intraperitoneal, Intrathoracic,Suprainguinal vascular) 0 filed at 07/14/2025 1239   History of ischemic heart disease (History of MI, History of positive exercuse test, Current chest paint considered due to myocardial ischemia, Use of nitrate therapy, ECG with pathological Q Waves) 0 filed at 07/14/2025 1239   History of congestive heart failure (pulmonary edemia, bilateral rales or S3 gallop, Paroxysmal nocturnal dyspnea, CXR showing pulmonary vascular redistribution) 1 filed at 07/14/2025 1239   History of cerebrovascular disease (Prior TIA or stroke) 0 filed at 07/14/2025 1239   Pre-operative insulin treatment 0 filed at 07/14/2025 1239   Pre-operative creatinine>2 mg/dl 0 filed at 07/14/2025 1239   Revised Cardiac Risk Calculator 1 filed at 07/14/2025 1239          Apfel Simplified Score    No data to display       Risk Analysis Index Results This Encounter    No data found in the last 10 encounters.       Stop Bang Score      Flowsheet Row Pre-Admission Testing from 7/14/2025 in [facility] Clinical Support from 7/11/2025 in   [facility]   Do you snore loudly? 1 filed at 07/14/2025 1207 1 filed at 07/11/2025 0934   Do you often feel tired or fatigued after your sleep? 1 filed at 07/14/2025 1207 1 filed at 07/11/2025 0934   Has anyone ever observed you stop breathing in your sleep? 0 filed at 07/14/2025 1207 1 filed at 07/11/2025 0934   Do you have or are you being treated for high blood pressure? 1 filed at 07/14/2025 1207 1 filed at 07/11/2025 0934   Recent BMI (Calculated) 39 filed at 07/14/2025 1207 39 filed at 07/11/2025 0934   Is BMI greater than 35 kg/m2? 1=Yes filed at 07/14/2025 1207 1=Yes filed at 07/11/2025 0934   Age older than 50 years old? 1=Yes filed at 07/14/2025 1207 1=Yes filed at 07/11/2025 0934   Is your neck circumference greater than 17 inches (Male) or 16 inches (Female)? 0 filed at 07/14/2025 1207 --   Gender - Male 0=No filed at 07/14/2025 1207 0=No filed at 07/11/2025 0934   STOP-BANG Total Score 5 filed at 07/14/2025 1207 --          Prodigy: High Risk  Total Score: 7              Prodigy CHF score          ARISCAT Score for Postoperative Pulmonary Complications      Flowsheet Row Pre-Admission Testing from 7/14/2025 in [facility]   Age Calculated Score 3 filed at 07/14/2025 1239   Preoperative SpO2 8 filed at 07/14/2025 1239   Respiratory infection in the last month Either upper or lower (i.e., URI, bronchitis, pneumonia), with fever and antibiotic treatment 0 filed at 07/14/2025 1239   Preoperative anemia (Hgb less than 10 g/dl) 0 filed at 07/14/2025 1239   Surgical incision  0 filed at 07/14/2025 1239   Duration of surgery  16 filed at 07/14/2025 1239   Emergency Procedure  0 filed at 07/14/2025 1239   ARISCAT Total Score  27 filed at 07/14/2025 1239          Ghada Perioperative Risk for Myocardial Infarction or Cardiac Arrest (ABIMBOLA)      Flowsheet Row Pre-Admission Testing from 7/14/2025 in [facility]   Calculated Age Score 1.16 filed at 07/14/2025 1249    Functional Status  0 filed at 07/14/2025 1240   ASA Class  -1.92 filed at 07/14/2025 1240   Creatinine 0 filed at 07/14/2025 1240   Type of Procedure  0.80 filed at 07/14/2025 1240   ABIMBOLA Total Score  -5.21 filed at 07/14/2025 1240   ABIMBOLA % 0.54 filed at 07/14/2025 1240          Assessment & Plan:    Neuro:  No diagnosis or significant findings on chart review or clinical presentation and evaluation.     HEENT/Airway:  No significant findings on chart review or clinical presentation and evaluation.   History of Obstructive sleep apnea-Non-compliant with CPAP. States was diagnosed as mild and did not need CPAP.   STOP-BANG Score-5 points high risk for ERICA    Mallampati::  II    TM distance::  >3 FB    Neck ROM::  Full  Dentures-reports upper/lower partials  Crowns-reports x 1  Implants-denies    Cardiovascular:  No significant findings on chart review or clinical presentation and evaluation.   History of Congestive heart failure-Managed with Entresto and Carvedilol. She is currently euvolemic with acceptable functional capacity, METS 5. She does report AVILEZ which she states is baseline. LSCTAB. She is speaking full sentences and her breathing is non-labored with a resting SPO2 of 95% RA. Last echo completed 2024 LVEF 50-55% with grade I diastolic dysfunction.   History of Coronary artery disease-Managed with Aspirin. Nonobstructive per last cardiac catheterization.   History of Aortic stenosis-Mild per last echo from 2024. Grade 1/6 SARWAT heard over RUSB  History of Hypertension-Managed with Carvedilol and Entresto. BP in PAT was 132/77.   History of Hyperlipidemia-Managed with Rosuvastatin.   METS: 5  RCRI: 1 point, 6.0% risk for postoperative MACE   ABIMBOLA: 0.54% risk for postoperative MACE  EKG -Completed 6/19/2025  Sinus rhythm  Non-specific T wave abnormality  Borderline EKG    Transthoracic echocardiogram-Completed 11/14/2024  minimal aortic stenosis w/ trileaflet valve, mild MR, mild TR, normal LV Fx (EF 50-55%),  grade 1 diastolic dysfunction    Pulmonary:  No significant findings on chart review or clinical presentation and evaluation.   History of Chronic obstructive pulmonary disease-No current medications. Quit smoking 18 years ago. LSCTAB with a resting SPO2 of 95% RA.   ARISCAT: 26-44 points, 13.3% risk of in-hospital postoperative pulmonary complication  PRODIGY: High risk for opioid induced respiratory depression  Smoking History-She quit smoking approximately 18 years ago. Previously smoked 1-2 PPD x 18 years.   Discussed smoking cessation and deep breathing handout given    Renal/Urinary:  No diagnosis or significant findings on chart review or clinical presentation and evaluation, however, the patient is at increased risk of perioperative renal complications secondary to age>/= 56, active chf, and HTN. Preventative measures include BP monitoring, medication compliance, and hydration management.   CMP-Reviewed, stable  Creatinine-0.85  GFR-79    Endocrine:  No significant findings on chart review or clinical presentation and evaluation.   History of Hypothyroidism-Managed with Levothyroxine. No clinical S/S of hyperthyroidism    WCK4L-1.2%    Hematologic/Immunology:  No significant findings on chart review or clinical presentation and evaluation.  History of Breast cancer-Currently managed with Letrozole. Diagnosed in 2022. Treated wityh a bilateral mastectomy and radiation. No reoccurrence.   The patient is not a Jehovah’s witness and will accept blood and blood products if medically indicated.   History of previous blood transfusions No  CBC-Reviewed, stable  HGB-12.6  Caprini Score 13, patient at High for postoperative DVT. Pt supplied education/VTE handout  Anticoagulation use: Yes   Aspirin-OK to DC 7 days preoperatively per prescriber. Patient is aware ands has no questions at this time.     Gastrointestinal:   No significant findings on chart review or clinical presentation and evaluation.   History of  Gastroesophageal reflux disease-Managed with Omeprazole.   Recreational drug use: none  Alcohol use none    Infectious disease:   No diagnosis or significant findings on chart review or clinical presentation and evaluation.   Prescription provided for CHG body wash and dental rinse. CHG use instructions reviewed and provided to patient.  Staph screen collected-Positive for MRSA, Currently on Doxycycline and intranasal Mupirocin.     Musculoskeletal:   No diagnosis on chart review or clinical presentation and evaluation.   Positive for Obesity-BMI 38.97  History of Rheumatoid arthritis-Managed with Rinvoq. Patient currently holding medication as prescriber instructed her to hold 2 weeks before and 2 weeks after surgery.   JHFRAT score-7 points. moderate risk for falls    Anesthesia:  ASA 3 - Patient with moderate systemic disease with functional limitations  Anticipated anesthesia-Consult  History of General anesthesia- yes  Complications- No anesthesia complications  Family history of anesthesia complications-Father: PONV    Labs & Imaging ordered:  GELY  Nickel/metal allergy-negative  Shellfish allergy-negative    Overall, patient Moderate Risk for the scheduled Moderate Risk surgery. Discussed with patient medication instructions, NPO guidelines, and any questions or concerns.     Face to face time spent 45 minutes  All resulted testing from PAT was forwarded to the surgeon and the patient's PCP if applicable.     Preoperative clearance requested from Cardiology  Reason: Cardiac history  Risk stratification: Moderate risk  Provider: Dr Leonor Cope  Recommendations: NA  Surgery: left total knee arthroplasty on 7/15/2025 with Dr. Johnson             [1]   Past Medical History:  Diagnosis Date    Anemia     Only sometimes, other times I have been told i have think blood.    Arrhythmia 5/2022    Cardiomyopathy, after 4/8/22 double mastectomy  wore life vest for 3 months.    Arthritis 2017    RA and OA per pt  report 25    Autoimmune disorder (Multi) ?    Rheumatoid arthritis    Breast cancer 2021    Found positive  removed -double mastectomy 2022    Cardiomyopathy 2022    After mastectomy, failed stress test then wore life vest 3 months.    CHF (congestive heart failure)     Cholelithiasis     Chronic headaches ?    Now I drink coffee and the headaches don't hit as hard or as often.    Chronic pain disorder ?    Gets real bad and then the barametric pressure changes then different bones and joints flare up. I am told that inflammation plays a big role in my pain.    Colon polyp     Last colonoscopy all clear     Depression 200?    The doctor put on my social security file.    Dizziness ?    Was put on dramamine and told i would have to take for the rest of my life. I stopped taking after 1 year.    Epistaxis     childhood- had nose cauterized in childhood and no further issues    Esophageal disease     Gerd    Fractures ?    Broke my little finger on my right hand when i was young.    GERD (gastroesophageal reflux disease)     Gerd  was put on omeprazole    Hyperlipidemia     Hypertension 2022    Hypothyroidism     Dr Wai Trotter put me on levothyroxin    Joint pain ?    Diagnosed with rheumatoid arthritis with Dr Forest Maya    Lumbar disc disease 200?    Deteriorating disc L3 & L4, [place], [place]    Motion sickness     [employer] off balance    Prematurity ([facility]) 1990, May 1991    2 miscarriages    Scoliosis ?    Slight case of scoliosis    Sleep apnea     Substance addiction (Multi)     Clean & Sober since 3/25/1993 (that is the date i went back to AA/NA)    Urinary tract infection    [2]   Past Surgical History:  Procedure Laterality Date     SECTION, LOW TRANSVERSE  1992    First live birth - breech baby    CHOLECYSTECTOMY  ?    COLONOSCOPY          HYSTERECTOMY  2015    MASTECTOMY  2022     UPPER GASTROINTESTINAL ENDOSCOPY     [3]   Family History  Problem Relation Name Age of Onset    Arthritis Mother Fátima Serrano     Vision loss Mother Fátima Serrano 40 - 49    Hearing loss Mother Fátima Serrano 60 - 69    Diabetes Father Roman Valenciaski     Hypertension Father Roman Mimsi     Vision loss Father Roman Valenciaski 50 - 59    Depression Father Roman Mimsi 40 - 49    Hearing loss Father Roman Valenciaski 50 - 59    Diabetes Maternal Grandmother Valery Sarah Rosado     Hypertension Maternal Grandmother Valery Sarah Rosado     Heart disease Maternal Grandmother Valery Sarah Rosado     Vision loss Maternal Grandmother Valery Sarah Rosado 20 - 29    Stroke Maternal Grandfather Melchor Rosado     Alcohol abuse Maternal Grandfather Melchor Rosado     Diabetes Paternal Grandfather Roman Valenciaskmyriam     Alcohol abuse Paternal Grandfather Roman Valenciaski 20 - 29    Cancer Mother's Sister Bev Gandara 40 - 49    Cancer Cousin Florinda Jimenez 40 - 49   [4]   Allergies  Allergen Reactions    Barium Iodide Anaphylaxis    Iodinated Contrast Media Unknown    Sulfa (Sulfonamide Antibiotics) Unknown    Atorvastatin Myalgia    Iopamidol Rash

## 2025-07-14 NOTE — PROGRESS NOTES
Patient is a 60-year-old female presents today for discussion of upcoming left total knee arthroplasty.  She was referred to the [facility] program.  She is failed a greater than 3-month trial of reasonable conservative treatment and wishes to proceed with surgery which is indicated at this time.    Left knee:  AAOx3, NAD, walks with a moderate antalgic gait  Varus allignment  Range of motion lacks 10 degrees of full extension and flexes to 110 degrees  Stable to varus/valgus/anterior/posterior stress through out the range of motion  Slight laxity with varus stress  Diffuse medial  joint line tenderness to palpation  Moderate effusion  SILT in a yony/saph/per/tib distribution  5/5 knee extension/df/pf/ehl  ½ dorsalis pedis and posterior tibial pulse  no popliteal lymphadenopathy  no other overlying lesions  mood: euthymic  Respirations non labored    Plain films were reviewed by myself in clinic today.  They have advanced osteoarthritis of their knee with significant joint space narrowing, bone on bone changes, underlying sclerosis and tricompartmental osteophytic change.    Patient is now failed a greater than 3-month trial of reasonable conservative treatment and wishes proceed with surgery which is indicated at this time.  Discussed the risk benefits alternatives to surgery.  We specifically discussed increased risk of wound healing problems infection and component failure secondary to patient's obesity.  All of their questions were answered.    Procedure: left total knee  [place] [facility] [place]: M17.12  CPT: 40074  Stay: overnight  Equipment: [employer] Primary    I talked with the patient at length about risks, limitations, benefits and alternatives to total knee replacement today. I reviewed concerns about implant wear, loosening, breakage, infection and infection prophylaxis, DVT, PE, death and other medical and anesthetic complications of surgery. We talked about the potential for persistent  pain following surgery since there are many possible causes for knee and leg pain. The patient was advised that knee replacement will only relieve pain that is coming from the knee. We talked about limited range of motion following knee replacement and the importance of physical therapy and their motivation. We talked about improvements in pain management post-operatively and our accelerated rehab program. We talked about wound healing issues and neurovascular complications of surgery. I reviewed functional and activity restrictions in detail. We discussed the fact that many of our patients are able to go home in 1 day or less depending on their health, mobility, pre-op preparation, individual home situation and personal preference.  The patient has identified their personal goals of their joint replacement surgery and recovery and we have discussed them. These are documented in our [employer] patient engagement platform. In addition, we have discussed the advantages and disadvantages of various implant and fixation options, as well as various surgical approaches.  The basic concepts of the joint replacement procedure has been reviewed with the patient and the patient has been provided the opportunity to see an actual implant either in the office or in our pre-op education class.  All of the patients questions were answered. The patient can call my office to schedule surgery and the pre-op teaching class. I told the patient that they should contact their primary care physician to discuss fitness for surgery.    This note was created using voice recognition software and was not corrected for typographical or grammatical errors.

## 2025-07-15 ENCOUNTER — HOSPITAL ENCOUNTER (OUTPATIENT)
Facility: HOSPITAL | Age: 60
Discharge: HOME HEALTH | End: 2025-07-16
Attending: ORTHOPAEDIC SURGERY | Admitting: ORTHOPAEDIC SURGERY
Payer: COMMERCIAL

## 2025-07-15 ENCOUNTER — PHARMACY VISIT (OUTPATIENT)
Dept: PHARMACY | Facility: CLINIC | Age: 60
End: 2025-07-15
Payer: COMMERCIAL

## 2025-07-15 ENCOUNTER — ANESTHESIA (OUTPATIENT)
Dept: OPERATING ROOM | Facility: HOSPITAL | Age: 60
End: 2025-07-15
Payer: COMMERCIAL

## 2025-07-15 ENCOUNTER — HOME HEALTH ADMISSION (OUTPATIENT)
Dept: HOME HEALTH SERVICES | Facility: HOME HEALTH | Age: 60
End: 2025-07-15
Payer: COMMERCIAL

## 2025-07-15 ENCOUNTER — DOCUMENTATION (OUTPATIENT)
Dept: HOME HEALTH SERVICES | Facility: HOME HEALTH | Age: 60
End: 2025-07-15

## 2025-07-15 ENCOUNTER — ANESTHESIA EVENT (OUTPATIENT)
Dept: OPERATING ROOM | Facility: HOSPITAL | Age: 60
End: 2025-07-15
Payer: COMMERCIAL

## 2025-07-15 DIAGNOSIS — M17.12 UNILATERAL PRIMARY OSTEOARTHRITIS, LEFT KNEE: Primary | ICD-10-CM

## 2025-07-15 PROCEDURE — 97162 PT EVAL MOD COMPLEX 30 MIN: CPT | Mod: GP

## 2025-07-15 PROCEDURE — 2720000007 HC OR 272 NO HCPCS: Performed by: ORTHOPAEDIC SURGERY

## 2025-07-15 PROCEDURE — 99221 1ST HOSP IP/OBS SF/LOW 40: CPT | Performed by: STUDENT IN AN ORGANIZED HEALTH CARE EDUCATION/TRAINING PROGRAM

## 2025-07-15 PROCEDURE — 2500000005 HC RX 250 GENERAL PHARMACY W/O HCPCS: Performed by: NURSE ANESTHETIST, CERTIFIED REGISTERED

## 2025-07-15 PROCEDURE — 7100000011 HC EXTENDED STAY RECOVERY HOURLY - NURSING UNIT

## 2025-07-15 PROCEDURE — 7100000001 HC RECOVERY ROOM TIME - INITIAL BASE CHARGE: Performed by: ORTHOPAEDIC SURGERY

## 2025-07-15 PROCEDURE — RXMED WILLOW AMBULATORY MEDICATION CHARGE

## 2025-07-15 PROCEDURE — 2500000004 HC RX 250 GENERAL PHARMACY W/ HCPCS (ALT 636 FOR OP/ED): Performed by: ANESTHESIOLOGY

## 2025-07-15 PROCEDURE — A27447 PR TOTAL KNEE ARTHROPLASTY: Performed by: ANESTHESIOLOGY

## 2025-07-15 PROCEDURE — 3600000005 HC OR TIME - INITIAL BASE CHARGE - PROCEDURE LEVEL FIVE: Performed by: ORTHOPAEDIC SURGERY

## 2025-07-15 PROCEDURE — 2780000003 HC OR 278 NO HCPCS: Performed by: ORTHOPAEDIC SURGERY

## 2025-07-15 PROCEDURE — 2500000001 HC RX 250 WO HCPCS SELF ADMINISTERED DRUGS (ALT 637 FOR MEDICARE OP): Performed by: PHYSICIAN ASSISTANT

## 2025-07-15 PROCEDURE — 27447 TOTAL KNEE ARTHROPLASTY: CPT | Performed by: ORTHOPAEDIC SURGERY

## 2025-07-15 PROCEDURE — 2500000004 HC RX 250 GENERAL PHARMACY W/ HCPCS (ALT 636 FOR OP/ED): Performed by: NURSE ANESTHETIST, CERTIFIED REGISTERED

## 2025-07-15 PROCEDURE — 7100000002 HC RECOVERY ROOM TIME - EACH INCREMENTAL 1 MINUTE: Performed by: ORTHOPAEDIC SURGERY

## 2025-07-15 PROCEDURE — A4649 SURGICAL SUPPLIES: HCPCS | Performed by: ORTHOPAEDIC SURGERY

## 2025-07-15 PROCEDURE — A27447 PR TOTAL KNEE ARTHROPLASTY: Performed by: NURSE ANESTHETIST, CERTIFIED REGISTERED

## 2025-07-15 PROCEDURE — 2500000004 HC RX 250 GENERAL PHARMACY W/ HCPCS (ALT 636 FOR OP/ED): Performed by: ORTHOPAEDIC SURGERY

## 2025-07-15 PROCEDURE — C1776 JOINT DEVICE (IMPLANTABLE): HCPCS | Performed by: ORTHOPAEDIC SURGERY

## 2025-07-15 PROCEDURE — 3700000001 HC GENERAL ANESTHESIA TIME - INITIAL BASE CHARGE: Performed by: ORTHOPAEDIC SURGERY

## 2025-07-15 PROCEDURE — C1713 ANCHOR/SCREW BN/BN,TIS/BN: HCPCS | Performed by: ORTHOPAEDIC SURGERY

## 2025-07-15 PROCEDURE — 2500000005 HC RX 250 GENERAL PHARMACY W/O HCPCS: Performed by: PHYSICIAN ASSISTANT

## 2025-07-15 PROCEDURE — 3700000002 HC GENERAL ANESTHESIA TIME - EACH INCREMENTAL 1 MINUTE: Performed by: ORTHOPAEDIC SURGERY

## 2025-07-15 PROCEDURE — 97110 THERAPEUTIC EXERCISES: CPT | Mod: GP

## 2025-07-15 PROCEDURE — 2500000004 HC RX 250 GENERAL PHARMACY W/ HCPCS (ALT 636 FOR OP/ED): Performed by: PHYSICIAN ASSISTANT

## 2025-07-15 PROCEDURE — 3600000010 HC OR TIME - EACH INCREMENTAL 1 MINUTE - PROCEDURE LEVEL FIVE: Performed by: ORTHOPAEDIC SURGERY

## 2025-07-15 PROCEDURE — 27447 TOTAL KNEE ARTHROPLASTY: CPT | Performed by: PHYSICIAN ASSISTANT

## 2025-07-15 PROCEDURE — 2500000004 HC RX 250 GENERAL PHARMACY W/ HCPCS (ALT 636 FOR OP/ED): Mod: JZ | Performed by: PHYSICIAN ASSISTANT

## 2025-07-15 DEVICE — TIBAL BASE ATTUNE FB, SZ 5 CEM: Type: IMPLANTABLE DEVICE | Site: KNEE | Status: FUNCTIONAL

## 2025-07-15 DEVICE — INSERT, ATTUNE PS FB, SZ 6, 6MM: Type: IMPLANTABLE DEVICE | Site: KNEE | Status: FUNCTIONAL

## 2025-07-15 DEVICE — FEMORAL, ATTUNE PS, CEM, NRW, SZ 6, LT: Type: IMPLANTABLE DEVICE | Site: KNEE | Status: FUNCTIONAL

## 2025-07-15 DEVICE — DOME, PATELLA, MEDIALIZED, 35MM: Type: IMPLANTABLE DEVICE | Site: KNEE | Status: FUNCTIONAL

## 2025-07-15 DEVICE — BONE CEMENT, SMART SET, HIGH VISCOSITY, 40GM: Type: IMPLANTABLE DEVICE | Site: KNEE | Status: FUNCTIONAL

## 2025-07-15 RX ORDER — SODIUM CHLORIDE, SODIUM LACTATE, POTASSIUM CHLORIDE, CALCIUM CHLORIDE 600; 310; 30; 20 MG/100ML; MG/100ML; MG/100ML; MG/100ML
50 INJECTION, SOLUTION INTRAVENOUS CONTINUOUS
Status: DISCONTINUED | OUTPATIENT
Start: 2025-07-15 | End: 2025-07-16 | Stop reason: HOSPADM

## 2025-07-15 RX ORDER — ONDANSETRON 4 MG/1
4 TABLET, ORALLY DISINTEGRATING ORAL EVERY 8 HOURS PRN
Status: DISCONTINUED | OUTPATIENT
Start: 2025-07-15 | End: 2025-07-16 | Stop reason: HOSPADM

## 2025-07-15 RX ORDER — FENTANYL CITRATE 50 UG/ML
50 INJECTION, SOLUTION INTRAMUSCULAR; INTRAVENOUS EVERY 5 MIN PRN
Status: DISCONTINUED | OUTPATIENT
Start: 2025-07-15 | End: 2025-07-15 | Stop reason: HOSPADM

## 2025-07-15 RX ORDER — OXYCODONE HYDROCHLORIDE 5 MG/1
5 TABLET ORAL EVERY 6 HOURS PRN
Status: DISCONTINUED | OUTPATIENT
Start: 2025-07-15 | End: 2025-07-16 | Stop reason: HOSPADM

## 2025-07-15 RX ORDER — FAMOTIDINE 10 MG/ML
20 INJECTION, SOLUTION INTRAVENOUS ONCE
Status: COMPLETED | OUTPATIENT
Start: 2025-07-15 | End: 2025-07-15

## 2025-07-15 RX ORDER — OXYCODONE HYDROCHLORIDE 5 MG/1
5 TABLET ORAL EVERY 6 HOURS PRN
Qty: 28 TABLET | Refills: 0 | Status: SHIPPED | OUTPATIENT
Start: 2025-07-15 | End: 2025-07-18 | Stop reason: SDUPTHER

## 2025-07-15 RX ORDER — LETROZOLE 2.5 MG/1
2.5 TABLET, FILM COATED ORAL DAILY
Status: DISCONTINUED | OUTPATIENT
Start: 2025-07-15 | End: 2025-07-16 | Stop reason: HOSPADM

## 2025-07-15 RX ORDER — LEVOTHYROXINE SODIUM 100 UG/1
200 TABLET ORAL
Status: DISCONTINUED | OUTPATIENT
Start: 2025-07-16 | End: 2025-07-16 | Stop reason: HOSPADM

## 2025-07-15 RX ORDER — SCOPOLAMINE 1 MG/3D
1 PATCH, EXTENDED RELEASE TRANSDERMAL
Status: DISCONTINUED | OUTPATIENT
Start: 2025-07-15 | End: 2025-07-16 | Stop reason: HOSPADM

## 2025-07-15 RX ORDER — TALC
3 POWDER (GRAM) TOPICAL NIGHTLY PRN
Status: DISCONTINUED | OUTPATIENT
Start: 2025-07-15 | End: 2025-07-16 | Stop reason: HOSPADM

## 2025-07-15 RX ORDER — METOCLOPRAMIDE HYDROCHLORIDE 5 MG/ML
10 INJECTION INTRAMUSCULAR; INTRAVENOUS EVERY 6 HOURS PRN
Status: DISCONTINUED | OUTPATIENT
Start: 2025-07-15 | End: 2025-07-16 | Stop reason: HOSPADM

## 2025-07-15 RX ORDER — TRAMADOL HYDROCHLORIDE 50 MG/1
50 TABLET, FILM COATED ORAL EVERY 6 HOURS PRN
Qty: 28 TABLET | Refills: 0 | Status: SHIPPED | OUTPATIENT
Start: 2025-07-15 | End: 2025-07-18 | Stop reason: SDUPTHER

## 2025-07-15 RX ORDER — ACETAMINOPHEN 500 MG
1000 TABLET ORAL EVERY 6 HOURS PRN
Qty: 240 TABLET | Refills: 0 | Status: SHIPPED | OUTPATIENT
Start: 2025-07-15 | End: 2025-08-14

## 2025-07-15 RX ORDER — OXYCODONE HCL 10 MG/1
10 TABLET, FILM COATED, EXTENDED RELEASE ORAL ONCE
Status: COMPLETED | OUTPATIENT
Start: 2025-07-15 | End: 2025-07-15

## 2025-07-15 RX ORDER — FENTANYL CITRATE 50 UG/ML
50 INJECTION, SOLUTION INTRAMUSCULAR; INTRAVENOUS ONCE
Status: COMPLETED | OUTPATIENT
Start: 2025-07-15 | End: 2025-07-15

## 2025-07-15 RX ORDER — ALBUTEROL SULFATE 0.83 MG/ML
2.5 SOLUTION RESPIRATORY (INHALATION) ONCE AS NEEDED
Status: DISCONTINUED | OUTPATIENT
Start: 2025-07-15 | End: 2025-07-15 | Stop reason: HOSPADM

## 2025-07-15 RX ORDER — TRANEXAMIC ACID 1 G/10ML
INJECTION, SOLUTION INTRAVENOUS AS NEEDED
Status: DISCONTINUED | OUTPATIENT
Start: 2025-07-15 | End: 2025-07-15

## 2025-07-15 RX ORDER — METOCLOPRAMIDE 10 MG/1
10 TABLET ORAL EVERY 6 HOURS PRN
Status: DISCONTINUED | OUTPATIENT
Start: 2025-07-15 | End: 2025-07-16 | Stop reason: HOSPADM

## 2025-07-15 RX ORDER — CEFAZOLIN SODIUM 2 G/100ML
2 INJECTION, SOLUTION INTRAVENOUS EVERY 8 HOURS
Status: COMPLETED | OUTPATIENT
Start: 2025-07-15 | End: 2025-07-16

## 2025-07-15 RX ORDER — ACETAMINOPHEN 325 MG/1
650 TABLET ORAL EVERY 6 HOURS SCHEDULED
Status: DISCONTINUED | OUTPATIENT
Start: 2025-07-15 | End: 2025-07-16 | Stop reason: HOSPADM

## 2025-07-15 RX ORDER — ROPIVACAINE HYDROCHLORIDE 5 MG/ML
INJECTION, SOLUTION EPIDURAL; INFILTRATION; PERINEURAL
Status: COMPLETED | OUTPATIENT
Start: 2025-07-15 | End: 2025-07-15

## 2025-07-15 RX ORDER — NALOXONE HYDROCHLORIDE 0.4 MG/ML
0.2 INJECTION, SOLUTION INTRAMUSCULAR; INTRAVENOUS; SUBCUTANEOUS EVERY 5 MIN PRN
Status: DISCONTINUED | OUTPATIENT
Start: 2025-07-15 | End: 2025-07-16 | Stop reason: HOSPADM

## 2025-07-15 RX ORDER — BUPIVACAINE HYDROCHLORIDE 7.5 MG/ML
INJECTION, SOLUTION EPIDURAL; RETROBULBAR AS NEEDED
Status: DISCONTINUED | OUTPATIENT
Start: 2025-07-15 | End: 2025-07-15

## 2025-07-15 RX ORDER — LABETALOL HYDROCHLORIDE 5 MG/ML
5 INJECTION, SOLUTION INTRAVENOUS ONCE AS NEEDED
Status: DISCONTINUED | OUTPATIENT
Start: 2025-07-15 | End: 2025-07-15 | Stop reason: HOSPADM

## 2025-07-15 RX ORDER — ACETAMINOPHEN 325 MG/1
975 TABLET ORAL ONCE
Status: COMPLETED | OUTPATIENT
Start: 2025-07-15 | End: 2025-07-15

## 2025-07-15 RX ORDER — CEFAZOLIN SODIUM 2 G/100ML
2 INJECTION, SOLUTION INTRAVENOUS ONCE
Status: COMPLETED | OUTPATIENT
Start: 2025-07-15 | End: 2025-07-15

## 2025-07-15 RX ORDER — ROPIVACAINE/EPI/CLONIDINE/KET 2.46-0.005
SYRINGE (ML) INJECTION AS NEEDED
Status: DISCONTINUED | OUTPATIENT
Start: 2025-07-15 | End: 2025-07-15 | Stop reason: HOSPADM

## 2025-07-15 RX ORDER — KETOROLAC TROMETHAMINE 15 MG/ML
15 INJECTION, SOLUTION INTRAMUSCULAR; INTRAVENOUS EVERY 6 HOURS
Status: DISCONTINUED | OUTPATIENT
Start: 2025-07-15 | End: 2025-07-16 | Stop reason: HOSPADM

## 2025-07-15 RX ORDER — DOCUSATE SODIUM 100 MG/1
100 CAPSULE, LIQUID FILLED ORAL 2 TIMES DAILY
Status: DISCONTINUED | OUTPATIENT
Start: 2025-07-15 | End: 2025-07-16 | Stop reason: HOSPADM

## 2025-07-15 RX ORDER — PREGABALIN 75 MG/1
75 CAPSULE ORAL ONCE
Status: COMPLETED | OUTPATIENT
Start: 2025-07-15 | End: 2025-07-15

## 2025-07-15 RX ORDER — MEPERIDINE HYDROCHLORIDE 25 MG/ML
12.5 INJECTION INTRAMUSCULAR; INTRAVENOUS; SUBCUTANEOUS EVERY 10 MIN PRN
Status: DISCONTINUED | OUTPATIENT
Start: 2025-07-15 | End: 2025-07-15 | Stop reason: HOSPADM

## 2025-07-15 RX ORDER — BISACODYL 5 MG
10 TABLET, DELAYED RELEASE (ENTERIC COATED) ORAL DAILY PRN
Status: DISCONTINUED | OUTPATIENT
Start: 2025-07-15 | End: 2025-07-16 | Stop reason: HOSPADM

## 2025-07-15 RX ORDER — POLYETHYLENE GLYCOL 3350 17 G/17G
17 POWDER, FOR SOLUTION ORAL DAILY
Qty: 238 G | Refills: 0 | Status: SHIPPED | OUTPATIENT
Start: 2025-07-15

## 2025-07-15 RX ORDER — HYDRALAZINE HYDROCHLORIDE 20 MG/ML
5 INJECTION INTRAMUSCULAR; INTRAVENOUS EVERY 30 MIN PRN
Status: DISCONTINUED | OUTPATIENT
Start: 2025-07-15 | End: 2025-07-15 | Stop reason: HOSPADM

## 2025-07-15 RX ORDER — DIPHENHYDRAMINE HYDROCHLORIDE 50 MG/ML
25 INJECTION, SOLUTION INTRAMUSCULAR; INTRAVENOUS ONCE
Status: COMPLETED | OUTPATIENT
Start: 2025-07-15 | End: 2025-07-15

## 2025-07-15 RX ORDER — KETOROLAC TROMETHAMINE 30 MG/ML
INJECTION, SOLUTION INTRAMUSCULAR; INTRAVENOUS AS NEEDED
Status: DISCONTINUED | OUTPATIENT
Start: 2025-07-15 | End: 2025-07-15

## 2025-07-15 RX ORDER — DOCUSATE SODIUM 100 MG/1
100 CAPSULE, LIQUID FILLED ORAL 2 TIMES DAILY
Qty: 60 CAPSULE | Refills: 0 | Status: SHIPPED | OUTPATIENT
Start: 2025-07-15 | End: 2025-08-14

## 2025-07-15 RX ORDER — MELOXICAM 7.5 MG/1
7.5 TABLET ORAL ONCE
Status: COMPLETED | OUTPATIENT
Start: 2025-07-15 | End: 2025-07-15

## 2025-07-15 RX ORDER — OXYCODONE HYDROCHLORIDE 5 MG/1
10 TABLET ORAL EVERY 4 HOURS PRN
Status: DISCONTINUED | OUTPATIENT
Start: 2025-07-15 | End: 2025-07-16 | Stop reason: HOSPADM

## 2025-07-15 RX ORDER — SODIUM CHLORIDE, SODIUM LACTATE, POTASSIUM CHLORIDE, CALCIUM CHLORIDE 600; 310; 30; 20 MG/100ML; MG/100ML; MG/100ML; MG/100ML
75 INJECTION, SOLUTION INTRAVENOUS CONTINUOUS
Status: ACTIVE | OUTPATIENT
Start: 2025-07-15 | End: 2025-07-16

## 2025-07-15 RX ORDER — SODIUM CHLORIDE, SODIUM LACTATE, POTASSIUM CHLORIDE, CALCIUM CHLORIDE 600; 310; 30; 20 MG/100ML; MG/100ML; MG/100ML; MG/100ML
100 INJECTION, SOLUTION INTRAVENOUS CONTINUOUS
Status: DISCONTINUED | OUTPATIENT
Start: 2025-07-15 | End: 2025-07-15 | Stop reason: HOSPADM

## 2025-07-15 RX ORDER — CARVEDILOL 12.5 MG/1
25 TABLET ORAL
Status: DISCONTINUED | OUTPATIENT
Start: 2025-07-15 | End: 2025-07-16 | Stop reason: HOSPADM

## 2025-07-15 RX ORDER — PANTOPRAZOLE SODIUM 40 MG/1
40 TABLET, DELAYED RELEASE ORAL
Status: DISCONTINUED | OUTPATIENT
Start: 2025-07-16 | End: 2025-07-16 | Stop reason: HOSPADM

## 2025-07-15 RX ORDER — POLYETHYLENE GLYCOL 3350 17 G/17G
17 POWDER, FOR SOLUTION ORAL DAILY
Status: DISCONTINUED | OUTPATIENT
Start: 2025-07-15 | End: 2025-07-16 | Stop reason: HOSPADM

## 2025-07-15 RX ORDER — ROSUVASTATIN CALCIUM 10 MG/1
10 TABLET, COATED ORAL
Status: DISCONTINUED | OUTPATIENT
Start: 2025-07-15 | End: 2025-07-16 | Stop reason: HOSPADM

## 2025-07-15 RX ORDER — MIDAZOLAM HYDROCHLORIDE 1 MG/ML
2 INJECTION, SOLUTION INTRAMUSCULAR; INTRAVENOUS ONCE
Status: COMPLETED | OUTPATIENT
Start: 2025-07-15 | End: 2025-07-15

## 2025-07-15 RX ORDER — PANTOPRAZOLE SODIUM 40 MG/1
40 TABLET, DELAYED RELEASE ORAL DAILY
Qty: 30 TABLET | Refills: 0 | Status: SHIPPED | OUTPATIENT
Start: 2025-07-15 | End: 2025-08-14

## 2025-07-15 RX ORDER — SIMETHICONE 80 MG
80 TABLET,CHEWABLE ORAL 4 TIMES DAILY PRN
Status: DISCONTINUED | OUTPATIENT
Start: 2025-07-15 | End: 2025-07-16 | Stop reason: HOSPADM

## 2025-07-15 RX ORDER — PROPOFOL 10 MG/ML
INJECTION, EMULSION INTRAVENOUS AS NEEDED
Status: DISCONTINUED | OUTPATIENT
Start: 2025-07-15 | End: 2025-07-15

## 2025-07-15 RX ORDER — ONDANSETRON HYDROCHLORIDE 2 MG/ML
4 INJECTION, SOLUTION INTRAVENOUS EVERY 8 HOURS PRN
Status: DISCONTINUED | OUTPATIENT
Start: 2025-07-15 | End: 2025-07-16 | Stop reason: HOSPADM

## 2025-07-15 RX ORDER — ONDANSETRON HYDROCHLORIDE 2 MG/ML
INJECTION, SOLUTION INTRAVENOUS AS NEEDED
Status: DISCONTINUED | OUTPATIENT
Start: 2025-07-15 | End: 2025-07-15

## 2025-07-15 RX ORDER — ONDANSETRON HYDROCHLORIDE 2 MG/ML
4 INJECTION, SOLUTION INTRAVENOUS ONCE AS NEEDED
Status: DISCONTINUED | OUTPATIENT
Start: 2025-07-15 | End: 2025-07-15 | Stop reason: HOSPADM

## 2025-07-15 RX ORDER — CYCLOBENZAPRINE HCL 10 MG
5 TABLET ORAL 3 TIMES DAILY PRN
Status: DISCONTINUED | OUTPATIENT
Start: 2025-07-15 | End: 2025-07-16 | Stop reason: HOSPADM

## 2025-07-15 RX ORDER — VANCOMYCIN 2 G/400ML
2000 INJECTION, SOLUTION INTRAVENOUS ONCE
Status: COMPLETED | OUTPATIENT
Start: 2025-07-15 | End: 2025-07-15

## 2025-07-15 RX ORDER — BISACODYL 10 MG/1
10 SUPPOSITORY RECTAL DAILY PRN
Status: DISCONTINUED | OUTPATIENT
Start: 2025-07-15 | End: 2025-07-16 | Stop reason: HOSPADM

## 2025-07-15 RX ADMIN — SCOPOLAMINE 1 PATCH: 1.5 PATCH, EXTENDED RELEASE TRANSDERMAL at 09:17

## 2025-07-15 RX ADMIN — MELOXICAM 7.5 MG: 7.5 TABLET ORAL at 09:17

## 2025-07-15 RX ADMIN — ONDANSETRON 4 MG: 2 INJECTION, SOLUTION INTRAMUSCULAR; INTRAVENOUS at 10:55

## 2025-07-15 RX ADMIN — BUPIVACAINE HYDROCHLORIDE 1.4 ML: 7.5 INJECTION, SOLUTION EPIDURAL; RETROBULBAR at 10:55

## 2025-07-15 RX ADMIN — ACETAMINOPHEN 975 MG: 325 TABLET ORAL at 09:16

## 2025-07-15 RX ADMIN — KETOROLAC TROMETHAMINE 15 MG: 15 INJECTION, SOLUTION INTRAMUSCULAR; INTRAVENOUS at 23:43

## 2025-07-15 RX ADMIN — ROPIVACAINE HYDROCHLORIDE 20 ML: 5 INJECTION, SOLUTION EPIDURAL; INFILTRATION; PERINEURAL at 10:08

## 2025-07-15 RX ADMIN — ACETAMINOPHEN 650 MG: 325 TABLET ORAL at 17:45

## 2025-07-15 RX ADMIN — KETOROLAC TROMETHAMINE 15 MG: 15 INJECTION, SOLUTION INTRAMUSCULAR; INTRAVENOUS at 17:43

## 2025-07-15 RX ADMIN — DEXAMETHASONE SODIUM PHOSPHATE 4 MG: 4 INJECTION, SOLUTION INTRAMUSCULAR; INTRAVENOUS at 10:55

## 2025-07-15 RX ADMIN — DOCUSATE SODIUM 100 MG: 100 CAPSULE, LIQUID FILLED ORAL at 20:22

## 2025-07-15 RX ADMIN — FENTANYL CITRATE 50 MCG: 50 INJECTION INTRAMUSCULAR; INTRAVENOUS at 10:02

## 2025-07-15 RX ADMIN — MIDAZOLAM 2 MG: 1 INJECTION INTRAMUSCULAR; INTRAVENOUS at 10:02

## 2025-07-15 RX ADMIN — CEFAZOLIN SODIUM 2 G: 2 INJECTION, SOLUTION INTRAVENOUS at 17:45

## 2025-07-15 RX ADMIN — CARVEDILOL 25 MG: 12.5 TABLET, FILM COATED ORAL at 17:44

## 2025-07-15 RX ADMIN — PREGABALIN 75 MG: 75 CAPSULE ORAL at 09:17

## 2025-07-15 RX ADMIN — KETOROLAC TROMETHAMINE 30 MG: 30 INJECTION, SOLUTION INTRAMUSCULAR; INTRAVENOUS at 11:28

## 2025-07-15 RX ADMIN — OXYCODONE HYDROCHLORIDE 10 MG: 10 TABLET, FILM COATED, EXTENDED RELEASE ORAL at 09:17

## 2025-07-15 RX ADMIN — FAMOTIDINE 20 MG: 10 INJECTION, SOLUTION INTRAVENOUS at 09:59

## 2025-07-15 RX ADMIN — POVIDONE-IODINE 1 APPLICATION: 5 SOLUTION TOPICAL at 09:14

## 2025-07-15 RX ADMIN — TRANEXAMIC ACID 1000 MG: 100 INJECTION, SOLUTION INTRAVENOUS at 11:28

## 2025-07-15 RX ADMIN — VANCOMYCIN 2000 MG: 2 INJECTION, SOLUTION INTRAVENOUS at 09:15

## 2025-07-15 RX ADMIN — DIPHENHYDRAMINE HYDROCHLORIDE 25 MG: 50 INJECTION, SOLUTION INTRAMUSCULAR; INTRAVENOUS at 09:59

## 2025-07-15 RX ADMIN — DEXAMETHASONE SODIUM PHOSPHATE 5 MG: 4 INJECTION, SOLUTION INTRAMUSCULAR; INTRAVENOUS at 10:08

## 2025-07-15 RX ADMIN — ACETAMINOPHEN 650 MG: 325 TABLET ORAL at 23:43

## 2025-07-15 RX ADMIN — TRANEXAMIC ACID 1000 MG: 100 INJECTION, SOLUTION INTRAVENOUS at 10:54

## 2025-07-15 RX ADMIN — PROPOFOL 1000 MG: 10 INJECTION, EMULSION INTRAVENOUS at 10:55

## 2025-07-15 RX ADMIN — SODIUM CHLORIDE, SODIUM LACTATE, POTASSIUM CHLORIDE, AND CALCIUM CHLORIDE 75 ML/HR: 600; 310; 30; 20 INJECTION, SOLUTION INTRAVENOUS at 09:21

## 2025-07-15 RX ADMIN — SODIUM CHLORIDE, SODIUM LACTATE, POTASSIUM CHLORIDE, AND CALCIUM CHLORIDE 50 ML/HR: 600; 310; 30; 20 INJECTION, SOLUTION INTRAVENOUS at 17:44

## 2025-07-15 RX ADMIN — POLYETHYLENE GLYCOL 3350 17 G: 17 POWDER, FOR SOLUTION ORAL at 20:22

## 2025-07-15 RX ADMIN — CEFAZOLIN SODIUM 2 G: 2 INJECTION, SOLUTION INTRAVENOUS at 10:54

## 2025-07-15 SDOH — ECONOMIC STABILITY: INCOME INSECURITY: IN THE PAST 12 MONTHS HAS THE ELECTRIC, GAS, OIL, OR WATER COMPANY THREATENED TO SHUT OFF SERVICES IN YOUR HOME?: NO

## 2025-07-15 SDOH — HEALTH STABILITY: MENTAL HEALTH: HOW MANY DRINKS CONTAINING ALCOHOL DO YOU HAVE ON A TYPICAL DAY WHEN YOU ARE DRINKING?: PATIENT DOES NOT DRINK

## 2025-07-15 SDOH — SOCIAL STABILITY: SOCIAL INSECURITY
WITHIN THE LAST YEAR, HAVE YOU BEEN KICKED, HIT, SLAPPED, OR OTHERWISE PHYSICALLY HURT BY YOUR PARTNER OR EX-PARTNER?: NO

## 2025-07-15 SDOH — HEALTH STABILITY: MENTAL HEALTH: HOW OFTEN DO YOU HAVE SIX OR MORE DRINKS ON ONE OCCASION?: NEVER

## 2025-07-15 SDOH — SOCIAL STABILITY: SOCIAL INSECURITY: DO YOU FEEL ANYONE HAS EXPLOITED OR TAKEN ADVANTAGE OF YOU FINANCIALLY OR OF YOUR PERSONAL PROPERTY?: NO

## 2025-07-15 SDOH — SOCIAL STABILITY: SOCIAL INSECURITY: WITHIN THE LAST YEAR, HAVE YOU BEEN AFRAID OF YOUR PARTNER OR EX-PARTNER?: NO

## 2025-07-15 SDOH — ECONOMIC STABILITY: FOOD INSECURITY: WITHIN THE PAST 12 MONTHS, YOU WORRIED THAT YOUR FOOD WOULD RUN OUT BEFORE YOU GOT THE MONEY TO BUY MORE.: NEVER TRUE

## 2025-07-15 SDOH — SOCIAL STABILITY: SOCIAL INSECURITY: WITHIN THE LAST YEAR, HAVE YOU BEEN HUMILIATED OR EMOTIONALLY ABUSED IN OTHER WAYS BY YOUR PARTNER OR EX-PARTNER?: NO

## 2025-07-15 SDOH — ECONOMIC STABILITY: FOOD INSECURITY: WITHIN THE PAST 12 MONTHS, THE FOOD YOU BOUGHT JUST DIDN'T LAST AND YOU DIDN'T HAVE MONEY TO GET MORE.: NEVER TRUE

## 2025-07-15 SDOH — SOCIAL STABILITY: SOCIAL INSECURITY: ABUSE: ADULT

## 2025-07-15 SDOH — ECONOMIC STABILITY: FOOD INSECURITY: HOW HARD IS IT FOR YOU TO PAY FOR THE VERY BASICS LIKE FOOD, HOUSING, MEDICAL CARE, AND HEATING?: NOT HARD AT ALL

## 2025-07-15 SDOH — ECONOMIC STABILITY: HOUSING INSECURITY: IN THE PAST 12 MONTHS, HOW MANY TIMES HAVE YOU MOVED WHERE YOU WERE LIVING?: 0

## 2025-07-15 SDOH — ECONOMIC STABILITY: HOUSING INSECURITY: IN THE LAST 12 MONTHS, WAS THERE A TIME WHEN YOU WERE NOT ABLE TO PAY THE MORTGAGE OR RENT ON TIME?: NO

## 2025-07-15 SDOH — SOCIAL STABILITY: SOCIAL INSECURITY: HAVE YOU HAD THOUGHTS OF HARMING ANYONE ELSE?: NO

## 2025-07-15 SDOH — SOCIAL STABILITY: SOCIAL INSECURITY: HAVE YOU HAD ANY THOUGHTS OF HARMING ANYONE ELSE?: NO

## 2025-07-15 SDOH — SOCIAL STABILITY: SOCIAL INSECURITY: ARE YOU OR HAVE YOU BEEN THREATENED OR ABUSED PHYSICALLY, EMOTIONALLY, OR SEXUALLY BY ANYONE?: NO

## 2025-07-15 SDOH — ECONOMIC STABILITY: HOUSING INSECURITY: AT ANY TIME IN THE PAST 12 MONTHS, WERE YOU HOMELESS OR LIVING IN A SHELTER (INCLUDING NOW)?: NO

## 2025-07-15 SDOH — HEALTH STABILITY: MENTAL HEALTH: HOW OFTEN DO YOU HAVE A DRINK CONTAINING ALCOHOL?: NEVER

## 2025-07-15 SDOH — HEALTH STABILITY: PHYSICAL HEALTH
HOW OFTEN DO YOU NEED TO HAVE SOMEONE HELP YOU WHEN YOU READ INSTRUCTIONS, PAMPHLETS, OR OTHER WRITTEN MATERIAL FROM YOUR DOCTOR OR PHARMACY?: RARELY

## 2025-07-15 SDOH — SOCIAL STABILITY: SOCIAL INSECURITY
WITHIN THE LAST YEAR, HAVE YOU BEEN RAPED OR FORCED TO HAVE ANY KIND OF SEXUAL ACTIVITY BY YOUR PARTNER OR EX-PARTNER?: NO

## 2025-07-15 SDOH — HEALTH STABILITY: PHYSICAL HEALTH: ON AVERAGE, HOW MANY DAYS PER WEEK DO YOU ENGAGE IN MODERATE TO STRENUOUS EXERCISE (LIKE A BRISK WALK)?: 2 DAYS

## 2025-07-15 SDOH — SOCIAL STABILITY: SOCIAL INSECURITY: WERE YOU ABLE TO COMPLETE ALL THE BEHAVIORAL HEALTH SCREENINGS?: YES

## 2025-07-15 SDOH — ECONOMIC STABILITY: TRANSPORTATION INSECURITY: IN THE PAST 12 MONTHS, HAS LACK OF TRANSPORTATION KEPT YOU FROM MEDICAL APPOINTMENTS OR FROM GETTING MEDICATIONS?: NO

## 2025-07-15 SDOH — SOCIAL STABILITY: SOCIAL INSECURITY: HAS ANYONE EVER THREATENED TO HURT YOUR FAMILY OR YOUR PETS?: NO

## 2025-07-15 SDOH — HEALTH STABILITY: MENTAL HEALTH: CURRENT SMOKER: 0

## 2025-07-15 SDOH — SOCIAL STABILITY: SOCIAL INSECURITY: ARE THERE ANY APPARENT SIGNS OF INJURIES/BEHAVIORS THAT COULD BE RELATED TO ABUSE/NEGLECT?: NO

## 2025-07-15 SDOH — HEALTH STABILITY: MENTAL HEALTH
DO YOU FEEL STRESS - TENSE, RESTLESS, NERVOUS, OR ANXIOUS, OR UNABLE TO SLEEP AT NIGHT BECAUSE YOUR MIND IS TROUBLED ALL THE TIME - THESE DAYS?: NOT AT ALL

## 2025-07-15 SDOH — SOCIAL STABILITY: SOCIAL INSECURITY: DO YOU FEEL UNSAFE GOING BACK TO THE PLACE WHERE YOU ARE LIVING?: NO

## 2025-07-15 SDOH — SOCIAL STABILITY: SOCIAL INSECURITY: DOES ANYONE TRY TO KEEP YOU FROM HAVING/CONTACTING OTHER FRIENDS OR DOING THINGS OUTSIDE YOUR HOME?: NO

## 2025-07-15 SDOH — ECONOMIC STABILITY: HOUSING INSECURITY: DO YOU FEEL UNSAFE GOING BACK TO THE PLACE WHERE YOU LIVE?: NO

## 2025-07-15 SDOH — SOCIAL STABILITY: SOCIAL INSECURITY
ASK PARENT OR GUARDIAN: ARE THERE TIMES WHEN YOU, YOUR CHILD(REN), OR ANY MEMBER OF YOUR HOUSEHOLD FEEL UNSAFE, HARMED, OR THREATENED AROUND PERSONS WITH WHOM YOU KNOW OR LIVE?: NO

## 2025-07-15 ASSESSMENT — COGNITIVE AND FUNCTIONAL STATUS - GENERAL
DRESSING REGULAR UPPER BODY CLOTHING: A LITTLE
TOILETING: A LITTLE
WALKING IN HOSPITAL ROOM: A LITTLE
MOBILITY SCORE: 18
STANDING UP FROM CHAIR USING ARMS: A LITTLE
MOVING TO AND FROM BED TO CHAIR: A LITTLE
MOBILITY SCORE: 17
STANDING UP FROM CHAIR USING ARMS: A LITTLE
HELP NEEDED FOR BATHING: A LITTLE
WALKING IN HOSPITAL ROOM: A LITTLE
DAILY ACTIVITIY SCORE: 21
CLIMB 3 TO 5 STEPS WITH RAILING: A LOT
STANDING UP FROM CHAIR USING ARMS: A LITTLE
DRESSING REGULAR LOWER BODY CLOTHING: A LITTLE
TURNING FROM BACK TO SIDE WHILE IN FLAT BAD: A LITTLE
DRESSING REGULAR LOWER BODY CLOTHING: A LOT
CLIMB 3 TO 5 STEPS WITH RAILING: A LOT
TURNING FROM BACK TO SIDE WHILE IN FLAT BAD: A LITTLE
TURNING FROM BACK TO SIDE WHILE IN FLAT BAD: A LITTLE
MOVING FROM LYING ON BACK TO SITTING ON SIDE OF FLAT BED WITH BEDRAILS: A LITTLE
DAILY ACTIVITIY SCORE: 18
WALKING IN HOSPITAL ROOM: A LOT
PATIENT BASELINE BEDBOUND: NO
MOVING FROM LYING ON BACK TO SITTING ON SIDE OF FLAT BED WITH BEDRAILS: A LITTLE
MOVING TO AND FROM BED TO CHAIR: A LITTLE
PERSONAL GROOMING: A LITTLE
TOILETING: A LITTLE
MOBILITY SCORE: 16
CLIMB 3 TO 5 STEPS WITH RAILING: A LITTLE
HELP NEEDED FOR BATHING: A LITTLE
MOVING FROM LYING ON BACK TO SITTING ON SIDE OF FLAT BED WITH BEDRAILS: A LITTLE
MOVING TO AND FROM BED TO CHAIR: A LITTLE

## 2025-07-15 ASSESSMENT — ACTIVITIES OF DAILY LIVING (ADL)
BATHING: INDEPENDENT
GROOMING: INDEPENDENT
TOILETING: INDEPENDENT
HEARING - RIGHT EAR: FUNCTIONAL
WALKS IN HOME: INDEPENDENT
PATIENT'S MEMORY ADEQUATE TO SAFELY COMPLETE DAILY ACTIVITIES?: YES
LACK_OF_TRANSPORTATION: NO
FEEDING YOURSELF: INDEPENDENT
HEARING - LEFT EAR: FUNCTIONAL
LACK_OF_TRANSPORTATION: NO
ASSISTIVE_DEVICE: CANE;WALKER
JUDGMENT_ADEQUATE_SAFELY_COMPLETE_DAILY_ACTIVITIES: YES
LACK_OF_TRANSPORTATION: NO
ADL_ASSISTANCE: INDEPENDENT
ADLS_ADDRESSED: NO
LACK_OF_TRANSPORTATION: NO
DRESSING YOURSELF: INDEPENDENT
ADEQUATE_TO_COMPLETE_ADL: YES

## 2025-07-15 ASSESSMENT — PAIN DESCRIPTION - LOCATION: LOCATION: KNEE

## 2025-07-15 ASSESSMENT — LIFESTYLE VARIABLES
AUDIT-C TOTAL SCORE: 0
SUBSTANCE_ABUSE_PAST_12_MONTHS: NO
SKIP TO QUESTIONS 9-10: 1
SKIP TO QUESTIONS 9-10: 1
AUDIT-C TOTAL SCORE: 0
PRESCIPTION_ABUSE_PAST_12_MONTHS: NO
HOW MANY STANDARD DRINKS CONTAINING ALCOHOL DO YOU HAVE ON A TYPICAL DAY: PATIENT DOES NOT DRINK
HOW OFTEN DO YOU HAVE 6 OR MORE DRINKS ON ONE OCCASION: NEVER
HOW OFTEN DO YOU HAVE A DRINK CONTAINING ALCOHOL: NEVER
SKIP TO QUESTIONS 9-10: 1
AUDIT-C TOTAL SCORE: 0
SKIP TO QUESTIONS 9-10: 1
AUDIT-C TOTAL SCORE: 0
AUDIT-C TOTAL SCORE: 0

## 2025-07-15 ASSESSMENT — PAIN SCALES - GENERAL
PAINLEVEL_OUTOF10: 0 - NO PAIN
PAINLEVEL_OUTOF10: 1
PAINLEVEL_OUTOF10: 1
PAINLEVEL_OUTOF10: 4
PAINLEVEL_OUTOF10: 0 - NO PAIN
PAINLEVEL_OUTOF10: 1
PAINLEVEL_OUTOF10: 0 - NO PAIN
PAINLEVEL_OUTOF10: 0 - NO PAIN
PAINLEVEL_OUTOF10: 8
PAIN_LEVEL: 0

## 2025-07-15 ASSESSMENT — PAIN - FUNCTIONAL ASSESSMENT
PAIN_FUNCTIONAL_ASSESSMENT: 0-10

## 2025-07-15 ASSESSMENT — COLUMBIA-SUICIDE SEVERITY RATING SCALE - C-SSRS
2. HAVE YOU ACTUALLY HAD ANY THOUGHTS OF KILLING YOURSELF?: NO
1. IN THE PAST MONTH, HAVE YOU WISHED YOU WERE DEAD OR WISHED YOU COULD GO TO SLEEP AND NOT WAKE UP?: NO
6. HAVE YOU EVER DONE ANYTHING, STARTED TO DO ANYTHING, OR PREPARED TO DO ANYTHING TO END YOUR LIFE?: NO

## 2025-07-15 ASSESSMENT — PATIENT HEALTH QUESTIONNAIRE - PHQ9
1. LITTLE INTEREST OR PLEASURE IN DOING THINGS: NOT AT ALL
SUM OF ALL RESPONSES TO PHQ9 QUESTIONS 1 & 2: 0
2. FEELING DOWN, DEPRESSED OR HOPELESS: NOT AT ALL

## 2025-07-15 ASSESSMENT — PAIN DESCRIPTION - ORIENTATION: ORIENTATION: LEFT

## 2025-07-15 ASSESSMENT — PAIN DESCRIPTION - DESCRIPTORS
DESCRIPTORS: SORE
DESCRIPTORS: SORE

## 2025-07-15 NOTE — NURSING NOTE
Pt states she feels itching at her IV site, redness and hives observed around insertion site. Patient denies any trouble breathing or any itching elsewhere. Patient has Vancomycin running. Dr. Sam notified. Vanc rate reduced to 100ml/hr and IV benadryl ordered per Dr. Sam.

## 2025-07-15 NOTE — CARE PLAN
Problem: Balance  Goal: LTG - Patient will maintain standing and sitting balance to allow for completion of daily activities  Outcome: Progressing     Problem: Mobility  Goal: LTG - Patient will ambulate household distance 150 ft with RW at DS level  Outcome: Progressing  Goal: LTG - Patient will navigate 3 steps with rails/device at  level  Outcome: Progressing     Problem: Safety  Goal: LTG - Patient will demonstrate safety requirements appropriate to situation/environment  Outcome: Progressing     Problem: PT Transfers  Goal: LTG - Patient will transfer from one surface to another at DS level   Outcome: Progressing

## 2025-07-15 NOTE — ANESTHESIA PREPROCEDURE EVALUATION
Patient: Emerson Araujo    Procedure Information       Date/Time: 07/15/25 1020    Procedure: Knee Replacement Total Cement Unilat (DePuy Attune Knee, Pineapple [facility])** Overnight ** (Left: Knee) - DePuy Attune Knee, Pineapple Trcay    Location: [facility] / [facility]    Surgeons: Medhat Johnson MD            Relevant Problems   Musculoskeletal   (+) Unilateral primary osteoarthritis, left knee       Clinical information reviewed:   Tobacco  Allergies  Meds   Med Hx  Surg Hx  OB Status  Fam Hx  Soc   Hx        NPO Detail:  NPO/Void Status  Date of Last Liquid: 07/14/25  Time of Last Liquid: 2200  Date of Last Solid: 07/14/25  Time of Last Solid: 2200  Time of Last Void: 0845         Physical Exam    Airway  Mallampati: I  TM distance: >3 FB  Neck ROM: full  Mouth opening: 3 or more finger widths     Cardiovascular Comments: deferred   Dental    Pulmonary Comments: deferred   Abdominal   Comments: deferred           Anesthesia Plan    History of general anesthesia?: yes  History of complications of general anesthesia?: no    ASA 2     spinal, regional and MAC     The patient is not a current smoker.  Education provided regarding risk of obstructive sleep apnea.  intravenous induction   Postoperative pain plan includes opioids.  Anesthetic plan and risks discussed with patient.    Plan discussed with CRNA.

## 2025-07-15 NOTE — ANESTHESIA PROCEDURE NOTES
Spinal Block    Start time: 7/15/2025 10:51 AM  End time: 7/15/2025 10:53 AM  Reason for block: primary anesthetic, procedure for pain, at surgeon's request and post-op pain management  Staffing  Performed: CRNA   Authorized by: Dirk Sam MD    Performed by: ISIS To    Preanesthetic Checklist  Completed: patient identified, IV checked, risks and benefits discussed, surgical consent, pre-op evaluation, timeout performed and sterile techniques followed  Block Timeout  RN/Licensed healthcare professional reads aloud to the Anesthesia provider and entire team: Patient identity, procedure with side and site, patient position, and as applicable the availability of implants/special equipment/special requirements.  Patient on coagulant treatment: no  Timeout performed at: 7/15/2025 10:51 AM  Spinal Block  Patient position: sitting  Prep: Betadine  Sterility prep: drape, gloves, hand hygiene and mask  Sedation level: light sedation  Patient monitoring: blood pressure, ETCO2, continuous pulse oximetry, EKG and heart rate  Approach: midline  Vertebral space: L4-5  Needle  Needle gauge: 24 G  Free flowing CSF: yes    Assessment  Block outcome: patient comfortable  Procedure assessment: patient tolerated procedure well with no immediate complications

## 2025-07-15 NOTE — NURSING NOTE
Patient states she no longer feels itching at the IV insertion site. Redness and hives subsiding slowly with no spreading. JEANETTE De Luna aware.

## 2025-07-15 NOTE — PROGRESS NOTES
Physical Therapy Evaluation & Treatment    Patient Name: Emerson Araujo  MRN: [id number]  Department: [facility]  Room: [address] [place], [place] [address]  Today's Date: 7/15/2025   Time Calculation  Start Time: 1527  Stop Time: 1552  Time Calculation (min): 25 min    Assessment/Plan   PT Assessment  PT Assessment Results: Decreased strength, Decreased range of motion, Decreased endurance, Impaired balance, Decreased mobility, Decreased coordination, Impaired judgement, Decreased safety awareness, Impaired sensation, Obesity, Orthopedic restrictions, Pain  Rehab Prognosis: Good  Barriers to Discharge Home: Physical needs  Physical Needs: Intermittent ADL assistance needed  Evaluation/Treatment Tolerance: Patient tolerated treatment well  Medical Staff Made Aware: Yes  End of Session Communication: Bedside nurse  Assessment Comment: Pt is a 61 y/o female s/p L TKA limited in session due to falling asleep intermittently and decreased L quad activation with buckling in dynamic stance. Pt is anticipated to quickly progress towards goals to allow for safe discharge to [facility] per [facility] program with continued therapy and spouse assistance.  End of Session Patient Position: Bed, 3 rail up, Alarm on   IP OR SWING BED PT PLAN  Inpatient or Swing Bed: Inpatient  PT Plan  Treatment/Interventions: Bed mobility, Transfer training, Stair training, Gait training, Balance training, Neuromuscular re-education, Endurance training, Strengthening, Range of motion, Therapeutic exercise, Therapeutic activity, Home exercise program  PT Plan: Ongoing PT  PT Frequency: BID  PT Discharge Recommendations: Low intensity level of continued care  Equipment Recommended upon Discharge: Wheeled walker, Straight cane  PT Recommended Transfer Status: Assist x1      Subjective     PT Visit Info:  PT Received On: 07/15/25  General Visit Information:  General  Reason for Referral: Pt is s/p L TKA by Dr. Johnson, DOS 7/15/25  Past Medical History Relevant to Rehab: OA, obesity, HTN,  HLD, GERD, CHF, COPD, breast CA, hypothyroidiam, RA, lumbar disc disease, scoliosis, h/o mastectomy, narcolepsy  Family/Caregiver Present: Yes  Caregiver Feedback: all queries answered to satisfaction  Prior to Session Communication: Bedside nurse  Patient Position Received: Bed, 3 rail up, Alarm on  Home Living:  Home Living  Type of Home: House  Lives With: Spouse  Home Adaptive Equipment: Walker rolling or standard, Cane  Home Layout: Two level  Home Access: Stairs to enter with rails  Entrance Stairs-Rails: Left  Entrance Stairs-Number of Steps: 3  Bathroom Shower/Tub: Walk-in shower  Bathroom Toilet: Standard  Prior Level of Function:  Prior Function Per Pt/Caregiver Report  Level of [place]: Independent with ADLs and functional transfers, Independent with homemaking with ambulation  ADL Assistance: Independent  Homemaking Assistance: Independent  Ambulatory Assistance: Independent (with SPC)  Prior Function Comments: denies falls in last 6 months  Precautions:  Precautions  LE Weight Bearing Status: Weight Bearing as Tolerated  Medical Precautions: Fall precautions, Other (comment) (narcolepsy)  Post-Surgical Precautions: Left total knee precautions     Date/Time Vitals Session Patient Position Pulse Resp SpO2 BP MAP (mmHg)    07/15/25 1522 --  --  --  --  94 %  --  --               Objective   Pain:  Pain Assessment  Pain Assessment: 0-10  0-10 (Numeric) Pain Score: 8  Pain Type: Surgical pain  Pain Location: Knee  Pain Orientation: Left (with movement, decreased at rest)  Pain Interventions: Cold applied, Repositioned, Ambulation/increased activity  Response to Interventions: Decrease in pain  Cognition:  Cognition  Overall Cognitive Status: Impaired  Arousal/Alertness: Delayed responses to stimuli  Following Commands: Follows one step commands with increased time  Impulsive: Mildly    General Assessments:     Activity Tolerance  Endurance: Tolerates 10 - 20 min exercise with multiple  rests    Sensation  Light Touch: Partial deficits in the LLE (c/o L foot feeling 'like jello')    Static Sitting Balance  Static Sitting-Balance Support: No upper extremity supported, Feet supported  Static Sitting-Level of Assistance: Close supervision  Dynamic Sitting Balance  Dynamic Sitting-Balance Support: Feet supported, No upper extremity supported  Dynamic Sitting-Level of Assistance: Contact guard, Close supervision    Static Standing Balance  Static Standing-Balance Support: Bilateral upper extremity supported  Static Standing-Level of Assistance: Minimum assistance  Dynamic Standing Balance  Dynamic Standing-Balance Support: Bilateral upper extremity supported  Dynamic Standing-Level of Assistance: Minimum assistance  Functional Assessments:  Bed Mobility  Bed Mobility: Yes  Bed Mobility 1  Bed Mobility 1: Supine to sitting, Sitting to supine, Scooting  Level of Assistance 1: Close supervision, Minimal verbal cues  Bed Mobility Comments 1: Increased time and cues for sequencing, PT assist for line management    Transfers  Transfer: Yes  Transfer 1  Transfer From 1: Bed to  Transfer to 1: Stand  Technique 1: Sit to stand, Stand to sit  Transfer Device 1: Walker  Transfer Level of Assistance 1: Minimum assistance, Minimal verbal cues  Trials/Comments 1: x 1 trial each, increased time and flexed trunk to complete, fair eccentric control with BUE support with stand>sit, cues for sequencing and hand placement    Ambulation/Gait Training  Ambulation/Gait Training Performed: Yes  Ambulation/Gait Training 1  Surface 1: Level tile  Device 1: Rolling walker  Assistance 1: Minimum assistance, Minimal verbal cues  Quality of Gait 1: Soft knee(s), Decreased step length, Knee(s) buckle (L knee buckling x 1 instance in stance)  Comments/Distance (ft) 1: Pt taking 1 step forward with buckling noted, 1 step posterior and 2 steps to right to allow for positioning to return to bed, increased time and cues for sequencing  steps with RW  Extremity/Trunk Assessments:  RUE   RUE : Within Functional Limits  LUE   LUE: Within Functional Limits  RLE   RLE : Within Functional Limits  LLE   LLE : Exceptions to WFL  AROM LLE (degrees)  L Knee Flexion 0-130: 60  L Knee Extension 0-130: 0  Tone LLE  LLE Tone: Hypotonic  Tone Assessment: due to block, buckling noted and difficulty with quad set  Treatments:  Therapeutic Exercise  Therapeutic Exercise Performed: Yes  Therapeutic Exercise Activity 1: x 10 each: bilateral ankle pumps, glute sets, L quad set, L hip ABD, L SLR AAROM; x 5 reps L heel slides, L SAQ    Therapeutic Activity  Therapeutic Activity Performed: Yes  Therapeutic Activity 1: Pt and spouse educated in postop WBS and precautions, staff assist out of bed    Bed Mobility  Bed Mobility: Yes  Bed Mobility 1  Bed Mobility 1: Supine to sitting, Sitting to supine, Scooting  Level of Assistance 1: Close supervision, Minimal verbal cues  Bed Mobility Comments 1: Increased time and cues for sequencing, PT assist for line management    Ambulation/Gait Training  Ambulation/Gait Training Performed: Yes  Ambulation/Gait Training 1  Surface 1: Level tile  Device 1: Rolling walker  Assistance 1: Minimum assistance, Minimal verbal cues  Quality of Gait 1: Soft knee(s), Decreased step length, Knee(s) buckle (L knee buckling x 1 instance in stance)  Comments/Distance (ft) 1: Pt taking 1 step forward with buckling noted, 1 step posterior and 2 steps to right to allow for positioning to return to bed, increased time and cues for sequencing steps with RW  Transfers  Transfer: Yes  Transfer 1  Transfer From 1: Bed to  Transfer to 1: Stand  Technique 1: Sit to stand, Stand to sit  Transfer Device 1: Walker  Transfer Level of Assistance 1: Minimum assistance, Minimal verbal cues  Trials/Comments 1: x 1 trial each, increased time and flexed trunk to complete, fair eccentric control with BUE support with stand>sit, cues for sequencing and hand  placement  Outcome Measures:  [facility] Basic Mobility  Turning from your back to your side while in a flat bed without using bedrails: A little  Moving from lying on your back to sitting on the side of a flat bed without using bedrails: A little  Moving to and from bed to chair (including a wheelchair): A little  Standing up from a chair using your arms (e.g. wheelchair or bedside chair): A little  To walk in hospital room: A little  Climbing 3-5 steps with railing: A lot  Basic Mobility - Total Score: 17    Encounter Problems       Encounter Problems (Active)       Balance       LTG - Patient will maintain standing and sitting balance to allow for completion of daily activities (Progressing)       Start:  07/15/25    Expected End:  07/16/25               Mobility       LTG - Patient will ambulate household distance 150 ft with RW at DS level (Progressing)       Start:  07/15/25    Expected End:  07/16/25            LTG - Patient will navigate 3 steps with rails/device at CS level (Progressing)       Start:  07/15/25    Expected End:  07/16/25               PT Transfers       LTG - Patient will transfer from one surface to another at DS level  (Progressing)       Start:  07/15/25    Expected End:  07/16/25               Pain          Safety       LTG - Patient will demonstrate safety requirements appropriate to situation/environment (Progressing)       Start:  07/15/25    Expected End:  07/16/25                   Education Documentation  Handouts, taught by Ida Marrero PT at 7/15/2025  4:06 PM.  Learner: Family, Patient  Readiness: Acceptance  Method: Explanation, Demonstration, Handout  Response: Needs Reinforcement  Comment: Pt and spouse educated on WBS, precautions, safety with transfers/mobility, staff assistance out of bed, HEP with rationalre, positioning, plan of care    Precautions, taught by Ida Marrero, PT at 7/15/2025  4:06 PM.  Learner: Family, Patient  Readiness: Acceptance  Method:  Explanation, Demonstration, Handout  Response: Needs Reinforcement  Comment: Pt and spouse educated on WBS, precautions, safety with transfers/mobility, staff assistance out of bed, HEP with rationalre, positioning, plan of care    Home Exercise Program, taught by Ida Marrero PT at 7/15/2025  4:06 PM.  Learner: Family, Patient  Readiness: Acceptance  Method: Explanation, Demonstration, Handout  Response: Needs Reinforcement  Comment: Pt and spouse educated on WBS, precautions, safety with transfers/mobility, staff assistance out of bed, HEP with rationalre, positioning, plan of care    Mobility Training, taught by Ida Marrero PT at 7/15/2025  4:06 PM.  Learner: Family, Patient  Readiness: Acceptance  Method: Explanation, Demonstration, Handout  Response: Needs Reinforcement  Comment: Pt and spouse educated on WBS, precautions, safety with transfers/mobility, staff assistance out of bed, HEP with rationalre, positioning, plan of care    Education Comments  No comments found.

## 2025-07-15 NOTE — HH CARE COORDINATION
Home Care received a Referral for Physical Therapy. We have processed the referral for a Start of Care on 7/17/25.     If you have any questions or concerns, please feel free to contact us at [phone]. Follow the prompts, enter your five digit zip code, and you will be directed to your care team on [facility] 1.

## 2025-07-15 NOTE — CONSULTS
Inpatient consult to Medicine  Consult performed by: Keli Ochoa MD  Consult ordered by: Annamarie Handy PA-C          Reason For Consult  Medical management of hypertension, cardiomyopathy, rheumatoid arthritis, history of breast cancer.    History Of Present Illness  Emerson Araujo is a 60 y.o. female who is postop day 0 status post left TKA secondary to osteoarthritis.  She underwent spinal, regional, and MAC anesthesia.  Postoperatively, she has been very drowsy and is also complaining of dizziness and nausea.  Declining medication for these symptoms.  She participated in physical therapy, but did have leg buckling which was limiting.  Denies any pain.  Vital signs have been stable.     Past Medical History  She has a past medical history of Anemia, Arrhythmia (2022), Arthritis (), Autoimmune disorder (Multi) (?), Breast cancer (2021), Cardiomyopathy (2022), CHF (congestive heart failure), Cholelithiasis, Chronic headaches (?), Chronic pain disorder (?), Colon polyp (), Depression (200?), Dizziness (?), Epistaxis, Esophageal disease (), Fractures (?), GERD (gastroesophageal reflux disease) (), Hyperlipidemia, Hypertension (2022), Hypothyroidism (), Joint pain (?), Lumbar disc disease (?), Motion sickness (), Prematurity ([facility]) (1990, May 1991), Scoliosis (?), Sleep apnea, Substance addiction (Multi) (), and Urinary tract infection.    Surgical History  She has a past surgical history that includes Cholecystectomy (?); Mastectomy (2022); Colonoscopy; Upper gastrointestinal endoscopy;  section, low transverse (1992); and Hysterectomy ().     Social History  She reports that she quit smoking about 18 years ago. Her smoking use included cigarettes. She started smoking about 40 years ago. She has a 44 pack-year smoking history. She has been exposed to tobacco smoke. She quit smokeless tobacco use  about 18 years ago.  Her smokeless tobacco use included chew. She reports that she does not currently use alcohol. She reports that she does not currently use drugs.    Family History  Family History[1]     Allergies  Barium iodide, Iodinated contrast media, Sulfa (sulfonamide antibiotics), Atorvastatin, and Iopamidol    Review of Systems   All other systems reviewed and are negative.       Physical Exam  Constitutional:       General: She is not in acute distress.     Appearance: Normal appearance.   HENT:      Head: Normocephalic and atraumatic.      Nose: Nose normal.      Mouth/Throat:      Mouth: Mucous membranes are moist.      Pharynx: Oropharynx is clear.   Cardiovascular:      Rate and Rhythm: Normal rate and regular rhythm.   Pulmonary:      Effort: Pulmonary effort is normal. No respiratory distress.      Breath sounds: Normal breath sounds.   Abdominal:      General: Bowel sounds are normal.      Palpations: Abdomen is soft.      Tenderness: There is no abdominal tenderness. There is no rebound.   Musculoskeletal:         General: No swelling, deformity or signs of injury.      Cervical back: Normal range of motion and neck supple.   Skin:     General: Skin is warm and dry.   Neurological:      General: No focal deficit present.      Mental Status: She is alert and oriented to person, place, and time. Mental status is at baseline.   Psychiatric:         Attention and Perception: Attention and perception normal.         Mood and Affect: Mood normal.         Behavior: Behavior normal.         Judgment: Judgment normal.          Last Recorded Vitals  /81   Pulse 59   Temp 36.1 °C (97 °F) (Temporal)   Resp 16   Wt 98.1 kg (216 lb 4.3 oz)   SpO2 93%     Relevant Results         Assessment/Plan     Left knee osteoarthritis  Management per primary  PT/OT  Pain control, bowel regimen  Antibiotic and DVT prophylaxis per primary  Hypertension  Continue carvedilol  Cardiomyopathy  Continue  Entresto  History of breast cancer  Continue letrozole  Hyperlipidemia  Continue statin  Hypothyroidism  Continue Synthroid  Rheumatoid arthritis  Continue Rinvoq    Keli Ochoa MD             [1]   Family History  Problem Relation Name Age of Onset    Arthritis Mother Fátima Serrano     Vision loss Mother Fátima Serrano 40 - 49    Hearing loss Mother Fátima Johnsoner 60 - 69    Diabetes Father Roman Pride Erikski     Hypertension Father Roman Pride Erikski     Vision loss Father Roman Pride Erikski 50 - 59    Depression Father Roman Pride Erikski 40 - 49    Hearing loss Father Roman Pride Erikski 50 - 59    Diabetes Maternal Grandmother Valery Sarah Rosado     Hypertension Maternal Grandmother Valery Rosado     Heart disease Maternal Grandmother Valery Sarah Rosado     Vision loss Maternal Grandmother Valery Sarah Rosado 20 - 29    Stroke Maternal Grandfather Melchor Rosado     Alcohol abuse Maternal Grandfather Melchor Rosado     Diabetes Paternal Grandfather Roman Valenciaskmyriam     Alcohol abuse Paternal Grandfather Roman Cramer 20 - 29    Cancer Mother's Sister Bev Juliocesar 40 - 49    Cancer Cousin Florinda Jimenez 40 - 49

## 2025-07-15 NOTE — DISCHARGE SUMMARY
MD Annamarie Mcgrath, MPAS, PAWaleC, ATC  Adult Reconstruction and Joint Replacement Surgery  Phone: [phone]     Fax:[phone]             Discharge Summary    Discharge Diagnosis  Left Total Knee Arthroplasty     Issues Requiring Follow-Up  Home care services to start within 48 hours. Outpatient PT to start 2 weeks  S/P total Joint for Knees only. Hips optional.    Test Results Pending At Discharge  Pending Labs       No current pending labs.          Hospital Course  Patient underwent Left Total Knee Arthroplasty  on 7/15/25 without complications. The patient was then taken to the PACU in stable condition. Patient was then transferred to the or.  Pain was appropriately controlled. Diet was advanced as tolerated. Patient progressed adequately through their recovery during hospital stay including PT/ OT and were recommended for discharge. Patient was then discharged on  to home in stable condition. Patient had uneventful hospital course. Patient was instructed on the use of pain medications as needed for pain. The signs and symptoms of infection were discussed and the patient was given our number to call should they have any questions or concerns following discharge.    Based on my clinical judgment, the patient was provided with a 7-day prescription for opioid medication at 30 MED, indicated for treatment of acute pain in the setting of recent Total Joint Arthroplasty. OARRS report was run and has demonstrated an appropriate time course.  The patient has been provided with counseling pertaining to safe use of opioid medication.    Patient may use operative extremity WBAT with use of walker for assistance with ambulation .  Mepilex dressing to be removed POD # 7 by home care and incision left open to air  OAC for DVT prophylaxis started on POD #1 and to be taken for 30 days    Patient is to follow-up in 6 weeks at scheduled post-op visit.     Face-to Face after surgery progress  note  Pertinent Physical Exam At Time of Discharge  Review of Systems   Constitutional: Negative.  Negative for activity change, chills, fatigue and fever.   HENT: Negative.     Eyes: Negative.    Respiratory: Negative.  Negative for cough, chest tightness, shortness of breath and wheezing.    Cardiovascular: Negative.  Negative for palpitations.   Gastrointestinal:  Negative for abdominal pain, blood in stool, nausea and vomiting.   Endocrine: Negative.  Negative for cold intolerance and polyuria.   Genitourinary: Negative.  Negative for difficulty urinating, dysuria, frequency, hematuria and urgency.   Musculoskeletal:  Positive for gait problem and joint swelling. Negative for arthralgias and back pain.   Skin: Negative.  Negative for color change, pallor, rash and wound.   Allergic/Immunologic: Negative.  Negative for environmental allergies.   Neurological:  Negative for dizziness, weakness and light-headedness.   Hematological: Negative.    Psychiatric/Behavioral:  Negative for agitation, confusion and suicidal ideas. The patient is not nervous/anxious.    All other systems reviewed and are negative    Physical Exam  side: left knee  Vitals and nursing note reviewed. VSS, Afebrile  Constitutional:       Appearance: Normal appearance, awake and alert.  HENT:      Head: Normocephalic and atraumatic.       Pupils: Pupils are equal, round, and reactive to light.   Cardiovascular:      Rate and Rhythm: Normal rate and regular rhythm.   Pulmonary:      Effort: Pulmonary effort is normal.     Abdominal:         Palpations: Abdomen is soft.   Musculoskeletal:   Sensation intact bilaterally, sural/saph/sp/tibal n.  Motor intact flexion/extension/DF/PF/EHL/FHL bilaterally. Palpable symmetric DP/PT pulse bilaterally. Spinal wearing off.    Skin:      Bulky Dressing intact to the surgical extremity. No signs of gross bloody or purulent drainage.     General: Skin is warm and dry.      Capillary Refill: Capillary refill  takes less than 2 seconds.   Neurological:      General: No focal deficit present.      Mental Status: She is alert and oriented to person, place, and time. Mental status is at baseline.   Psychiatric:         Mood and Affect: Mood normal.        Home Medications  Scheduled medications  Current Medications[1]     PRN medications  PRN Medications[2]    Discharge medications     Your medication list         Notice    Cannot display discharge medications because the patient has not yet been admitted.         You have not been prescribed any medications.     Outpatient Follow-Up  Patient to follow-up with /Annamarie Handy PA-C.  Thank you for trusting us with your care. You should be scheduled for a follow-up post-surgical visit in 6 weeks.    Special Instructions      Please read discharge instructions provided by your surgeon before calling with questions as this will delay care.    Medication refills-Oxycodone and Tramadol will be refilled every 7 days per state law. Request refills through [facility] at the institution in which you had surgery or MyChart. All medication requests may take up to 72 hours to refill and refills after Friday 1pm will be refilled on the next business day.      No future appointments.    GREGORY Pickering, TYRA ATC  Orthopedic Physician Assisant  Adult Reconstruction and Total Joint Replacement  General Orthopedics  Department of Orthopaedic Surgery  [place] [address]  POWWOW messaging preferred         [1] No current facility-administered medications for this encounter.    Current Outpatient Medications:     acetaminophen (Tylenol Extra Strength) 500 mg tablet, Take 2 tablets (1,000 mg) by mouth every 6 hours if needed for mild pain (1 - 3)., Disp: 240 tablet, Rfl: 0    apixaban (Eliquis) 2.5 mg tablet, Take 1 tablet (2.5 mg) by mouth 2 times a day for 5 days. After 5 days, resume regular home  dose., Disp: 10 tablet, Rfl: 0    aspirin 81 mg EC tablet, Take 1 tablet (81 mg) by mouth once daily., Disp: , Rfl:     calcium carbonate/vitamin D3 (CALCIUM 500 + D, D3, ORAL), Take by mouth., Disp: , Rfl:     carvedilol (Coreg) 25 mg tablet, Take 1 tablet (25 mg) by mouth 2 times daily (morning and late afternoon)., Disp: , Rfl:     chlorhexidine (Hibiclens) 4 % external liquid, For use on hair and body daily, beginning 4 days before surgery and including the morning of surgery for a total of 5 uses., Disp: 236 mL, Rfl: 0    chlorhexidine (Peridex) 0.12 % solution, Gargle, swish and spit. For use the evening before surgery and the morning of surgery. Discard unused portion., Disp: 118 mL, Rfl: 0    cyanocobalamin (Vitamin B-12) 500 mcg tablet, Take 1 tablet (500 mcg) by mouth once daily., Disp: , Rfl:     docusate sodium (Colace) 100 mg capsule, Take 1 capsule (100 mg) by mouth 2 times a day., Disp: 60 capsule, Rfl: 0    doxycycline (Monodox) 100 mg capsule, Take 1 capsule (100 mg) by mouth every 12 hours., Disp: , Rfl:     Entresto 24-26 mg tablet, Take 1 tablet by mouth 2 times daily (morning and late afternoon)., Disp: , Rfl:     folic acid (Folvite) 1 mg tablet, Take 1 tablet (1 mg) by mouth early in the morning.., Disp: , Rfl:     letrozole (Femara) 2.5 mg tablet, Take 1 tablet (2.5 mg total) by mouth once daily., Disp: , Rfl:     levothyroxine (Synthroid, Levoxyl) 200 mcg tablet, Take 1 tablet (200 mcg) by mouth early in the morning.., Disp: , Rfl:     magnesium 250 mg tablet, Take 1 tablet (250 mg) by mouth once daily., Disp: , Rfl:     meloxicam (Mobic) 15 mg tablet, Take 1 tablet (15 mg) by mouth early in the morning.., Disp: , Rfl:     multivitamin with minerals tablet, Take 1 tablet by mouth once daily., Disp: , Rfl:     mupirocin (Bactroban) 2 % ointment, Apply topically 2 times a day., Disp: , Rfl:     omeprazole (PriLOSEC) 40 mg DR capsule, Take 1 capsule (40 mg) by mouth once daily., Disp: , Rfl:      oxyCODONE (Roxicodone) 5 mg immediate release tablet, Take 1 tablet (5 mg) by mouth every 6 hours if needed for severe pain (7 - 10) for up to 7 days., Disp: 28 tablet, Rfl: 0    pantoprazole (ProtoNix) 40 mg EC tablet, Take 1 tablet (40 mg) by mouth once daily. Do not crush, chew, or split., Disp: 30 tablet, Rfl: 0    polyethylene glycol (Glycolax, Miralax) 17 gram packet, Take 17 g by mouth once daily. Mix 1 cap (17g) into 8 ounces of fluid., Disp: 30 packet, Rfl: 0    polyethylene glycol (Glycolax, Miralax) 17 gram/dose powder, Mix of powder and drink. For use to prevent constipation associated with narcotic pain medication use. Use daily, beginning the day after surgery and continue daily while taking narcotic pain medication. (Patient not taking: Mix of powder and drink. Reported on 7/14/2025), Disp: 510 g, Rfl: 0    Rinvoq 15 mg tablet extended release 24 hr, Take 1 tablet (15 mg) by mouth once daily., Disp: , Rfl:     rosuvastatin (Crestor) 10 mg tablet, Take 1 tablet (10 mg) by mouth early in the morning.., Disp: , Rfl:     traMADol (Ultram) 50 mg tablet, Take 1 tablet (50 mg) by mouth every 6 hours if needed for severe pain (7 - 10) for up to 7 days., Disp: 28 tablet, Rfl: 0    zinc gluconate 50 mg elemental tablet, Take 1 tablet by mouth once daily., Disp: , Rfl:   [2]

## 2025-07-15 NOTE — OP NOTE
Knee Replacement Total Cement Unilat (DePuy Attune Knee, Pineapple [place])** Overnight ** (L) Operative Note     Date: 7/15/2025  OR Location: [facility]    Name: Emerson Araujo : 1965, Age: 60 y.o., MRN: [id number], Sex: female    Diagnosis  Pre-op Diagnosis      * Unilateral primary osteoarthritis, left knee [M17.12] Post-op Diagnosis     * Unilateral primary osteoarthritis, left knee [M17.12]     Procedures  Knee Replacement Total Cement Unilat (DePuy Attune Knee, Pineapple [place])** Overnight **  99651 - MA ARTHRP KNE CONDYLE&PLATU MEDIAL&LAT COMPARTMENTS      Surgeons      * Medhat Johnson - Primary    Resident/Fellow/Other Assistant:  Surgeons and Role:  * No surgeons found with a matching role *    Staff:   Circulator: Johnathon Miranda Person: Yuli Miranda Person: Kirsten  Circulator: Manisha    Anesthesia Staff: Anesthesiologist: Dirk Sam MD  CRNA: SARITA To-CRNA    Procedure Summary  Anesthesia: Regional, Monitor Anesthesia Care, Spinal  ASA: II  Estimated Blood Loss: 25mL  Intra-op Medications:   Administrations occurring from 1020 to 1305 on 07/15/25:   Medication Name Total Dose   ropivacaine-epinephrine-clonidine-ketorolac 2.46-0.005- 0.0008-0.3mg/mL periarticular syringe 50 mL   bupivacaine PF (Marcaine) 0.75 % 1.4 mL   ketorolac (Toradol) injection 30 mg 30 mg   ondansetron (Zofran) 2 mg/mL injection 4 mg   propofol (Diprivan) injection 10 mg/mL 1,000 mg   tranexamic acid (Cyklokapron) injection 2,000 mg   ceFAZolin (Ancef) 2 g in dextrose (iso)  mL 2 g   dexAMETHasone (Decadron) 4 mg/mL IV Syringe 2 mL 4 mg              Anesthesia Record               Intraprocedure I/O Totals          Intake    Tranexamic Acid 0.00 mL    The total shown is the total volume documented since Anesthesia Start was filed.    Total Intake 0 mL          Specimen: No specimens collected       Drains and/or Catheters:   Closed/Suction Drain 1 Left;Lateral Knee Accordion 15 Fr. (Active)        Tourniquet Times:   * Missing tourniquet times found for documented tourniquets in lo *     Implants:  Implants       Type Name Action Serial No.      Joint Knee BONE CEMENT, SMART SET, HIGH VISCOSITY, 40GM - [id number] Implanted      Joint Knee BONE CEMENT, SMART SET, HIGH VISCOSITY, 40GM - [id number] Implanted      Joint Knee TIBAL BASE ATTUNE FB, SZ 5 WILBERT - [id number] Implanted      Joint Knee FEMORAL, ATTUNE PS, WILBERT, NRW, SZ 6, LT - [id number] Implanted      Joint Knee INSERT, ATTUNE PS FB, SZ 6, 6MM - [id number] Implanted      Joint Knee DOME, PATELLA, MEDIALIZED, 35MM - [id number] Implanted               Findings: advanced OA    Indications: Emersno Araujo is an 60 y.o. female who is having surgery for Unilateral primary osteoarthritis, left knee [M17.12]    [place]     The patient was seen in the preoperative area. The risks, benefits, complications, treatment options, non-operative alternatives, expected recovery and outcomes were discussed with the patient. The possibilities of reaction to medication, pulmonary aspiration, injury to surrounding structures, bleeding, recurrent infection, the need for additional procedures, failure to diagnose a condition, and creating a complication requiring transfusion or operation were discussed with the patient. The patient concurred with the proposed plan, giving informed consent.  The site of surgery was properly noted/marked if necessary per policy. The patient has been actively warmed in preoperative area. Preoperative antibiotics have been ordered and given within 1 hours of incision. Venous thrombosis prophylaxis have been ordered including bilateral sequential compression devices    Procedure Details: L TKA  Evidence of Infection: No   Complications:  None; patient tolerated the procedure well.    Disposition: PACU - hemodynamically stable.  Condition: stable     PREOPERATIVE DIAGNOSIS:  left knee osteoarthritis      POSTOPERATIVE DIAGNOSIS:  left knee osteoarthritis     OPERATION/PROCEDURE:  left total knee arthroplasty     SURGEON:  Medhat Johnson MD     ASSISTANT(S):  HEATHER Belrtan    The patient's surgery was assisted by JEANETTE Beltran, due to lack of availability of a qualified resident to assist with the surgery.  Annamarie Handy was present for the essential parts of the procedure.  She acted as first assistant and assisted with preparing the leg, draping the leg, exposing the knee, retracting and manipulating the leg during surgery, facilitating safe performance of the procedure, and closure of the wound.     ANESTHESIA:  Spinal, adductor canal block    LOCATION:  [facility]     ESTIMATED BLOOD LOSS AND INTRAVENOUS FLUIDS:  Please see Anesthesia record.     COMPONENTS USED:  DePuy [place] knee system  1. 6N femur  2. 5 tibia  3. 35 patella  4. 6mm insert     BRIEF CLINICAL NOTE:  The patient is a 61 yo female with advanced osteoarthritis of  their left knee.  They failed conservative treatment and wished to  proceed with total knee arthroplasty which is indicated at this time.  We discussed the risks, benefits, and alternatives of surgery  including but not limited to infection, damage to vessel or nerve,  bleeding, soft tissue pain, DVT, PE, problems with anesthesia, lack  of range of motion, continued soft tissue pain, need for further  surgery, etc.  Consent was obtained.  They were taken to the operating  room in order to undergo the procedure.    PRE-OP ROM:      OPERATIVE REPORT:  The patient was transferred to the operating room table.  Time-out  was performed confirming patient name, medical record number,  surgical site, and adequate and appropriate imaging.  The patient  received appropriate IV antibiotics as well as tranexamic acid.  Once we were prepped and draped,midline skin incision was performed.  Hemostasis was obtained using electrocautery.  Underlying extensor mechanism was easily identified and  entered using a standard medial parapatellar arthrotomy performedsharply.  The infrapatellar and suprapatellar fat pads were debrided.Initial medial release was performed.  The patella was subluxed laterally.  Distal femur was entered using a step drill.  Distal  femoral cut was performed, and the femur was sized and prepared.  The tibia was subluxed forward using a double-prong PCL retractor.  The proximal tibia was cut in neutral mechanical axis using an  extramedullary tibial guide.  We then used the lamina  to clear out the posterior osteophytes and clear the meniscal remnants. We then sized the tibia and prepared it. We cut the patella freehand using a caliper to restore the native patellar height. We then trialed with multiple polyethylene inserts.  Once I was happy with the position of components and stability of the knee, all trial components were removed.  The  cut bony surfaces were thoroughly irrigated and dried.  The posterior capsule and surrounding soft tissues were injected with  KAYLYN solution.  We then cemented into place the real components.  The knee was held in extension until all cement was hardened.  All extraneous cement was  removed.  We then closed the extensor mechanism over a drain using interrupted #2 Ethibond as well as interrupted 0 Vicryl.  The subcu was closed with interrupted 2-0 Vicryl.  The skin was closed with  running 3-0 Biosyn followed by Dermabond and Steri-Strips.  Dry sterile dressing was placed.  The patient was transferred back to the hospital bed without incident or complication.  She will be  weightbearing as tolerated.  They will be on Eliquis and SCDs for DVT  prophylaxis.     Task Performed by ANNIE First Assist or Physician Assistant:   Annamarie REID, was necessary to assist on this case due to the nature of the case and difficulty. During the case Annamarie Handy served as my assist by preparing the leg, draping the leg, exposing the knee, retracting and  manipulating the leg during surgery, facilitating safe performance of the procedure, and closure of the wound.    Additional Details: WBAT, Eliquis    Attending Attestation: I was present and scrubbed for the key portions of the procedure.

## 2025-07-15 NOTE — ANESTHESIA PROCEDURE NOTES
Peripheral Block    Patient location during procedure: pre-op  Medication administered at: 7/15/2025 10:05 AM  End time: 7/15/2025 10:08 AM  Reason for block: at surgeon's request and post-op pain management  Staffing  Performed: attending and resident   Authorized by: Jameson Bland MD    Performed by: Roge Hernandez DO  Preanesthetic Checklist  Completed: patient identified, IV checked, site marked, risks and benefits discussed, surgical consent, monitors and equipment checked, pre-op evaluation and timeout performed   Timeout performed at: 7/15/2025 10:02 AM  Peripheral Block  Patient position: laying flat  Prep: ChloraPrep  Patient monitoring: heart rate, continuous pulse ox and cardiac monitor  Block type: adductor canal  Laterality: left  Injection technique: single-shot  Guidance: ultrasound guided  Infiltration strength: 1 %  Dose: 3 mL  Needle  Needle type: short-bevel   Needle gauge: 22 G  Needle length: 10 cm  Needle localization: ultrasound guidance     image stored in chart  Assessment  Injection assessment: negative aspiration for heme, incremental injection, local visualized surrounding nerve on ultrasound and no paresthesia on injection  Heart rate change: no  Slow fractionated injection: yes  Additional Notes  Adductor canal block:    Prior to procedure: Following a focused history, procedure-related and patient-specific complications were discussed. Risks, benefits, and alternatives were explained. Informed, written consent was provided by the patient and/or surrogate decision maker for the block. Anticoagulation (if any) was held per MAUREEN guidelines. ASA monitors were applied. Patient was positioned, prepped with chlorhexidine.    Ultrasound guidance was used to visualize the saphenous nerve and surrounding structures at the adductor canal. Skin was numbed with 1% lidocaine. Needle was inserted and advanced towards target with visualization of the needle throughout duration of the procedure. A  total of 20 cc of 0.5% ropivacaine, dexamethasone 8mg, was divided and injected unilaterally. Patient tolerated procedure well.    Timeout by Dr. Bland, myself and Pre-op RN  Medications Administered  dexAMETHasone (Decadron) injection - perineural injection   5 mg - 7/15/2025 10:08:00 AM  ropivacaine (NAROPIN) 5 MG/ML Perineural - perineural injection   20 mL - 7/15/2025 10:08:00 AM

## 2025-07-15 NOTE — ANESTHESIA POSTPROCEDURE EVALUATION
Patient: Emerson Araujo    Procedure Summary       Date: 07/15/25 Room / Location: [facility] / [facility]    Anesthesia Start: 1042 Anesthesia Stop: 1245    Procedure: Knee Replacement Total Cement Unilat (DePuy Attune Knee, Pineapple [place])** Overnight ** (Left: Knee) Diagnosis:       Unilateral primary osteoarthritis, left knee      (Unilateral primary osteoarthritis, left knee [M17.12])      ()      ([facility])    Surgeons: Medhat Johnson MD Responsible Provider: Dirk Sam MD    Anesthesia Type: spinal, regional, MAC ASA Status: 2            Anesthesia Type: spinal, regional, MAC    Vitals Value Taken Time   /90 07/15/25 12:45   Temp 97 07/15/25 12:45   Pulse 65 07/15/25 12:45   Resp 14 07/15/25 12:45   SpO2 100 07/15/25 12:45       Anesthesia Post Evaluation    Patient location during evaluation: PACU  Patient participation: complete - patient participated  Level of consciousness: awake  Pain score: 0  Pain management: adequate  Airway patency: patent  Cardiovascular status: acceptable  Respiratory status: acceptable  Hydration status: acceptable  Postoperative Nausea and Vomiting: none        There were no known notable events for this encounter.

## 2025-07-15 NOTE — DISCHARGE INSTRUCTIONS
MD Annamarie Mcgrath, MATEOS, PAWaleC, ATC  Adult Reconstruction and Joint Replacement Surgery  Phone: [phone]     Fax:[phone]        DISCHARGE INSTRUCTIONS      PLEASE READ CAREFULLY BEFORE CONTACTING YOUR PROVIDER.    WE WORK COLLABORATIVELY AS A TEAM. CALLING MULTIPLE STAFF MEMBERS REGARDING THE SAME ISSUE WILL DELAY YOUR CARE.    MYCHART IS THE PREFERRED COMMUNICATION FOR ALL TEAM MEMBERS.    POSTOPERATIVE INSTRUCTIONS: TOTAL HIP & TOTAL KNEE ARTHROPLASTY    JOINT CARE TEAM  Please use the information below to contact your care team following surgery.  If you are leaving a message, please include your full name, date of birth and date of surgery so that we can correctly identify you.  Your call will be returned within 1-2 business days, please do not leave multiple messages with other staff regarding a single issue while you are awaiting a return call.     Who to call Contact Information Matters needing handled   Jesenia Hendricks RN, BSN,ONC   [facility] Patient Navigator  Total Joint Replacement    Annamarie Orozco RN, BSN, ONC  [facility] Patient Navigator  Total Joint Replacement [phone] fax      [phone] Clinical questions  Medication refill ([place] residents only)    DME  Renetta Burr   [phone] DME Concerns          -You will NOT receive a call indicating that your prescription has been filled.  Please contact your pharmacy with any questions.    Medication refills will be filled Monday-Friday 7am to 1pm ONLY. [place] residents may request medication refill through the office or your Joint Navigator. Non-[place] residents should follow-up with their provider in the state in which they live for pain medication refill. Any requests received outside of this timeframe will be handled on the next business day.  Please do not call multiple times or call other members of the care team for medication needs, this will cause the refill to take longer.    Per  State and Institutional policy, pain medications can only be refilled every 7 days for up to six weeks following surgery.      My Chart Portal: If you are using the My Chart portal and are requesting a medication refill, please list what type of surgery you had and left or right side, medication that needs refilled, and pharmacy you would like your medication sent.     WEIGHT BEARING As tolerated    ACTIVITY-As Tolerated    DRIVING & TRAVEL AFTER SURGERY   Patients should anticipate waiting at least 4-6 weeks before traveling long distances after surgery.  You will need to stop to walk around ever 1 hour during your travel to help with blood clot prevention.    Patients may not drive until cleared by the joint nurse or the office and you are off of all narcotics.    DENTAL PROCEDURES & CLEANINGS  You must wait a minimum of 3 months for elective dental appointments after a total joint replacement, including routine cleanings or dental work including bridges, crowns, extractions, etc.. Unless, it is an emergency. You will need a prophylactic antibiotic lifelong prior to any dental visit, cleaning or procedure. Your surgeons office or your dentist may provide a prescription antibiotic. Antibiotics are a lifelong need before dental appointments.      You do not need antibiotics for endoscopic procedures such as colononoscopy or EGD, dematologic biopsies or eye surgeries.    WOUND CARE  If you experience continued drainage or bleeding, you may cover with abdominal/Maxi pads (purchase at local drug store).  Knee replacements should wrap with an ace wrap.  You may shower with waterproof dressing on. Your surgical bandage will be removed by your home therapist 1 week after surgery. If you have staples intact, home care will remove in 2 weeks. If you have sutures intact, you will need to return to the office in 2 weeks for suture removal. Once the dressing is removed by home care, you may continue to shower. Let soap and  water wash over the wound. DO NOT SCRUB.  Steri-strips under the bandage will remain in place until they fall off on their own.  If they are loose, you may gently remove.  If they have not fallen off in two weeks, gently peel them off. Do not remove if pulling causes resistance against the incision.  You will see suture tails sticking out of the ends of the incision.  DO NOT CUT THEM.  They will fall off when the sutures dissolve.  If they are bothersome, cover with a band aid.  Do not soak in a bath tub, hot tub, pool or lake until you are 8 weeks out from surgery.  Do not apply lotions, creams or ointments until you are 6 weeks out from surgery,    PAIN, SWELLING, BRUISING & CLICKING  Pain and swelling are a natural part of your recovery which is considered normal for up to a year after surgery.  Symptoms may be treated with movement, ice, compression stockings, elevating your leg, and by following the pain medication regimen as prescribed.  Bruising is normal for several weeks after surgery. You may also have leg swelling and pain in your shin.  You may ice areas that are tender to help with discomfort.  You are required to wear the provided compression stockings, every day, for 4 weeks following surgery.  Remove the stockings at night and place them back on in the morning.  Pain and swelling may temporarily increase with an increase in activity or exercise.  Use ice after activity.  Audible clicking with movement or exercises is considered normal following joint replacement.  If this persists at 6 months or 1 year, please notify your surgeon.  You may also feel decreased sensation or numbness near the incision site.  This is normal and sensation may or may not return.    PERSONAL HYGIENE  You may shower upon discharging from the hospital.  Soap and water is permitted to run over the surgical dressing, steri-strips and incision.  Do not scrub directly over these items.  DO NOT soak your incision in a bath, hot  tub, pool or pond/lake for a minimum of 8 weeks following your surgery.  DO NOT use lotions, creams, ointments on your wound for a minimum of 6 weeks following your surgery. At that time you may use vitamin E to assist with softening of your incision.      RESTARTING HOME ROUTINE - DIET & MEDICATIONS  Post-operative constipation can result due to a combination of inactivity, anesthesia and pain medication. To help prevent this, you should increase your water and fiber intake. Physical activity such as walking will also help stimulate the bowels.   You may resume your normal diet when you discharge home.  Choose foods that help promote good bowel habits and prevent constipation, such as foods high in fiber.  You may restart your home medications the following day after your surgery UNLESS you have been given alternate instructions.  Follow the instructions given to you on your hospital discharge instructions for more information regarding your home medications.      IN-HOME PHYSICAL THERAPY & OUTPATIENT PHYSICAL THERAPY  In-home physical therapy will start 1-2 days after you get home from the hospital.    The home care agency will call within the first 24-48 hours to set up their first visit.  Please do not call your care team to inquire during this timeframe.  Continue the exercises you were given in the hospital until you have been seen by in-home therapy.  Make sure to provide a phone number with the ability for the home care staff to leave a message if you do not answer your phone.    Outpatient physical therapy following knee replacement surgery should begin 2-3 weeks after surgery.  You will be given physical therapy order prior to discharge from the hospital. You should call to schedule this appointment ASAP if not already scheduled before surgery.  Waiting until you are ready for outpatient physical therapy will cause a delay in your care.  You may choose any outpatient physical therapy location.       EMERGENCIES - WHEN TO CONTACT THE SURGEON'S OFFICE IMMEDIATELY  Fever >101 with chills that has been present for at least 48 hours.   Excessive bleeding from incision that will not slow down. A small amount of drainage is normal and expected.  Once pressure is applied and the area is covered, do not continue to check the area regularly.  This will remove pressure and bleeding will continue.  Leave in place for 4-6 hours.  Signs of infection of incision-excessive drainage that is soaking through your dressing (especially if it is pus-like), redness that is spreading out from the edges of your incision, or increased warmth around the area.  Excruciating pain for which the pain medication, taken as instructed, is not helping.  Severe calf pain.  Go directly to the emergency room or call 911, if you are experiencing chest pain or difficulty breathing.    ICE & COLD THERAPY INSTRUCTIONS    To assist with pain control and post-op swelling, you should be using ice regularly throughout recovery, especially for the first 6 weeks, regardless of the cold therapy method you use.      Always make sure there is a layer of protection between the cold pad and your skin.    If you are using ICE PACKS or GEL PACKS, you will need to alternate 20 minutes on, 20 minutes off twice per hour.    If you are using an ICE MACHINE, please follow the provided ice machine instructions.  These devices differ from ice or ice packs whereas the mechanism circulates water through tubing and a pad to provide longer periods of cold therapy to the desired site.  You can use your cold devices around the clock for optimal comfort.  We recommend using cold therapy after working with therapy or completing exercises on your own.  There is no set schedule in which you must follow while using cold therapy.  Below are a few points to remember when using a cold therapy device:    You do not need to need to use the 20 on, 20 off method.  Detach the pad from  the cooler and ambulate at least once every hour.  You can check your skin under the pad at this time.  You may wear the cold therapy device during periods of sleep including overnight.  If you wake up during the night, you can check the skin at this time.  You do not need to wake up specifically to perform skin checks.  Empty the cooler and pad when device is not in use.  Follow 's instructions for cleaning your cold therapy device.    DISCHARGE MEDICATIONS - Please reference the sample schedule on the reverse side for instructions on how to best schedule medications.    After Hour and Weekend Emergency Answering Service [phone]    PAIN MEDICATION    ___X_ Tramadol / Oxycodone  Tramadol and Oxycodone have been prescribed for post-operative pain control.    These medications will only be refilled ONCE every 7 days for a period of up to 6 weeks following surgery.  After 6 weeks, you will transition to acetaminophen and over -the- counter anti-inflammatories such as Ibuprofen, Advil or Aleve in conjunction with ICE/COLD THERAPY.   Side effects may be constipation and nausea, vomiting, sleepiness, dizziness, lightheadedness, headache, blurred vision, dry mouth sweating, itching (if you have itching, over-the -counter Benadryl can be used as needed).  You may NOT operate a motor vehicle while taking these medications or have been cleared by your care team.     ___X_ Acetaminophen (Tylenol)  Acetaminophen has been prescribed as an adjunct for pain control. Take two 500 mg tablets every 6 hours for 4 weeks. You will not receive a refill on this medication.  Do not exceed 4000mg of acetaminophen within a 24 hour period.  Side effects may include nausea, heartburn, drowsiness, and headache.    ___ Meloxicam (Mobic)-Meloxicam has been prescribed as an adjunct anti-inflammatory to assist in pain control.    Take one 15mg tablet once daily for 4 weeks.  You will not receive refills on this medication.   Side  effects may include nausea.  May not be prescribed if you are on a more potent blood thinner than aspirin or have chronic kidney disease.    BLOOD THINNER    ___X_ Blood Thinner   A blood thinner has been prescribed to prevent blood clots in your leg or lungs. Take as prescribed on the bottle for 4 weeks. You will not receive a refill on this medication.        ANTI NAUSEA    ___X_ Proton Pump Inhibitor (PPI)-Stomach Acid Reduction Medication  If you are already on a PPI, you will continue your regular medication. If you are not, you will be prescribed Pantoprazole to help with nausea and protect your stomach while taking pain medication.  You will not receive a refill on this medication.    STOOL SOFTENERS    ___X_ Colace (Docusate Sodium) & Miralax (polyethylene glycol)  Take both medications to help with constipation while using the Oxycodone and Tramadol for pain control.  You will not receive a refill on this medication.    Continued Constipation  If you continue to be constipated despite daily use of Miralax and colace, you try an over-the-counter Dulcolax Suppository and use per instructions on the package.     SPECIAL INSTRUCTIONS       You will not receive refills on the following medications.   Acetaminophen (Tylenol  Miralax  Colace  Proton Pump Inhibitor (PPI)  Blood Thinner    FOLLOW-UP APPOINTMENT   Your post-surgical appointment will take place approximately 1 week after surgery. If you have any questions or need to make changes to this appointment, please contact Jesenia [phone]:    Follow up appointment: Monday following your surgery for Travel patients.       SAMPLE              The times below are an example of how to organize medications to optimize pain control  Your actual medication schedule may vary based on your last dose taken IN THE HOSPITAL      Time 3:00 am 6:00 am 9:00 am 12:00 pm 3:00 pm 6:00 pm 9:00 pm 12:00 am   Medications Tramadol Tylenol  Oxycodone  Miralax   Blood  Thinner  Colace  Pantoprazole or other PPI  Tramadol   Tylenol  Oxycodone Tramadol Tylenol  Oxycodone  Miralax Blood Thinner  Colace  Tramadol   Tylenol  Oxycodone            You may begin to wean off the pain medication as your pain remains controlled with increased activity.  The schedules provided are meant to serve as an example.  You may wean off based on your pain control.  Please note that pain medications are not filled beyond 6 weeks after surgery.              The times below are an example of how to WEAN OFF medications WHILE CONTINUING TO OPTIMIZE PAIN CONTROL.  Your actual medication schedule may vary based on your last dose taken.  Time 12:00am 4:00am 8:00am 12:00pm 4:00pm 8:00pm   Med Tramadol Oxycodone   Tramadol Oxycodone Tramadol Oxycodone     Time 12:00am 6:00am 12:00pm 6:00pm   Med Tramadol Oxycodone   Tramadol Oxycodone     Time 12:00am 8:00am 4:00pm   Med Tramadol Oxycodone   Tramadol     Time 12:00am 12:00pm   Med Tramadol Tramadol

## 2025-07-15 NOTE — CARE PLAN
60 yr old female admitted extended recovery following left knee replacement with Dr. Johnson.  Plan is back to [facility] Rm #[id number] tomorrow with [facility] PT. SOC pending confirmation for 7/17/25.  Post op follow up on Monday Jul 21, 2025 2:00 PM-[place]  Spouse to transport and assist with care as needed.

## 2025-07-16 VITALS
BODY MASS INDEX: 38.32 KG/M2 | WEIGHT: 216.27 LBS | OXYGEN SATURATION: 94 % | SYSTOLIC BLOOD PRESSURE: 139 MMHG | HEIGHT: 63 IN | TEMPERATURE: 97.7 F | DIASTOLIC BLOOD PRESSURE: 70 MMHG | RESPIRATION RATE: 18 BRPM | HEART RATE: 54 BPM

## 2025-07-16 LAB
ANION GAP SERPL CALC-SCNC: 13 MMOL/L (ref 10–20)
BUN SERPL-MCNC: 21 MG/DL (ref 6–23)
CALCIUM SERPL-MCNC: 9.2 MG/DL (ref 8.6–10.3)
CHLORIDE SERPL-SCNC: 98 MMOL/L (ref 98–107)
CO2 SERPL-SCNC: 28 MMOL/L (ref 21–32)
CREAT SERPL-MCNC: 0.86 MG/DL (ref 0.5–1.05)
EGFRCR SERPLBLD CKD-EPI 2021: 78 ML/MIN/1.73M*2
ERYTHROCYTE [DISTWIDTH] IN BLOOD BY AUTOMATED COUNT: 13.5 % (ref 11.5–14.5)
GLUCOSE SERPL-MCNC: 209 MG/DL (ref 74–99)
HCT VFR BLD AUTO: 31.7 % (ref 36–46)
HGB BLD-MCNC: 10.7 G/DL (ref 12–16)
MCH RBC QN AUTO: 31.8 PG (ref 26–34)
MCHC RBC AUTO-ENTMCNC: 33.8 G/DL (ref 32–36)
MCV RBC AUTO: 94 FL (ref 80–100)
NRBC BLD-RTO: ABNORMAL /100{WBCS}
PLATELET # BLD AUTO: 165 X10*3/UL (ref 150–450)
POTASSIUM SERPL-SCNC: 3.9 MMOL/L (ref 3.5–5.3)
RBC # BLD AUTO: 3.37 X10*6/UL (ref 4–5.2)
SODIUM SERPL-SCNC: 135 MMOL/L (ref 136–145)
WBC # BLD AUTO: 14.3 X10*3/UL (ref 4.4–11.3)

## 2025-07-16 PROCEDURE — 85027 COMPLETE CBC AUTOMATED: CPT | Performed by: PHYSICIAN ASSISTANT

## 2025-07-16 PROCEDURE — 97530 THERAPEUTIC ACTIVITIES: CPT | Mod: GP

## 2025-07-16 PROCEDURE — 2500000001 HC RX 250 WO HCPCS SELF ADMINISTERED DRUGS (ALT 637 FOR MEDICARE OP): Performed by: PHYSICIAN ASSISTANT

## 2025-07-16 PROCEDURE — 2500000004 HC RX 250 GENERAL PHARMACY W/ HCPCS (ALT 636 FOR OP/ED): Performed by: PHYSICIAN ASSISTANT

## 2025-07-16 PROCEDURE — 97116 GAIT TRAINING THERAPY: CPT | Mod: GP

## 2025-07-16 PROCEDURE — 99232 SBSQ HOSP IP/OBS MODERATE 35: CPT | Performed by: STUDENT IN AN ORGANIZED HEALTH CARE EDUCATION/TRAINING PROGRAM

## 2025-07-16 PROCEDURE — 36415 COLL VENOUS BLD VENIPUNCTURE: CPT | Performed by: PHYSICIAN ASSISTANT

## 2025-07-16 PROCEDURE — 80048 BASIC METABOLIC PNL TOTAL CA: CPT | Performed by: PHYSICIAN ASSISTANT

## 2025-07-16 PROCEDURE — 2500000002 HC RX 250 W HCPCS SELF ADMINISTERED DRUGS (ALT 637 FOR MEDICARE OP, ALT 636 FOR OP/ED): Performed by: PHYSICIAN ASSISTANT

## 2025-07-16 PROCEDURE — 7100000011 HC EXTENDED STAY RECOVERY HOURLY - NURSING UNIT

## 2025-07-16 RX ADMIN — DOCUSATE SODIUM 100 MG: 100 CAPSULE, LIQUID FILLED ORAL at 08:27

## 2025-07-16 RX ADMIN — ACETAMINOPHEN 650 MG: 325 TABLET ORAL at 06:39

## 2025-07-16 RX ADMIN — APIXABAN 2.5 MG: 2.5 TABLET, FILM COATED ORAL at 08:27

## 2025-07-16 RX ADMIN — LEVOTHYROXINE SODIUM 200 MCG: 0.1 TABLET ORAL at 06:39

## 2025-07-16 RX ADMIN — POLYETHYLENE GLYCOL 3350 17 G: 17 POWDER, FOR SOLUTION ORAL at 08:27

## 2025-07-16 RX ADMIN — CARVEDILOL 25 MG: 12.5 TABLET, FILM COATED ORAL at 08:27

## 2025-07-16 RX ADMIN — CEFAZOLIN SODIUM 2 G: 2 INJECTION, SOLUTION INTRAVENOUS at 01:47

## 2025-07-16 RX ADMIN — PANTOPRAZOLE SODIUM 40 MG: 40 TABLET, DELAYED RELEASE ORAL at 06:39

## 2025-07-16 RX ADMIN — KETOROLAC TROMETHAMINE 15 MG: 15 INJECTION, SOLUTION INTRAMUSCULAR; INTRAVENOUS at 06:39

## 2025-07-16 ASSESSMENT — PAIN - FUNCTIONAL ASSESSMENT
PAIN_FUNCTIONAL_ASSESSMENT: 0-10

## 2025-07-16 ASSESSMENT — COGNITIVE AND FUNCTIONAL STATUS - GENERAL
CLIMB 3 TO 5 STEPS WITH RAILING: A LITTLE
MOVING TO AND FROM BED TO CHAIR: A LITTLE
TOILETING: A LITTLE
MOBILITY SCORE: 20
DRESSING REGULAR LOWER BODY CLOTHING: A LITTLE
CLIMB 3 TO 5 STEPS WITH RAILING: A LITTLE
PERSONAL GROOMING: A LITTLE
MOBILITY SCORE: 22
WALKING IN HOSPITAL ROOM: A LITTLE
WALKING IN HOSPITAL ROOM: A LITTLE
STANDING UP FROM CHAIR USING ARMS: A LITTLE
DAILY ACTIVITIY SCORE: 21

## 2025-07-16 ASSESSMENT — PAIN SCALES - GENERAL
PAINLEVEL_OUTOF10: 5 - MODERATE PAIN
PAINLEVEL_OUTOF10: 2
PAINLEVEL_OUTOF10: 3
PAINLEVEL_OUTOF10: 5 - MODERATE PAIN

## 2025-07-16 ASSESSMENT — PAIN DESCRIPTION - DESCRIPTORS
DESCRIPTORS: ACHING
DESCRIPTORS: ACHING

## 2025-07-16 NOTE — NURSING NOTE
"Assumed care of pt. She's A&O x 4, currently awake in bed. She reports her pain rating at a 1/10 to the left knee, describes it as \"sore\". Ice Man is in place for comfort. Silver mepilex to the left knee is dry and intact, ACE bandage is in place. Hemovac is to gravity drainage w/ a scant amount of serosanguineous output noted in the canister. Assessment as charted, VSS. Incentive spirometry encouraged. Any questions/concerns were addressed. Pt. denies further needs at this this time. Call light is within reach, bed alarm activated.   "

## 2025-07-16 NOTE — PROGRESS NOTES
On monitor SpO2 85%, knocked on door, patient awake, explained oxygen levels at 85% on room air and the need to place on oxygen, patient needing to use bathroom, therefore will place patient on oxygen when done at 2L/m nasal cannula.     Calbe Orta, RRT

## 2025-07-16 NOTE — CARE PLAN
Problem: Pain  Goal: LTG - Patient will demonstrate intervention for managing pain  Outcome: Progressing     Problem: Pain - Adult  Goal: Verbalizes/displays adequate comfort level or baseline comfort level  Outcome: Progressing     Problem: Safety - Adult  Goal: Free from fall injury  Outcome: Progressing     Problem: Discharge Planning  Goal: Discharge to home or other facility with appropriate resources  Outcome: Progressing     Problem: Chronic Conditions and Co-morbidities  Goal: Patient's chronic conditions and co-morbidity symptoms are monitored and maintained or improved  Outcome: Progressing     Problem: Nutrition  Goal: Nutrient intake appropriate for maintaining nutritional needs  Outcome: Progressing     Problem: Deep Vein Thrombosis  Goal: I will remain free from complications of deep vein thrombosis and maintain current level of mobility  Outcome: Progressing     Problem: Fall/Injury  Goal: Not fall by end of shift  Outcome: Progressing  Goal: Be free from injury by end of the shift  Outcome: Progressing  Goal: Verbalize understanding of personal risk factors for fall in the hospital  Outcome: Progressing  Goal: Verbalize understanding of risk factor reduction measures to prevent injury from fall in the home  Outcome: Progressing  Goal: Use assistive devices by end of the shift  Outcome: Progressing  Goal: Pace activities to prevent fatigue by end of the shift  Outcome: Progressing     Problem: Pain  Goal: Takes deep breaths with improved pain control throughout the shift  Outcome: Progressing  Goal: Turns in bed with improved pain control throughout the shift  Outcome: Progressing  Goal: Walks with improved pain control throughout the shift  Outcome: Progressing  Goal: Performs ADL's with improved pain control throughout shift  Outcome: Progressing  Goal: Participates in PT with improved pain control throughout the shift  Outcome: Progressing  Goal: Free from opioid side effects throughout the  shift  Outcome: Progressing  Goal: Free from acute confusion related to pain meds throughout the shift  Outcome: Progressing   The patient's goals for the shift include pain control    The clinical goals for the shift include pain control, safety

## 2025-07-16 NOTE — CARE PLAN
TCC met with patient at the bedside during IDR to discuss care/discharge plan.  Pt POD#1 left knee replacement.  Plan is home today with [facility] PT.  SOC pending confirmation for 7/17/25.  Post op follow up on Monday Jul 21, 2025 2:00 PM-[place].  Spouse to transport home and assist with care as needed.

## 2025-07-16 NOTE — PROGRESS NOTES
GREGORY Pickering, PAWaleC, ATC  Orthopedic Physician Assisant  Adult Reconstruction and Total Joint Replacement  General Orthopedics  Department of Orthopaedic Surgery  [place] [id number]      MARIANO CorderoC      
08-Apr-2024 11:22

## 2025-07-16 NOTE — NURSING NOTE
Assumed care of patient.   Pt voided 100ml this  morning.  Pt ambulated back to bed from bathroom and tolerated activity fairly well. Call light within reach.

## 2025-07-16 NOTE — CARE PLAN
The patient's goals for the shift include pain control    The clinical goals for the shift include Pain control and safe ambulation.

## 2025-07-16 NOTE — PROGRESS NOTES
Emerson Araujo is a 60 y.o. female on day 0 of admission presenting with Unilateral primary osteoarthritis, left knee.      Subjective   Patient seen for follow-up of L TKA on POD 1. No acute events overnight. Pain is well-controlled. No new complaints.       Objective     Last Recorded Vitals  /70   Pulse 54   Temp 36.5 °C (97.7 °F)   Resp 18   Wt 98.1 kg (216 lb 4.3 oz)   SpO2 94%   Intake/Output last 3 Shifts:    Intake/Output Summary (Last 24 hours) at 7/16/2025 0941  Last data filed at 7/16/2025 0640  Gross per 24 hour   Intake 400.83 ml   Output 2290 ml   Net -1889.17 ml       Admission Weight  Weight: 98.1 kg (216 lb 4.3 oz) (07/15/25 0900)    Daily Weight  07/15/25 : 98.1 kg (216 lb 4.3 oz)    Image Results  XR lower extremity leg lengevaluation  Narrative: Interpreted By:  Eugene Urbina,   STUDY:  XR LOWER EXTREMITY LEG LENGTH EVALUATION; ;  7/14/2025 11:44 am      INDICATION:  Signs/Symptoms:PRE-OP XRAYS TO BE DONE DURING PAT AT [facility] OR [facility] [place].      ,M17.12 Unilateral primary osteoarthritis, left knee      COMPARISON:  None.      ACCESSION NUMBER(S):  [id number]      ORDERING CLINICIAN:  MELANY DIXON      FINDINGS:  Standing AP views of bilateral lower extremities. There is no leg  length discrepancy. There is bilateral genu varum deformity.      Impression: No leg length discrepancy. Mild genu varum          MACRO:  None      Signed by: Eugene Urbina 7/14/2025 6:08 PM  Dictation workstation:   [id number]  XR knee left 3 views  Narrative: Interpreted By:  Eugene Urbina,   STUDY:  XR KNEE LEFT 3 VIEWS; ;  7/14/2025 11:44 am      INDICATION:  Signs/Symptoms:PRE-OP XRAYS TO BE DONE DURING PAT AT [facility] OR [facility] [place].      ,M17.12 Unilateral primary osteoarthritis, left knee      COMPARISON:  None.      ACCESSION NUMBER(S):  [id number]      ORDERING CLINICIAN:  MELANY DIXON      FINDINGS:  There is severe medial compartment  joint space narrowing with  osteophytosis and subchondral cyst formation with moderate severe  lateral and patellofemoral compartment degenerative change. There is  no fracture. There is no effusion.      Impression: Severe left knee osteoarthritis predominantly in the medial  compartment          MACRO:  None      Signed by: Eugene Urbina 7/14/2025 6:05 PM  Dictation workstation:   [id number]      Physical Exam  Constitutional:       General: She is not in acute distress.     Appearance: Normal appearance.   HENT:      Head: Normocephalic and atraumatic.      Nose: Nose normal.      Mouth/Throat:      Mouth: Mucous membranes are moist.      Pharynx: Oropharynx is clear.     Cardiovascular:      Rate and Rhythm: Normal rate and regular rhythm.   Pulmonary:      Effort: Pulmonary effort is normal. No respiratory distress.      Breath sounds: Normal breath sounds.   Abdominal:      General: Bowel sounds are normal.      Palpations: Abdomen is soft.      Tenderness: There is no abdominal tenderness. There is no rebound.     Musculoskeletal:         General: No swelling, deformity or signs of injury.      Cervical back: Normal range of motion and neck supple.     Skin:     General: Skin is warm and dry.     Neurological:      General: No focal deficit present.      Mental Status: She is alert and oriented to person, place, and time. Mental status is at baseline.     Psychiatric:         Attention and Perception: Attention and perception normal.         Mood and Affect: Mood normal.         Behavior: Behavior normal.         Judgment: Judgment normal.         Relevant Results                    Results for orders placed or performed during the hospital encounter of 07/15/25 (from the past 24 hours)   CBC   Result Value Ref Range    WBC 14.3 (H) 4.4 - 11.3 x10*3/uL    nRBC      RBC 3.37 (L) 4.00 - 5.20 x10*6/uL    Hemoglobin 10.7 (L) 12.0 - 16.0 g/dL    Hematocrit 31.7 (L) 36.0 - 46.0 %    MCV 94 80 - 100 fL    MCH  31.8 26.0 - 34.0 pg    MCHC 33.8 32.0 - 36.0 g/dL    RDW 13.5 11.5 - 14.5 %    Platelets 165 150 - 450 x10*3/uL   Basic metabolic panel   Result Value Ref Range    Glucose 209 (H) 74 - 99 mg/dL    Sodium 135 (L) 136 - 145 mmol/L    Potassium 3.9 3.5 - 5.3 mmol/L    Chloride 98 98 - 107 mmol/L    Bicarbonate 28 21 - 32 mmol/L    Anion Gap 13 10 - 20 mmol/L    Urea Nitrogen 21 6 - 23 mg/dL    Creatinine 0.86 0.50 - 1.05 mg/dL    eGFR 78 >60 mL/min/1.73m*2    Calcium 9.2 8.6 - 10.3 mg/dL               Assessment & Plan  Unilateral primary osteoarthritis, left knee    Left knee osteoarthritis  Management per primary  PT/OT  Pain control, bowel regimen  Antibiotic and DVT prophylaxis per primary  Stable for discharge  Hypertension  Continue carvedilol  Cardiomyopathy  Continue Entresto  History of breast cancer  Continue letrozole  Hyperlipidemia  Continue statin  Hypothyroidism  Continue Synthroid  Rheumatoid arthritis  Continue Rinvoq           Keli Ochoa MD

## 2025-07-16 NOTE — NURSING NOTE
Pt arrived to the unit alert, awake and needing to void. She was able to get up to the bedside commode with assistance. She denied having any pain, dizziness or lightheadedness. PCA at bedside orienting pt to the unit.

## 2025-07-16 NOTE — NURSING NOTE
Discussed total knee arthroplasty post-operative instructions with patient and caregiver. Reinforced the importance of physical therapy, elevation, pain medication and the use of cold therapy. Discussed waterproof dressing and advised patient to call our office in the event of increased drainage. Advised on DVT prophylaxis and also how to prevent constipation. Explained when to expect the first home care visit and advised to utilize home care PT/RN for general questions. Advised to call nurse navigator during business hours or the surgeon's office with any urgent matters. Advised to call 911 with any SOB or chest pain or emergent issues.  All questions answered at this time.

## 2025-07-16 NOTE — PROGRESS NOTES
Physical Therapy Treatment    Patient Name: Emerson Araujo  MRN: [id number]  Department: [facility]  Room: [address] [place] [address]  Today's Date: 7/16/2025  Time Calculation  Start Time: 0940  Stop Time: 1003  Time Calculation (min): 23 min         Assessment/Plan   PT Assessment  PT Assessment Results: Decreased strength, Decreased range of motion, Decreased endurance, Impaired balance, Decreased mobility, Decreased coordination, Impaired judgement, Decreased safety awareness, Impaired sensation, Obesity, Orthopedic restrictions, Pain  Rehab Prognosis: Good  Barriers to Discharge Home: Physical needs  Physical Needs: Intermittent ADL assistance needed  Evaluation/Treatment Tolerance: Patient tolerated treatment well  End of Session Communication: Bedside nurse  Assessment Comment: Pt requires primarily SBA/CGA w/RW for out of bed mobility. No knee buckling or overt LOB noted throughout session. PT educated pt on appropriate RW height and adjusted accordingly. PT reviewed all dc instructions including HEP, pain management and overall safety w/functional mobility. Pt plans to d/c to hospitals as part of [facility] program w/continued home PT for further progression.  End of Session Patient Position: Bed, 2 rail up, Alarm on BLE elevated, ice to surgical site, call light in reach, needs met, RN aware.   PT Plan  Inpatient/Swing Bed or Outpatient: Inpatient  PT Plan  Treatment/Interventions: Bed mobility, Transfer training, Gait training, Stair training, Balance training, Strengthening, Endurance training, Range of motion, Therapeutic exercise, Therapeutic activity, Home exercise program  PT Plan: Ongoing PT  PT Frequency: BID  PT Discharge Recommendations: Low intensity level of continued care  Equipment Recommended upon Discharge: Wheeled walker, Straight cane  PT Recommended Transfer Status: Assist x1, Assistive device  PT - OK to Discharge: Yes    PT Visit Info:  PT Received On: 07/16/25  Response to Previous Treatment: Patient with no  complaints from previous session., Other (Comment) (pt did not recall PT session previous day, PT reviewed handout w/HEP, knee precautions, WBAT status)     General Visit Information:   General  Reason for Referral: Pt is s/p L TKA by Dr. Johnson, DOS 7/15/25  Referred By: Dr. Johnson  Past Medical History Relevant to Rehab: OA, obesity, HTN, HLD, GERD, CHF, COPD, breast CA, hypothyroidiam, RA, lumbar disc disease, scoliosis, h/o mastectomy, narcolepsy  Family/Caregiver Present: Yes  Caregiver Feedback: spouse intermittently present, all questions/concerns addressed  Prior to Session Communication: Bedside nurse  Patient Position Received: Bed, 3 rail up, Alarm on  General Comment: pt cleared by RN and agreeable to PT session Drain pulled by nursing staff just prior to PT session. L knee post-op dressing dry and intact.      Subjective   Precautions:  Precautions  LE Weight Bearing Status: Weight Bearing as Tolerated  Medical Precautions: Fall precautions, Other (comment) (narcolepsy)  Post-Surgical Precautions: Left total knee precautions            Objective   Pain:  Pain Assessment  Pain Assessment: 0-10  0-10 (Numeric) Pain Score: 5 - Moderate pain  Pain Type: Surgical pain  Pain Location:  (L thigh)  Pain Descriptors: Aching  Pain Interventions: Ambulation/increased activity, Cold applied, Repositioned, Elevated  Cognition:  Cognition  Overall Cognitive Status: Within Functional Limits  Orientation Level: Oriented X4  Following Commands: Follows multistep commands with repetition  Attention: Within Functional Limits  Memory: Exceptions to WFL  Memory Comments: pt unable to recall PT session from previous day, reviewed handout for homegoing including hep, pain management, precautions  Problem Solving: Within Functional Limits  Numeric Reasoning: Within Functional Limits  Abstract Reasoning: Within Functional Limits  Safety/Judgement: Exceptions to WFL  Insight: Mild  Impulsive: Mildly  Processing Speed:  Within funtional limits  Coordination:  Movements are Fluid and Coordinated: Yes  Postural Control:  Postural Control  Postural Control: Within Functional Limits    Activity Tolerance:  Activity Tolerance  Endurance: Tolerates 10 - 20 min exercise with multiple rests  Treatments:  Therapeutic Exercise  Therapeutic Exercise Performed: No (verbal review of handout for HEP)         Bed Mobility  Bed Mobility: Yes  Bed Mobility 1  Bed Mobility 1: Supine to sitting, Sitting to supine, Scooting  Level of Assistance 1: Distant supervision  Bed Mobility Comments 1: pt able to achieve sitting EOB, cues to take time for positional changes    Ambulation/Gait Training  Ambulation/Gait Training Performed: Yes  Ambulation/Gait Training 1  Surface 1: Level tile  Device 1: Rolling walker  Assistance 1: Contact guard, Close supervision  Quality of Gait 1: Diminished heel strike, Antalgic, Forward flexed posture, Decreased step length  Comments/Distance (ft) 1: 10' to bathroom, 130' 50'; education provided on proper height for AD, adjusted accordingly, initially step to gait w/improper distancing w/AD; improved w/sequencing and trial, progressed to step through gait pattern, no knee buckling noted; one standing rest break initiated dt SOB w/education on ECTs  Transfers  Transfer: Yes  Transfer 1  Transfer From 1: Bed to, Toilet to  Transfer to 1: Toilet, Bed  Technique 1: Sit to stand, Stand to sit  Transfer Device 1: Walker  Transfer Level of Assistance 1: Contact guard, Close supervision  Trials/Comments 1: pt performs 2x, cues for hand placements and to decrease WBOS for improved safety    Stairs  Stairs: No    Other Activity  Other Activity Performed: Yes  Other Activity 1: pt IND w/seated level pericare    Outcome Measures:  [facility] Basic Mobility  Turning from your back to your side while in a flat bed without using bedrails: None  Moving from lying on your back to sitting on the side of a flat bed without using bedrails:  None  Moving to and from bed to chair (including a wheelchair): None  Standing up from a chair using your arms (e.g. wheelchair or bedside chair): None  To walk in hospital room: A little  Climbing 3-5 steps with railing: A little  Basic Mobility - Total Score: 22    Education Documentation  No documentation found.  Education Comments  No comments found.        OP EDUCATION:       Encounter Problems       Encounter Problems (Active)       Balance       LTG - Patient will maintain standing and sitting balance to allow for completion of daily activities (Progressing)       Start:  07/15/25    Expected End:  07/16/25               Mobility       LTG - Patient will ambulate household distance 150 ft with RW at DS level (Progressing)       Start:  07/15/25    Expected End:  07/16/25            LTG - Patient will navigate 3 steps with rails/device at CS level (Progressing)       Start:  07/15/25    Expected End:  07/16/25               PT Transfers       LTG - Patient will transfer from one surface to another at DS level  (Progressing)       Start:  07/15/25    Expected End:  07/16/25               Pain          Safety       LTG - Patient will demonstrate safety requirements appropriate to situation/environment (Progressing)       Start:  07/15/25    Expected End:  07/16/25                   Lorenza Moreland, PT, DPT

## 2025-07-16 NOTE — NURSING NOTE
Patient is being discharged to the [facility]. Reviewed discharge instructions and medication list with patient. She acknowledged understanding. No c/o of pain or discomfort voiced. Patient was escorted to family car via  by nursing staff.

## 2025-07-16 NOTE — PROGRESS NOTES
Patient back in bed and placed on oxygen @2LNC, now SpO2 98%.  Will continue to monitor patient during the night.    Caleb Orta, RRT

## 2025-07-17 ENCOUNTER — HOME CARE VISIT (OUTPATIENT)
Dept: HOME HEALTH SERVICES | Facility: HOME HEALTH | Age: 60
End: 2025-07-17
Payer: COMMERCIAL

## 2025-07-17 PROCEDURE — G0151 HHCP-SERV OF PT,EA 15 MIN: HCPCS

## 2025-07-17 SDOH — HEALTH STABILITY: PHYSICAL HEALTH: PHYSICAL EXERCISE: SEATED

## 2025-07-17 SDOH — HEALTH STABILITY: PHYSICAL HEALTH: PHYSICAL EXERCISE: 10

## 2025-07-17 SDOH — HEALTH STABILITY: PHYSICAL HEALTH: PHYSICAL EXERCISE: SUPINE

## 2025-07-17 SDOH — HEALTH STABILITY: PHYSICAL HEALTH: EXERCISE ACTIVITY: HEEL SLIDES

## 2025-07-17 SDOH — HEALTH STABILITY: PHYSICAL HEALTH: EXERCISE ACTIVITY: SAQ

## 2025-07-17 SDOH — HEALTH STABILITY: PHYSICAL HEALTH: EXERCISE ACTIVITY: QS

## 2025-07-17 SDOH — HEALTH STABILITY: PHYSICAL HEALTH: EXERCISE ACTIVITY: GS

## 2025-07-17 SDOH — HEALTH STABILITY: PHYSICAL HEALTH: EXERCISE ACTIVITY: ANKLE PUMPS

## 2025-07-17 SDOH — HEALTH STABILITY: PHYSICAL HEALTH: EXERCISE ACTIVITY: SLR

## 2025-07-17 SDOH — HEALTH STABILITY: PHYSICAL HEALTH: EXERCISE ACTIVITY: KNEE FLEXION

## 2025-07-17 SDOH — HEALTH STABILITY: PHYSICAL HEALTH: EXERCISE ACTIVITY: LAQ

## 2025-07-17 ASSESSMENT — ENCOUNTER SYMPTOMS
ANGER WITHIN DEFINED LIMITS: 1
HIGHEST PAIN SEVERITY IN PAST 24 HOURS: 10/10
MUSCLE WEAKNESS: 1
PAIN LOCATION - PAIN SEVERITY: 8/10
AGGRESSION WITHIN DEFINED LIMITS: 1
PERSON REPORTING PAIN: PATIENT
LIMITED RANGE OF MOTION: 1
PAIN LOCATION: LEFT KNEE
SLEEP QUALITY: ADEQUATE
LOWEST PAIN SEVERITY IN PAST 24 HOURS: 0/10
PAIN: 1

## 2025-07-17 ASSESSMENT — ACTIVITIES OF DAILY LIVING (ADL)
ENTERING_EXITING_HOME: STAND BY ASSIST
AMBULATION_DISTANCE/DURATION_TOLERATED: 200 FEET
OASIS_M1830: 03

## 2025-07-18 ENCOUNTER — HOME CARE VISIT (OUTPATIENT)
Dept: HOME HEALTH SERVICES | Facility: HOME HEALTH | Age: 60
End: 2025-07-18
Payer: COMMERCIAL

## 2025-07-18 ENCOUNTER — TELEPHONE (OUTPATIENT)
Dept: ORTHOPEDIC SURGERY | Facility: HOSPITAL | Age: 60
End: 2025-07-18
Payer: COMMERCIAL

## 2025-07-18 VITALS — TEMPERATURE: 98.3 F | HEART RATE: 75 BPM | OXYGEN SATURATION: 93 %

## 2025-07-18 VITALS
HEART RATE: 85 BPM | SYSTOLIC BLOOD PRESSURE: 128 MMHG | RESPIRATION RATE: 16 BRPM | OXYGEN SATURATION: 99 % | TEMPERATURE: 97.9 F | DIASTOLIC BLOOD PRESSURE: 76 MMHG

## 2025-07-18 DIAGNOSIS — Z47.1 AFTERCARE FOLLOWING KNEE JOINT REPLACEMENT SURGERY, UNSPECIFIED LATERALITY: ICD-10-CM

## 2025-07-18 DIAGNOSIS — M17.12 UNILATERAL PRIMARY OSTEOARTHRITIS, LEFT KNEE: ICD-10-CM

## 2025-07-18 DIAGNOSIS — Z96.659 AFTERCARE FOLLOWING KNEE JOINT REPLACEMENT SURGERY, UNSPECIFIED LATERALITY: ICD-10-CM

## 2025-07-18 PROCEDURE — G0299 HHS/HOSPICE OF RN EA 15 MIN: HCPCS

## 2025-07-18 PROCEDURE — G0151 HHCP-SERV OF PT,EA 15 MIN: HCPCS

## 2025-07-18 RX ORDER — OXYCODONE HYDROCHLORIDE 5 MG/1
5 TABLET ORAL EVERY 6 HOURS PRN
Qty: 28 TABLET | Refills: 0 | Status: SHIPPED | OUTPATIENT
Start: 2025-07-18 | End: 2025-07-25

## 2025-07-18 RX ORDER — TRAMADOL HYDROCHLORIDE 50 MG/1
50 TABLET, FILM COATED ORAL EVERY 6 HOURS PRN
Qty: 28 TABLET | Refills: 0 | Status: SHIPPED | OUTPATIENT
Start: 2025-07-18 | End: 2025-07-25

## 2025-07-18 SDOH — HEALTH STABILITY: PHYSICAL HEALTH: EXERCISE TYPE: SUPINE/SEATED THEREX

## 2025-07-18 SDOH — HEALTH STABILITY: PHYSICAL HEALTH: EXERCISE COMMENTS: OLLOWING CUES. UPDATED HEP TO INCLUDE SEATED EXERCISES.

## 2025-07-18 SDOH — HEALTH STABILITY: PHYSICAL HEALTH
EXERCISE COMMENTS: INSTRUCTED PT IN TKA EXERCISES INCLUDING: SUPINE QUADS SETS, HEEL SLIDES, SAQ, SLR, SEATED LAQ, HEEL SLIDES X2 SETS 10. VERBAL CUES/DEMONSTRATION PROVIDED FOR APPROPRIATE TECHNIQUE AND GOOD MUSCLE ENGAGEMENT. PATIENT DEMONSTRATES GOOD UNDERSTANDING F

## 2025-07-18 ASSESSMENT — ENCOUNTER SYMPTOMS
PAIN LOCATION - PAIN SEVERITY: 3/10
PAIN LOCATION: LEFT KNEE
SUBJECTIVE PAIN PROGRESSION: WAXING AND WANING
PAIN: 1
PERSON REPORTING PAIN: PATIENT

## 2025-07-18 ASSESSMENT — ACTIVITIES OF DAILY LIVING (ADL)
AMBULATION_DISTANCE/DURATION_TOLERATED: 100 FT
AMBULATION ASSISTANCE ON FLAT SURFACES: 1

## 2025-07-18 NOTE — TELEPHONE ENCOUNTER
Left voicemail for patient to follow up after hospital discharge inquiring how patient is feeling following joint replacement surgery. Advised that she should be wearing Adalid Hose, how to manage pain and swelling, using Miralax to prevent constipation.  Reminded her to call us if home care has not been out.  Reminded of follow up visit on Monday. Advised to call their nurse navigator during business hours, after business hours advised to call  their surgeon's office for urgent matters. Provided contact information.

## 2025-07-20 ASSESSMENT — ENCOUNTER SYMPTOMS
PERSON REPORTING PAIN: PATIENT
APPETITE LEVEL: FAIR
CHANGE IN APPETITE: UNCHANGED
DENIES PAIN: 1

## 2025-07-21 ENCOUNTER — PHARMACY VISIT (OUTPATIENT)
Dept: PHARMACY | Facility: CLINIC | Age: 60
End: 2025-07-21
Payer: COMMERCIAL

## 2025-07-21 ENCOUNTER — TELEPHONE (OUTPATIENT)
Dept: ORTHOPEDIC SURGERY | Facility: HOSPITAL | Age: 60
End: 2025-07-21
Payer: COMMERCIAL

## 2025-07-21 ENCOUNTER — HOME CARE VISIT (OUTPATIENT)
Dept: HOME HEALTH SERVICES | Facility: HOME HEALTH | Age: 60
End: 2025-07-21
Payer: COMMERCIAL

## 2025-07-21 ENCOUNTER — OFFICE VISIT (OUTPATIENT)
Dept: ORTHOPEDIC SURGERY | Facility: HOSPITAL | Age: 60
End: 2025-07-21
Payer: COMMERCIAL

## 2025-07-21 DIAGNOSIS — Z47.1 AFTERCARE FOLLOWING LEFT KNEE JOINT REPLACEMENT SURGERY: Primary | ICD-10-CM

## 2025-07-21 DIAGNOSIS — Z96.652 AFTERCARE FOLLOWING LEFT KNEE JOINT REPLACEMENT SURGERY: Primary | ICD-10-CM

## 2025-07-21 PROCEDURE — 99024 POSTOP FOLLOW-UP VISIT: CPT | Performed by: ORTHOPAEDIC SURGERY

## 2025-07-21 PROCEDURE — G0151 HHCP-SERV OF PT,EA 15 MIN: HCPCS

## 2025-07-21 PROCEDURE — RXMED WILLOW AMBULATORY MEDICATION CHARGE

## 2025-07-21 PROCEDURE — 99214 OFFICE O/P EST MOD 30 MIN: CPT | Performed by: ORTHOPAEDIC SURGERY

## 2025-07-21 ASSESSMENT — ACTIVITIES OF DAILY LIVING (ADL)
AMBULATION_DISTANCE/DURATION_TOLERATED: 250
AMBULATION ASSISTANCE ON FLAT SURFACES: 1
HOME_HEALTH_OASIS: 01
OASIS_M1830: 03

## 2025-07-21 ASSESSMENT — ENCOUNTER SYMPTOMS
PAIN LOCATION - PAIN FREQUENCY: INTERMITTENT
PERSON REPORTING PAIN: PATIENT
PAIN LOCATION: LEFT KNEE
PAIN LOCATION - PAIN SEVERITY: 8/10
PAIN: 1
PAIN LOCATION - EXACERBATING FACTORS: ROM
PAIN LOCATION - RELIEVING FACTORS: PAIN MEDS, ICING, CHANGE IN WBING
PAIN LOCATION - PAIN QUALITY: SHARP

## 2025-07-21 NOTE — PROGRESS NOTES
GREGORY Pickering, PAWaleC, ATC  Orthopedic Physician Assisant  Adult Reconstruction and Total Joint Replacement  General Orthopedics  Department of Orthopaedic Surgery  [place] [phone]      MARIANO CorderoC

## 2025-07-21 NOTE — TELEPHONE ENCOUNTER
Emerson called back and stated that she is doing well. No questions or concerns at this time. I explained that I will be contacting her for her clear to travel appointment this afternoon.

## 2025-07-30 ENCOUNTER — TELEPHONE (OUTPATIENT)
Dept: ORTHOPEDIC SURGERY | Facility: HOSPITAL | Age: 60
End: 2025-07-30
Payer: COMMERCIAL

## 2025-07-30 NOTE — TELEPHONE ENCOUNTER
Spoke with patient on the phone to follow up after travelling [place] [facility]. Patient is doing well following total knee replacement and reports that pain and swelling is decreasing. Advised on who to reach out to for pain medication requests and that pain medication may be needed for up to 4-6 weeks. Reports continuing to wear Adalid Hose and denies issues with constipation. Patient has started outpatient physical therapy and is unsure of her current ROM. Patient was advised to call us today with an update after her PT session later this afternoon. Advised that driving may resume 4-6 weeks after surgery as long as they are off of pain medication. Reminded to get x-rays 6 weeks after surgery and to call [facility] to let us know once complete. Advised to call their nurse navigator during business hours for questions or concerns. For urgent matters or immediate attention, advised to reach out to their PCP or to report to the nearest emergency room. Denies any further questions or concerns at this time.

## 2025-07-31 ENCOUNTER — TELEPHONE (OUTPATIENT)
Dept: ORTHOPEDIC SURGERY | Facility: HOSPITAL | Age: 60
End: 2025-07-31
Payer: COMMERCIAL

## 2025-07-31 NOTE — TELEPHONE ENCOUNTER
Patient left a VM message to report her ROM following her PT visit today. She states that her left knee was -1 degree from straight and that she was able to flex to 90 degrees.

## (undated) DEVICE — TUBING, SUCTION, 1/4" X 20', STRAIGHT: Brand: MEDLINE INDUSTRIES, INC.

## (undated) DEVICE — GLOVE, SURGICAL, PROTEXIS PI , 7.5, PF, LF

## (undated) DEVICE — GLOVE, SURGICAL, PROTEXIS PI , 6.5, PF, LF

## (undated) DEVICE — MAJOR CDS

## (undated) DEVICE — DRAIN, WOUND, ROUND, W/TROCAR, HOLE PATTERN, 10 IN, MEDIUM/LARGE, 3/16 X 49 IN

## (undated) DEVICE — JACKSON-PRATT 100CC BULB RESERVOIR: Brand: CARDINAL HEALTH

## (undated) DEVICE — MARKER,SKIN,WI/RULER AND LABELS: Brand: MEDLINE

## (undated) DEVICE — THE CHANNEL CLEANING BRUSH IS A NYLON FLEXI BRUSH ATTACHED TO A FLEXIBLE PLASTIC SHEATH DESIGNED TO SAFELY REMOVE DEBRIS FROM FLEXIBLE ENDOSCOPES.

## (undated) DEVICE — CHANNEL DRAIN, BARD, 15FR, ROUND HUBLESS, FULL FLUTED

## (undated) DEVICE — VERESS PNEUMOPERITONEUM NEEDLE: Brand: N.A.

## (undated) DEVICE — CUFF,BP,DISP,1 TUBE,ADULT,HP: Brand: MEDLINE

## (undated) DEVICE — GOWN,PRECEPT,XLNG/XXLARGE,STRL: Brand: MEDLINE

## (undated) DEVICE — MASK,OXYGEN,MED CONC,ADLT,7' TUB, UC: Brand: PENDING

## (undated) DEVICE — GLOVE SURG SZ 7 CRM LTX FREE POLYISOPRENE POLYMER BEAD ANTI

## (undated) DEVICE — BLADE, SAW, RECIPROCATING, DOUBLE-SIDED, 70 X 12.5 X 1 MM, STAINLESS STEEL

## (undated) DEVICE — GLOVE, SURGICAL, PROTEXIS PI ORTHO, 8.0, PF, LF

## (undated) DEVICE — RESERVOIR, WOUND, W/TROCAR, PVC, MEDIUM, 400CC, DAVOL, 1/8 IN, 10FR

## (undated) DEVICE — SYRINGE, 60 CC, IRRIGATION, BULB, CONTRO-BULB, PAPER POUCH

## (undated) DEVICE — Device: Brand: DEFENDO AIR/WATER/SUCTION AND BIOPSY VALVE

## (undated) DEVICE — CLIP INT M L GRN TI TRNSVRS GRV CHEVRON SHP W/ PRECIS TIP

## (undated) DEVICE — GLOVE ORTHO 8   MSG9480

## (undated) DEVICE — SUTURE VCRL SZ 3-0 L27IN ABSRB UD L26MM SH 1/2 CIR J416H

## (undated) DEVICE — CONTAINER,SPECIMEN,OR STERILE,4OZ: Brand: MEDLINE

## (undated) DEVICE — NEEDLE, SPINAL, QUINCKE, 18 G X 3.5 IN, PINK HUB

## (undated) DEVICE — TOWEL,OR,DSP,ST,BLUE,DLX,4/PK,20PK/CS: Brand: MEDLINE

## (undated) DEVICE — SUTURE, VICRYL, 0, 18 IN, CT-1, UNDYED

## (undated) DEVICE — ENDOGATOR AUXILIARY WATER JET CONNECTOR: Brand: ENDOGATOR

## (undated) DEVICE — DRAIN JACKSON PRATT ROUND 15FR: Brand: CARDINAL HEALTH

## (undated) DEVICE — Device

## (undated) DEVICE — GOWN,PREVENTION PLUS,XL,ST,24/CS: Brand: MEDLINE

## (undated) DEVICE — THE SINGLE USE ETRAP – POLYP TRAP IS USED FOR SUCTION RETRIEVAL OF ENDOSCOPICALLY REMOVED POLYPS.: Brand: ETRAP

## (undated) DEVICE — DRAPE, ISOLATION, INCISE, W/POUCH, STERI DRAPE, 125 X 83 IN, DISPOSABLE, STERILE

## (undated) DEVICE — LARYNGOSCOPE BLDE MAC HNDL M SZ 35 ST CURAPLEX CURAVIEW LED

## (undated) DEVICE — DRESSING, TRANSPARENT, TEGADERM, 4 X 4.5

## (undated) DEVICE — SUTURE ETHLN SZ 2-0 L30IN NONABSORBABLE BLK L36MM FSLX 3/8 1674H

## (undated) DEVICE — DEV INFL ALLIANCE2 SYS

## (undated) DEVICE — YANKAUER,TAPERED BULBOUS TIP,VENTED: Brand: MEDLINE

## (undated) DEVICE — STRIP, SKIN CLOSURE, STERI STRIP, REINFORCED, 0.5 X 4 IN

## (undated) DEVICE — BLANKET WRM W40.2XL55.9IN IORT LO BODY + MISTRAL AIR

## (undated) DEVICE — GLOVE, SURGICAL, PROTEXIS PI BLUE W/NEUTHERA, 7.0, PF, LF

## (undated) DEVICE — SENSR O2 OXIMAX FNGR A/ 18IN NONSTR

## (undated) DEVICE — SUTURE, VICRYL, 2-0, 18 IN CP-2, UNDYED

## (undated) DEVICE — SUTURE VCRL SZ 2-0 L36IN ABSRB UD L36MM CT-1 1/2 CIR J945H

## (undated) DEVICE — DRAPE, INCISE, STERI DRAPE, LARGE, 23 X 17 IN, DISPOSABLE, STERILE

## (undated) DEVICE — CAUTERY TIP POLISHER: Brand: DEVON

## (undated) DEVICE — SUTURE, MONOCRYL, 3-0, PS-1 27IN, UNDYED

## (undated) DEVICE — TUBE ET 7.5MM NSL ORAL BASIC CUF INTMED MURPHY EYE RADPQ

## (undated) DEVICE — BLADE, SAW, DUAL SAGITTAL, 1.9 X 90 X 30

## (undated) DEVICE — SNAR POLYP SENSATION MICRO OVL 13 240X40

## (undated) DEVICE — SUTURE N ABSRB BRAIDED 2-0 RB1 30 IN COAT WHT ETHBND EXCEL MX523

## (undated) DEVICE — GLOVE, SURGICAL, PROTEXIS PI , 8.0, PF, LF

## (undated) DEVICE — CUFF, TOURNIQUET, 30 X 4, DUAL PORT/SNGL BLADDER, DISP, LF

## (undated) DEVICE — HOOD, SURGICAL, FLYTE HYBRID

## (undated) DEVICE — FRCP BIOP ENDO CAPTURA/PRO SERR SPK 2.8MM 230CM

## (undated) DEVICE — YANKAUER,BULB TIP WITH VENT: Brand: ARGYLE

## (undated) DEVICE — DRAPE, IRRIGATION, W/POUCH, ADHESIVE STRIP, STERI DRAPE, 19 X 23 IN, DISPOSABLE, STERILE

## (undated) DEVICE — TBG SMPL FLTR LINE NASL 02/C02 A/ BX/100

## (undated) DEVICE — SUTURE PERMAHAND SZ 2-0 L18IN NONABSORBABLE BLK L26MM FS 685G

## (undated) DEVICE — ESOPHAGEAL BALLOON DILATATION CATHETER: Brand: CRE FIXED WIRE

## (undated) DEVICE — SYSTEM SKIN CLSR 22CM DERMBND PRINEO

## (undated) DEVICE — SYRINGE, 50 CC, LUER LOCK

## (undated) DEVICE — SHEET,DRAPE,53X77,STERILE: Brand: MEDLINE

## (undated) DEVICE — PENCIL SMK EVAC 10 FT BLADE ELECTRD ROCKER FOR TELSCP

## (undated) DEVICE — AEGIS 1" DISK 4MM HOLE, PEEL OPEN: Brand: MEDLINE

## (undated) DEVICE — SUTURE, ETHIBOND, P2, V-37, 30 IN, GREEN

## (undated) DEVICE — PACK,UNIVERSAL,NO GOWNS: Brand: MEDLINE

## (undated) DEVICE — DRESSING TRNSPAR W2XL2.75IN FLM SHT SEMIPERMEABLE WIND

## (undated) DEVICE — DRAPE, SHEET, U, W/ADHESIVE STRIP, IMPERVIOUS, 60 X 70 IN, DISPOSABLE, LF, STERILE

## (undated) DEVICE — SOLUTION, INJECTION, 0.9% SODIUM CHL, USP LIFECARE 1000 MI

## (undated) DEVICE — CONMED SCOPE SAVER BITE BLOCK, 20X27 MM: Brand: SCOPE SAVER

## (undated) DEVICE — COVER US PRB W5XL96IN LTX W/ GEL

## (undated) DEVICE — MSK O2 MD CONCENTR A/ LF 7FT 1P/U

## (undated) DEVICE — GAUZE,SPONGE,FLUFF,6"X6.75",STRL,10/TRAY: Brand: MEDLINE